# Patient Record
Sex: MALE | Race: WHITE | NOT HISPANIC OR LATINO | Employment: OTHER | URBAN - NONMETROPOLITAN AREA
[De-identification: names, ages, dates, MRNs, and addresses within clinical notes are randomized per-mention and may not be internally consistent; named-entity substitution may affect disease eponyms.]

---

## 2018-08-25 ENCOUNTER — APPOINTMENT (OUTPATIENT)
Dept: CT IMAGING | Facility: HOSPITAL | Age: 68
End: 2018-08-25

## 2018-08-25 ENCOUNTER — HOSPITAL ENCOUNTER (INPATIENT)
Facility: HOSPITAL | Age: 68
LOS: 10 days | Discharge: NURSING, REHAB OR LONG-TERM CARE | End: 2018-09-05
Attending: EMERGENCY MEDICINE | Admitting: INTERNAL MEDICINE

## 2018-08-25 ENCOUNTER — APPOINTMENT (OUTPATIENT)
Dept: GENERAL RADIOLOGY | Facility: HOSPITAL | Age: 68
End: 2018-08-25

## 2018-08-25 DIAGNOSIS — F10.10 ALCOHOL ABUSE: ICD-10-CM

## 2018-08-25 DIAGNOSIS — T07.XXXA ABRASIONS OF MULTIPLE SITES: ICD-10-CM

## 2018-08-25 DIAGNOSIS — R13.12 OROPHARYNGEAL DYSPHAGIA: ICD-10-CM

## 2018-08-25 DIAGNOSIS — E86.0 DEHYDRATION: ICD-10-CM

## 2018-08-25 DIAGNOSIS — M62.82 NON-TRAUMATIC RHABDOMYOLYSIS: ICD-10-CM

## 2018-08-25 DIAGNOSIS — S06.5XAA SUBDURAL HEMATOMA (HCC): Primary | ICD-10-CM

## 2018-08-25 DIAGNOSIS — N17.9 AKI (ACUTE KIDNEY INJURY) (HCC): ICD-10-CM
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
## 2018-08-25 DIAGNOSIS — Z74.09 IMPAIRED MOBILITY AND ADLS: ICD-10-CM

## 2018-08-25 DIAGNOSIS — R53.1 GENERALIZED WEAKNESS: ICD-10-CM

## 2018-08-25 DIAGNOSIS — Z78.9 IMPAIRED MOBILITY AND ADLS: ICD-10-CM

## 2018-08-25 DIAGNOSIS — D64.9 ANEMIA, UNSPECIFIED TYPE: ICD-10-CM

## 2018-08-25 DIAGNOSIS — R77.8 ELEVATED TROPONIN: ICD-10-CM

## 2018-08-25 LAB
HOLD SPECIMEN: NORMAL
HOLD SPECIMEN: NORMAL
TROPONIN I SERPL-MCNC: 0.24 NG/ML (ref 0–0.03)
WHOLE BLOOD HOLD SPECIMEN: NORMAL
WHOLE BLOOD HOLD SPECIMEN: NORMAL

## 2018-08-25 PROCEDURE — 83550 IRON BINDING TEST: CPT | Performed by: INTERNAL MEDICINE

## 2018-08-25 PROCEDURE — 99285 EMERGENCY DEPT VISIT HI MDM: CPT

## 2018-08-25 PROCEDURE — 80048 BASIC METABOLIC PNL TOTAL CA: CPT | Performed by: NEUROLOGICAL SURGERY

## 2018-08-25 PROCEDURE — 85027 COMPLETE CBC AUTOMATED: CPT | Performed by: NEUROLOGICAL SURGERY

## 2018-08-25 PROCEDURE — 84484 ASSAY OF TROPONIN QUANT: CPT | Performed by: EMERGENCY MEDICINE

## 2018-08-25 PROCEDURE — 83540 ASSAY OF IRON: CPT | Performed by: INTERNAL MEDICINE

## 2018-08-25 PROCEDURE — 70450 CT HEAD/BRAIN W/O DYE: CPT

## 2018-08-25 PROCEDURE — 93010 ELECTROCARDIOGRAM REPORT: CPT | Performed by: INTERNAL MEDICINE

## 2018-08-25 PROCEDURE — 80307 DRUG TEST PRSMV CHEM ANLYZR: CPT | Performed by: EMERGENCY MEDICINE

## 2018-08-25 PROCEDURE — 90471 IMMUNIZATION ADMIN: CPT | Performed by: EMERGENCY MEDICINE

## 2018-08-25 PROCEDURE — 82728 ASSAY OF FERRITIN: CPT | Performed by: INTERNAL MEDICINE

## 2018-08-25 PROCEDURE — 25010000002 TDAP 5-2.5-18.5 LF-MCG/0.5 SUSPENSION: Performed by: EMERGENCY MEDICINE

## 2018-08-25 PROCEDURE — 82746 ASSAY OF FOLIC ACID SERUM: CPT | Performed by: INTERNAL MEDICINE

## 2018-08-25 PROCEDURE — 93005 ELECTROCARDIOGRAM TRACING: CPT | Performed by: EMERGENCY MEDICINE

## 2018-08-25 PROCEDURE — 85610 PROTHROMBIN TIME: CPT | Performed by: NEUROLOGICAL SURGERY

## 2018-08-25 PROCEDURE — 90715 TDAP VACCINE 7 YRS/> IM: CPT | Performed by: EMERGENCY MEDICINE

## 2018-08-25 PROCEDURE — 72170 X-RAY EXAM OF PELVIS: CPT

## 2018-08-25 PROCEDURE — 82607 VITAMIN B-12: CPT | Performed by: INTERNAL MEDICINE

## 2018-08-25 PROCEDURE — 93005 ELECTROCARDIOGRAM TRACING: CPT

## 2018-08-25 RX ORDER — FUROSEMIDE 40 MG/1
40 TABLET ORAL DAILY
COMMUNITY
End: 2018-09-05 | Stop reason: HOSPADM

## 2018-08-25 RX ADMIN — TETANUS TOXOID, REDUCED DIPHTHERIA TOXOID AND ACELLULAR PERTUSSIS VACCINE, ADSORBED 0.5 ML: 5; 2.5; 8; 8; 2.5 SUSPENSION INTRAMUSCULAR at 23:59

## 2018-08-26 ENCOUNTER — APPOINTMENT (OUTPATIENT)
Dept: CT IMAGING | Facility: HOSPITAL | Age: 68
End: 2018-08-26

## 2018-08-26 ENCOUNTER — APPOINTMENT (OUTPATIENT)
Dept: CARDIOLOGY | Facility: HOSPITAL | Age: 68
End: 2018-08-26
Attending: INTERNAL MEDICINE

## 2018-08-26 ENCOUNTER — APPOINTMENT (OUTPATIENT)
Dept: GENERAL RADIOLOGY | Facility: HOSPITAL | Age: 68
End: 2018-08-26

## 2018-08-26 PROBLEM — N17.9 AKI (ACUTE KIDNEY INJURY) (HCC): Status: ACTIVE | Noted: 2018-08-26

## 2018-08-26 PROBLEM — E86.0 DEHYDRATION: Status: ACTIVE | Noted: 2018-08-26

## 2018-08-26 PROBLEM — D64.9 ANEMIA: Status: ACTIVE | Noted: 2018-08-26

## 2018-08-26 PROBLEM — K74.60 CIRRHOSIS OF LIVER (HCC): Status: ACTIVE | Noted: 2018-08-26

## 2018-08-26 PROBLEM — K42.9 UMBILICAL HERNIA: Status: ACTIVE | Noted: 2018-08-26

## 2018-08-26 PROBLEM — F10.10 ALCOHOL ABUSE: Status: ACTIVE | Noted: 2018-08-26

## 2018-08-26 PROBLEM — T07.XXXA ABRASIONS OF MULTIPLE SITES: Status: ACTIVE | Noted: 2018-08-26

## 2018-08-26 PROBLEM — R77.8 ELEVATED TROPONIN: Status: ACTIVE | Noted: 2018-08-26

## 2018-08-26 PROBLEM — S06.5XAA SUBDURAL HEMATOMA (HCC): Status: ACTIVE | Noted: 2018-08-26

## 2018-08-26 PROBLEM — M62.82 NON-TRAUMATIC RHABDOMYOLYSIS: Status: ACTIVE | Noted: 2018-08-26

## 2018-08-26 LAB
ABO GROUP BLD: NORMAL
ALBUMIN SERPL-MCNC: 2.2 G/DL (ref 3.5–5)
ALBUMIN/GLOB SERPL: 0.7 G/DL (ref 1.1–2.5)
ALP SERPL-CCNC: 82 U/L (ref 24–120)
ALT SERPL W P-5'-P-CCNC: 46 U/L (ref 0–54)
ANION GAP SERPL CALCULATED.3IONS-SCNC: 7 MMOL/L (ref 4–13)
ANION GAP SERPL CALCULATED.3IONS-SCNC: 9 MMOL/L (ref 4–13)
AST SERPL-CCNC: 106 U/L (ref 7–45)
BASOPHILS # BLD AUTO: 0.03 10*3/MM3 (ref 0–0.2)
BASOPHILS NFR BLD AUTO: 0.3 % (ref 0–2)
BH CV ECHO MEAS - AO MAX PG (FULL): 0.82 MMHG
BH CV ECHO MEAS - AO MAX PG: 7 MMHG
BH CV ECHO MEAS - AO MEAN PG (FULL): 1 MMHG
BH CV ECHO MEAS - AO MEAN PG: 4 MMHG
BH CV ECHO MEAS - AO ROOT AREA (BSA CORRECTED): 1.6
BH CV ECHO MEAS - AO ROOT AREA: 8 CM^2
BH CV ECHO MEAS - AO ROOT DIAM: 3.2 CM
BH CV ECHO MEAS - AO V2 MAX: 132 CM/SEC
BH CV ECHO MEAS - AO V2 MEAN: 86.9 CM/SEC
BH CV ECHO MEAS - AO V2 VTI: 23.8 CM
BH CV ECHO MEAS - AVA(I,A): 4 CM^2
BH CV ECHO MEAS - AVA(I,D): 4 CM^2
BH CV ECHO MEAS - AVA(V,A): 3.6 CM^2
BH CV ECHO MEAS - AVA(V,D): 3.6 CM^2
BH CV ECHO MEAS - BSA(HAYCOCK): 2 M^2
BH CV ECHO MEAS - BSA: 2.1 M^2
BH CV ECHO MEAS - BZI_BMI: 20.6 KILOGRAMS/M^2
BH CV ECHO MEAS - BZI_METRIC_HEIGHT: 193 CM
BH CV ECHO MEAS - BZI_METRIC_WEIGHT: 76.7 KG
BH CV ECHO MEAS - EDV(CUBED): 190.1 ML
BH CV ECHO MEAS - EDV(MOD-SP4): 113 ML
BH CV ECHO MEAS - EDV(TEICH): 163.3 ML
BH CV ECHO MEAS - EF(CUBED): 76.7 %
BH CV ECHO MEAS - EF(MOD-SP4): 51.7 %
BH CV ECHO MEAS - EF(TEICH): 68 %
BH CV ECHO MEAS - ESV(CUBED): 44.4 ML
BH CV ECHO MEAS - ESV(MOD-SP4): 54.6 ML
BH CV ECHO MEAS - ESV(TEICH): 52.3 ML
BH CV ECHO MEAS - FS: 38.4 %
BH CV ECHO MEAS - IVS/LVPW: 1.1
BH CV ECHO MEAS - IVSD: 1.1 CM
BH CV ECHO MEAS - LA DIMENSION: 4.4 CM
BH CV ECHO MEAS - LA/AO: 1.4
BH CV ECHO MEAS - LAT PEAK E' VEL: 8.5 CM/SEC
BH CV ECHO MEAS - LV DIASTOLIC VOL/BSA (35-75): 54.8 ML/M^2
BH CV ECHO MEAS - LV MASS(C)D: 248 GRAMS
BH CV ECHO MEAS - LV MASS(C)DI: 120.2 GRAMS/M^2
BH CV ECHO MEAS - LV MAX PG: 6.2 MMHG
BH CV ECHO MEAS - LV MEAN PG: 3 MMHG
BH CV ECHO MEAS - LV SYSTOLIC VOL/BSA (12-30): 26.5 ML/M^2
BH CV ECHO MEAS - LV V1 MAX: 124 CM/SEC
BH CV ECHO MEAS - LV V1 MEAN: 81.7 CM/SEC
BH CV ECHO MEAS - LV V1 VTI: 24.8 CM
BH CV ECHO MEAS - LVIDD: 5.8 CM
BH CV ECHO MEAS - LVIDS: 3.5 CM
BH CV ECHO MEAS - LVLD AP4: 9.5 CM
BH CV ECHO MEAS - LVLS AP4: 7.4 CM
BH CV ECHO MEAS - LVOT AREA (M): 3.8 CM^2
BH CV ECHO MEAS - LVOT AREA: 3.8 CM^2
BH CV ECHO MEAS - LVOT DIAM: 2.2 CM
BH CV ECHO MEAS - LVPWD: 1 CM
BH CV ECHO MEAS - MED PEAK E' VEL: 6.31 CM/SEC
BH CV ECHO MEAS - MV A MAX VEL: 77.6 CM/SEC
BH CV ECHO MEAS - MV DEC TIME: 0.25 SEC
BH CV ECHO MEAS - MV E MAX VEL: 84.9 CM/SEC
BH CV ECHO MEAS - MV E/A: 1.1
BH CV ECHO MEAS - PA MAX PG: 1.9 MMHG
BH CV ECHO MEAS - PA V2 MAX: 69.4 CM/SEC
BH CV ECHO MEAS - RAP SYSTOLE: 5 MMHG
BH CV ECHO MEAS - RVSP: 34.4 MMHG
BH CV ECHO MEAS - SI(AO): 92.8 ML/M^2
BH CV ECHO MEAS - SI(CUBED): 70.7 ML/M^2
BH CV ECHO MEAS - SI(LVOT): 45.7 ML/M^2
BH CV ECHO MEAS - SI(MOD-SP4): 28.3 ML/M^2
BH CV ECHO MEAS - SI(TEICH): 53.8 ML/M^2
BH CV ECHO MEAS - SV(AO): 191.4 ML
BH CV ECHO MEAS - SV(CUBED): 145.7 ML
BH CV ECHO MEAS - SV(LVOT): 94.3 ML
BH CV ECHO MEAS - SV(MOD-SP4): 58.4 ML
BH CV ECHO MEAS - SV(TEICH): 111 ML
BH CV ECHO MEAS - TR MAX VEL: 271 CM/SEC
BH CV ECHO MEASUREMENTS AVERAGE E/E' RATIO: 11.47
BILIRUB SERPL-MCNC: 2.2 MG/DL (ref 0.1–1)
BLD GP AB SCN SERPL QL: NEGATIVE
BUN BLD-MCNC: 33 MG/DL (ref 5–21)
BUN BLD-MCNC: 34 MG/DL (ref 5–21)
BUN/CREAT SERPL: 13.7 (ref 7–25)
BUN/CREAT SERPL: 15 (ref 7–25)
CALCIUM SPEC-SCNC: 7.9 MG/DL (ref 8.4–10.4)
CALCIUM SPEC-SCNC: 7.9 MG/DL (ref 8.4–10.4)
CHLORIDE SERPL-SCNC: 107 MMOL/L (ref 98–110)
CHLORIDE SERPL-SCNC: 107 MMOL/L (ref 98–110)
CO2 SERPL-SCNC: 23 MMOL/L (ref 24–31)
CO2 SERPL-SCNC: 23 MMOL/L (ref 24–31)
CREAT BLD-MCNC: 2.26 MG/DL (ref 0.5–1.4)
CREAT BLD-MCNC: 2.41 MG/DL (ref 0.5–1.4)
CRP SERPL-MCNC: 5.41 MG/DL (ref 0–0.99)
D-LACTATE SERPL-SCNC: 1.6 MMOL/L (ref 0.5–2)
D-LACTATE SERPL-SCNC: 1.8 MMOL/L (ref 0.5–2)
DEPRECATED RDW RBC AUTO: 47.2 FL (ref 40–54)
DEPRECATED RDW RBC AUTO: 48.6 FL (ref 40–54)
DEVELOPER EXPIRATION DATE: NORMAL
DEVELOPER LOT NUMBER: 129
EOSINOPHIL # BLD AUTO: 0.2 10*3/MM3 (ref 0–0.7)
EOSINOPHIL NFR BLD AUTO: 2 % (ref 0–4)
ERYTHROCYTE [DISTWIDTH] IN BLOOD BY AUTOMATED COUNT: 16.1 % (ref 12–15)
ERYTHROCYTE [DISTWIDTH] IN BLOOD BY AUTOMATED COUNT: 16.3 % (ref 12–15)
ERYTHROCYTE [SEDIMENTATION RATE] IN BLOOD: 12 MM/HR (ref 0–15)
ETHANOL UR QL: <0.01 %
EXPIRATION DATE: NORMAL
FECAL OCCULT BLOOD SCREEN, POC: NEGATIVE
FERRITIN SERPL-MCNC: 161 NG/ML (ref 17.9–464)
FOLATE SERPL-MCNC: 9.92 NG/ML
GFR SERPL CREATININE-BSD FRML MDRD: 27 ML/MIN/1.73
GFR SERPL CREATININE-BSD FRML MDRD: 29 ML/MIN/1.73
GLOBULIN UR ELPH-MCNC: 3.3 GM/DL
GLUCOSE BLD-MCNC: 90 MG/DL (ref 70–100)
GLUCOSE BLD-MCNC: 98 MG/DL (ref 70–100)
HBA1C MFR BLD: 4.7 %
HCT VFR BLD AUTO: 23.7 % (ref 40–52)
HCT VFR BLD AUTO: 23.9 % (ref 40–52)
HGB BLD-MCNC: 7.9 G/DL (ref 14–18)
HGB BLD-MCNC: 7.9 G/DL (ref 14–18)
IMM GRANULOCYTES # BLD: 0.06 10*3/MM3 (ref 0–0.03)
IMM GRANULOCYTES NFR BLD: 0.6 % (ref 0–5)
INR PPP: 1.64 (ref 0.91–1.09)
IRON 24H UR-MRATE: 23 MCG/DL (ref 42–180)
IRON SATN MFR SERPL: 11 % (ref 20–45)
LEFT ATRIUM VOLUME INDEX: 36.5 ML/M2
LEFT ATRIUM VOLUME: 75.2 CM3
LIPASE SERPL-CCNC: 166 U/L (ref 23–203)
LYMPHOCYTES # BLD AUTO: 1.03 10*3/MM3 (ref 0.72–4.86)
LYMPHOCYTES NFR BLD AUTO: 10.3 % (ref 15–45)
Lab: 129
MAGNESIUM SERPL-MCNC: 1.9 MG/DL (ref 1.4–2.2)
MAGNESIUM SERPL-MCNC: 2 MG/DL (ref 1.4–2.2)
MAXIMAL PREDICTED HEART RATE: 153 BPM
MCH RBC QN AUTO: 27.7 PG (ref 28–32)
MCH RBC QN AUTO: 27.8 PG (ref 28–32)
MCHC RBC AUTO-ENTMCNC: 33.1 G/DL (ref 33–36)
MCHC RBC AUTO-ENTMCNC: 33.3 G/DL (ref 33–36)
MCV RBC AUTO: 83.2 FL (ref 82–95)
MCV RBC AUTO: 84.2 FL (ref 82–95)
MONOCYTES # BLD AUTO: 0.98 10*3/MM3 (ref 0.19–1.3)
MONOCYTES NFR BLD AUTO: 9.8 % (ref 4–12)
NEGATIVE CONTROL: NEGATIVE
NEUTROPHILS # BLD AUTO: 7.66 10*3/MM3 (ref 1.87–8.4)
NEUTROPHILS NFR BLD AUTO: 77 % (ref 39–78)
NRBC BLD MANUAL-RTO: 0 /100 WBC (ref 0–0)
NT-PROBNP SERPL-MCNC: ABNORMAL PG/ML (ref 0–900)
PHOSPHATE SERPL-MCNC: 4.7 MG/DL (ref 2.5–4.5)
PLATELET # BLD AUTO: 230 10*3/MM3 (ref 130–400)
PLATELET # BLD AUTO: 249 10*3/MM3 (ref 130–400)
PMV BLD AUTO: 9.1 FL (ref 6–12)
PMV BLD AUTO: 9.4 FL (ref 6–12)
POSITIVE CONTROL: POSITIVE
POTASSIUM BLD-SCNC: 4.8 MMOL/L (ref 3.5–5.3)
POTASSIUM BLD-SCNC: 4.9 MMOL/L (ref 3.5–5.3)
PROCALCITONIN SERPL-MCNC: <0.25 NG/ML
PROT SERPL-MCNC: 5.5 G/DL (ref 6.3–8.7)
PROTHROMBIN TIME: 20 SECONDS (ref 11.9–14.6)
RBC # BLD AUTO: 2.84 10*6/MM3 (ref 4.8–5.9)
RBC # BLD AUTO: 2.85 10*6/MM3 (ref 4.8–5.9)
RETICS #: 0.09 10*6/MM3 (ref 0.02–0.13)
RETICS/RBC NFR AUTO: 3.19 % (ref 0.6–1.8)
RH BLD: POSITIVE
SODIUM BLD-SCNC: 137 MMOL/L (ref 135–145)
SODIUM BLD-SCNC: 139 MMOL/L (ref 135–145)
STRESS TARGET HR: 130 BPM
T&S EXPIRATION DATE: NORMAL
TIBC SERPL-MCNC: 207 MCG/DL (ref 225–420)
TROPONIN I SERPL-MCNC: 0.24 NG/ML (ref 0–0.03)
TSH SERPL DL<=0.05 MIU/L-ACNC: 8.7 MIU/ML (ref 0.47–4.68)
VIT B12 BLD-MCNC: 798 PG/ML (ref 239–931)
WBC NRBC COR # BLD: 10.91 10*3/MM3 (ref 4.8–10.8)
WBC NRBC COR # BLD: 9.96 10*3/MM3 (ref 4.8–10.8)

## 2018-08-26 PROCEDURE — 86140 C-REACTIVE PROTEIN: CPT | Performed by: INTERNAL MEDICINE

## 2018-08-26 PROCEDURE — 30233N1 TRANSFUSION OF NONAUTOLOGOUS RED BLOOD CELLS INTO PERIPHERAL VEIN, PERCUTANEOUS APPROACH: ICD-10-PCS | Performed by: NEUROLOGICAL SURGERY

## 2018-08-26 PROCEDURE — 25010000002 POTASSIUM CHLORIDE PER 2 MEQ OF POTASSIUM: Performed by: INTERNAL MEDICINE

## 2018-08-26 PROCEDURE — G0378 HOSPITAL OBSERVATION PER HR: HCPCS

## 2018-08-26 PROCEDURE — 99223 1ST HOSP IP/OBS HIGH 75: CPT | Performed by: NEUROLOGICAL SURGERY

## 2018-08-26 PROCEDURE — 84443 ASSAY THYROID STIM HORMONE: CPT | Performed by: INTERNAL MEDICINE

## 2018-08-26 PROCEDURE — 86900 BLOOD TYPING SEROLOGIC ABO: CPT | Performed by: EMERGENCY MEDICINE

## 2018-08-26 PROCEDURE — 25010000002 PERFLUTREN 6.52 MG/ML SUSPENSION: Performed by: FAMILY MEDICINE

## 2018-08-26 PROCEDURE — 83690 ASSAY OF LIPASE: CPT | Performed by: INTERNAL MEDICINE

## 2018-08-26 PROCEDURE — 70450 CT HEAD/BRAIN W/O DYE: CPT

## 2018-08-26 PROCEDURE — 83735 ASSAY OF MAGNESIUM: CPT | Performed by: INTERNAL MEDICINE

## 2018-08-26 PROCEDURE — 93005 ELECTROCARDIOGRAM TRACING: CPT | Performed by: INTERNAL MEDICINE

## 2018-08-26 PROCEDURE — 84145 PROCALCITONIN (PCT): CPT | Performed by: INTERNAL MEDICINE

## 2018-08-26 PROCEDURE — 94640 AIRWAY INHALATION TREATMENT: CPT

## 2018-08-26 PROCEDURE — 86901 BLOOD TYPING SEROLOGIC RH(D): CPT | Performed by: EMERGENCY MEDICINE

## 2018-08-26 PROCEDURE — 93010 ELECTROCARDIOGRAM REPORT: CPT | Performed by: INTERNAL MEDICINE

## 2018-08-26 PROCEDURE — 83605 ASSAY OF LACTIC ACID: CPT | Performed by: INTERNAL MEDICINE

## 2018-08-26 PROCEDURE — 85651 RBC SED RATE NONAUTOMATED: CPT | Performed by: INTERNAL MEDICINE

## 2018-08-26 PROCEDURE — 85025 COMPLETE CBC W/AUTO DIFF WBC: CPT | Performed by: INTERNAL MEDICINE

## 2018-08-26 PROCEDURE — 85045 AUTOMATED RETICULOCYTE COUNT: CPT | Performed by: INTERNAL MEDICINE

## 2018-08-26 PROCEDURE — 86901 BLOOD TYPING SEROLOGIC RH(D): CPT

## 2018-08-26 PROCEDURE — 80053 COMPREHEN METABOLIC PANEL: CPT | Performed by: INTERNAL MEDICINE

## 2018-08-26 PROCEDURE — 83036 HEMOGLOBIN GLYCOSYLATED A1C: CPT | Performed by: INTERNAL MEDICINE

## 2018-08-26 PROCEDURE — 84100 ASSAY OF PHOSPHORUS: CPT | Performed by: INTERNAL MEDICINE

## 2018-08-26 PROCEDURE — 94799 UNLISTED PULMONARY SVC/PX: CPT

## 2018-08-26 PROCEDURE — 93306 TTE W/DOPPLER COMPLETE: CPT

## 2018-08-26 PROCEDURE — 36430 TRANSFUSION BLD/BLD COMPNT: CPT

## 2018-08-26 PROCEDURE — 86927 PLASMA FRESH FROZEN: CPT

## 2018-08-26 PROCEDURE — 25010000002 THIAMINE PER 100 MG: Performed by: INTERNAL MEDICINE

## 2018-08-26 PROCEDURE — 83880 ASSAY OF NATRIURETIC PEPTIDE: CPT | Performed by: INTERNAL MEDICINE

## 2018-08-26 PROCEDURE — P9046 ALBUMIN (HUMAN), 25%, 20 ML: HCPCS | Performed by: INTERNAL MEDICINE

## 2018-08-26 PROCEDURE — 25010000002 ALBUMIN HUMAN 25% PER 50 ML: Performed by: INTERNAL MEDICINE

## 2018-08-26 PROCEDURE — 84484 ASSAY OF TROPONIN QUANT: CPT | Performed by: INTERNAL MEDICINE

## 2018-08-26 PROCEDURE — 93306 TTE W/DOPPLER COMPLETE: CPT | Performed by: INTERNAL MEDICINE

## 2018-08-26 PROCEDURE — P9017 PLASMA 1 DONOR FRZ W/IN 8 HR: HCPCS

## 2018-08-26 PROCEDURE — 86850 RBC ANTIBODY SCREEN: CPT | Performed by: EMERGENCY MEDICINE

## 2018-08-26 PROCEDURE — 86920 COMPATIBILITY TEST SPIN: CPT

## 2018-08-26 PROCEDURE — 82270 OCCULT BLOOD FECES: CPT | Performed by: EMERGENCY MEDICINE

## 2018-08-26 PROCEDURE — 86900 BLOOD TYPING SEROLOGIC ABO: CPT

## 2018-08-26 PROCEDURE — 73030 X-RAY EXAM OF SHOULDER: CPT

## 2018-08-26 RX ORDER — PANTOPRAZOLE SODIUM 40 MG/10ML
80 INJECTION, POWDER, LYOPHILIZED, FOR SOLUTION INTRAVENOUS ONCE
Status: COMPLETED | OUTPATIENT
Start: 2018-08-26 | End: 2018-08-26

## 2018-08-26 RX ORDER — ALBUMIN (HUMAN) 12.5 G/50ML
50 SOLUTION INTRAVENOUS ONCE
Status: COMPLETED | OUTPATIENT
Start: 2018-08-26 | End: 2018-08-26

## 2018-08-26 RX ORDER — SODIUM CHLORIDE 0.9 % (FLUSH) 0.9 %
1-10 SYRINGE (ML) INJECTION AS NEEDED
Status: DISCONTINUED | OUTPATIENT
Start: 2018-08-26 | End: 2018-09-05 | Stop reason: HOSPADM

## 2018-08-26 RX ORDER — SODIUM CHLORIDE 9 MG/ML
50 INJECTION, SOLUTION INTRAVENOUS CONTINUOUS
Status: DISCONTINUED | OUTPATIENT
Start: 2018-08-26 | End: 2018-08-30

## 2018-08-26 RX ORDER — ACETAMINOPHEN 650 MG/1
650 SUPPOSITORY RECTAL EVERY 4 HOURS PRN
Status: DISCONTINUED | OUTPATIENT
Start: 2018-08-26 | End: 2018-09-05 | Stop reason: HOSPADM

## 2018-08-26 RX ORDER — IPRATROPIUM BROMIDE AND ALBUTEROL SULFATE 2.5; .5 MG/3ML; MG/3ML
3 SOLUTION RESPIRATORY (INHALATION) ONCE
Status: COMPLETED | OUTPATIENT
Start: 2018-08-26 | End: 2018-08-26

## 2018-08-26 RX ORDER — ONDANSETRON 4 MG/1
4 TABLET, FILM COATED ORAL EVERY 6 HOURS PRN
Status: DISCONTINUED | OUTPATIENT
Start: 2018-08-26 | End: 2018-09-05 | Stop reason: HOSPADM

## 2018-08-26 RX ORDER — SODIUM CHLORIDE 9 MG/ML
100 INJECTION, SOLUTION INTRAVENOUS CONTINUOUS
Status: DISCONTINUED | OUTPATIENT
Start: 2018-08-26 | End: 2018-08-26

## 2018-08-26 RX ORDER — FUROSEMIDE 40 MG/1
40 TABLET ORAL 2 TIMES DAILY
Status: DISCONTINUED | OUTPATIENT
Start: 2018-08-26 | End: 2018-08-26

## 2018-08-26 RX ORDER — ACETAMINOPHEN 325 MG/1
650 TABLET ORAL EVERY 4 HOURS PRN
Status: DISCONTINUED | OUTPATIENT
Start: 2018-08-26 | End: 2018-09-05 | Stop reason: HOSPADM

## 2018-08-26 RX ORDER — IPRATROPIUM BROMIDE AND ALBUTEROL SULFATE 2.5; .5 MG/3ML; MG/3ML
3 SOLUTION RESPIRATORY (INHALATION) EVERY 6 HOURS PRN
Status: DISCONTINUED | OUTPATIENT
Start: 2018-08-26 | End: 2018-08-29

## 2018-08-26 RX ADMIN — IPRATROPIUM BROMIDE AND ALBUTEROL SULFATE 3 ML: 2.5; .5 SOLUTION RESPIRATORY (INHALATION) at 00:42

## 2018-08-26 RX ADMIN — SODIUM CHLORIDE 500 ML: 9 INJECTION, SOLUTION INTRAVENOUS at 02:49

## 2018-08-26 RX ADMIN — SODIUM CHLORIDE 125 ML/HR: 9 INJECTION, SOLUTION INTRAVENOUS at 05:27

## 2018-08-26 RX ADMIN — ALBUMIN HUMAN 50 G: 0.25 SOLUTION INTRAVENOUS at 06:44

## 2018-08-26 RX ADMIN — PERFLUTREN 9.78 MG: 6.52 INJECTION, SUSPENSION INTRAVENOUS at 14:54

## 2018-08-26 RX ADMIN — PANTOPRAZOLE SODIUM 80 MG: 40 INJECTION, POWDER, FOR SOLUTION INTRAVENOUS at 01:51

## 2018-08-26 RX ADMIN — THIAMINE HYDROCHLORIDE 125 ML/HR: 100 INJECTION, SOLUTION INTRAMUSCULAR; INTRAVENOUS at 05:26

## 2018-08-26 RX ADMIN — ACETAMINOPHEN 650 MG: 325 TABLET, FILM COATED ORAL at 17:01

## 2018-08-26 RX ADMIN — SODIUM CHLORIDE 1000 ML: 9 INJECTION, SOLUTION INTRAVENOUS at 01:08

## 2018-08-27 ENCOUNTER — APPOINTMENT (OUTPATIENT)
Dept: GENERAL RADIOLOGY | Facility: HOSPITAL | Age: 68
End: 2018-08-27

## 2018-08-27 ENCOUNTER — APPOINTMENT (OUTPATIENT)
Dept: ULTRASOUND IMAGING | Facility: HOSPITAL | Age: 68
End: 2018-08-27

## 2018-08-27 ENCOUNTER — APPOINTMENT (OUTPATIENT)
Dept: CT IMAGING | Facility: HOSPITAL | Age: 68
End: 2018-08-27

## 2018-08-27 LAB
ABO + RH BLD: NORMAL
ABO + RH BLD: NORMAL
ALBUMIN SERPL-MCNC: 2.6 G/DL (ref 3.5–5)
ALBUMIN/GLOB SERPL: 0.9 G/DL (ref 1.1–2.5)
ALP SERPL-CCNC: 89 U/L (ref 24–120)
ALT SERPL W P-5'-P-CCNC: 45 U/L (ref 0–54)
AMPHET+METHAMPHET UR QL: NEGATIVE
ANION GAP SERPL CALCULATED.3IONS-SCNC: 7 MMOL/L (ref 4–13)
AST SERPL-CCNC: 78 U/L (ref 7–45)
BACTERIA UR QL AUTO: ABNORMAL /HPF
BARBITURATES UR QL SCN: NEGATIVE
BASOPHILS # BLD AUTO: 0.05 10*3/MM3 (ref 0–0.2)
BASOPHILS NFR BLD AUTO: 0.8 % (ref 0–2)
BENZODIAZ UR QL SCN: NEGATIVE
BH BB BLOOD EXPIRATION DATE: NORMAL
BH BB BLOOD EXPIRATION DATE: NORMAL
BH BB BLOOD TYPE BARCODE: 5100
BH BB BLOOD TYPE BARCODE: 5100
BH BB DISPENSE STATUS: NORMAL
BH BB DISPENSE STATUS: NORMAL
BH BB PRODUCT CODE: NORMAL
BH BB PRODUCT CODE: NORMAL
BH BB UNIT NUMBER: NORMAL
BH BB UNIT NUMBER: NORMAL
BILIRUB SERPL-MCNC: 1.2 MG/DL (ref 0.1–1)
BILIRUB UR QL STRIP: ABNORMAL
BUN BLD-MCNC: 34 MG/DL (ref 5–21)
BUN/CREAT SERPL: 12.3 (ref 7–25)
CALCIUM SPEC-SCNC: 8.1 MG/DL (ref 8.4–10.4)
CANNABINOIDS SERPL QL: NEGATIVE
CHLORIDE SERPL-SCNC: 107 MMOL/L (ref 98–110)
CK SERPL-CCNC: 397 U/L (ref 0–203)
CLARITY UR: ABNORMAL
CO2 SERPL-SCNC: 24 MMOL/L (ref 24–31)
COCAINE UR QL: NEGATIVE
COLOR UR: ABNORMAL
CREAT BLD-MCNC: 2.76 MG/DL (ref 0.5–1.4)
CREAT UR-MCNC: 229.6 MG/DL
DEPRECATED RDW RBC AUTO: 49.1 FL (ref 40–54)
EOSINOPHIL # BLD AUTO: 0.47 10*3/MM3 (ref 0–0.7)
EOSINOPHIL NFR BLD AUTO: 7.8 % (ref 0–4)
ERYTHROCYTE [DISTWIDTH] IN BLOOD BY AUTOMATED COUNT: 16.6 % (ref 12–15)
FERRITIN SERPL-MCNC: 112 NG/ML (ref 17.9–464)
FOLATE SERPL-MCNC: 11.7 NG/ML
GFR SERPL CREATININE-BSD FRML MDRD: 23 ML/MIN/1.73
GLOBULIN UR ELPH-MCNC: 3 GM/DL
GLUCOSE BLD-MCNC: 125 MG/DL (ref 70–100)
GLUCOSE UR STRIP-MCNC: NEGATIVE MG/DL
HCT VFR BLD AUTO: 20.9 % (ref 40–52)
HCT VFR BLD AUTO: 26.7 % (ref 40–52)
HGB BLD-MCNC: 6.9 G/DL (ref 14–18)
HGB BLD-MCNC: 8.8 G/DL (ref 14–18)
HGB UR QL STRIP.AUTO: ABNORMAL
HYALINE CASTS UR QL AUTO: ABNORMAL /LPF
IMM GRANULOCYTES # BLD: 0.04 10*3/MM3 (ref 0–0.03)
IMM GRANULOCYTES NFR BLD: 0.7 % (ref 0–5)
INR PPP: 1.41 (ref 0.91–1.09)
IRON 24H UR-MRATE: 14 MCG/DL (ref 42–180)
IRON SATN MFR SERPL: 6 % (ref 20–45)
KETONES UR QL STRIP: ABNORMAL
LEUKOCYTE ESTERASE UR QL STRIP.AUTO: ABNORMAL
LYMPHOCYTES # BLD AUTO: 0.92 10*3/MM3 (ref 0.72–4.86)
LYMPHOCYTES NFR BLD AUTO: 15.3 % (ref 15–45)
MCH RBC QN AUTO: 27.9 PG (ref 28–32)
MCHC RBC AUTO-ENTMCNC: 33 G/DL (ref 33–36)
MCV RBC AUTO: 84.6 FL (ref 82–95)
METHADONE UR QL SCN: NEGATIVE
MONOCYTES # BLD AUTO: 0.66 10*3/MM3 (ref 0.19–1.3)
MONOCYTES NFR BLD AUTO: 10.9 % (ref 4–12)
NEUTROPHILS # BLD AUTO: 3.89 10*3/MM3 (ref 1.87–8.4)
NEUTROPHILS NFR BLD AUTO: 64.5 % (ref 39–78)
NITRITE UR QL STRIP: NEGATIVE
NRBC BLD MANUAL-RTO: 0 /100 WBC (ref 0–0)
OPIATES UR QL: NEGATIVE
PCP UR QL SCN: NEGATIVE
PH UR STRIP.AUTO: <=5 [PH] (ref 5–8)
PLATELET # BLD AUTO: 185 10*3/MM3 (ref 130–400)
PMV BLD AUTO: 8.8 FL (ref 6–12)
POTASSIUM BLD-SCNC: 4.4 MMOL/L (ref 3.5–5.3)
PROT SERPL-MCNC: 5.6 G/DL (ref 6.3–8.7)
PROT UR QL STRIP: ABNORMAL
PROT UR-MCNC: 28 MG/DL (ref 0–13.5)
PROTHROMBIN TIME: 17.7 SECONDS (ref 11.9–14.6)
RBC # BLD AUTO: 2.47 10*6/MM3 (ref 4.8–5.9)
RBC # UR: ABNORMAL /HPF
REF LAB TEST METHOD: ABNORMAL
SODIUM BLD-SCNC: 138 MMOL/L (ref 135–145)
SODIUM UR-SCNC: 23 MMOL/L (ref 30–90)
SP GR UR STRIP: 1.02 (ref 1–1.03)
SQUAMOUS #/AREA URNS HPF: ABNORMAL /HPF
TIBC SERPL-MCNC: 236 MCG/DL (ref 225–420)
TROPONIN I SERPL-MCNC: 0.12 NG/ML (ref 0–0.03)
UNIT  ABO: NORMAL
UNIT  ABO: NORMAL
UNIT  RH: NORMAL
UNIT  RH: NORMAL
UROBILINOGEN UR QL STRIP: ABNORMAL
UUN 24H UR-MCNC: 348 MG/DL
VIT B12 BLD-MCNC: 916 PG/ML (ref 239–931)
WBC NRBC COR # BLD: 6.03 10*3/MM3 (ref 4.8–10.8)
WBC UR QL AUTO: ABNORMAL /HPF

## 2018-08-27 PROCEDURE — 82728 ASSAY OF FERRITIN: CPT | Performed by: INTERNAL MEDICINE

## 2018-08-27 PROCEDURE — 25010000002 THIAMINE PER 100 MG: Performed by: INTERNAL MEDICINE

## 2018-08-27 PROCEDURE — 83550 IRON BINDING TEST: CPT | Performed by: INTERNAL MEDICINE

## 2018-08-27 PROCEDURE — P9016 RBC LEUKOCYTES REDUCED: HCPCS

## 2018-08-27 PROCEDURE — 76775 US EXAM ABDO BACK WALL LIM: CPT

## 2018-08-27 PROCEDURE — 84540 ASSAY OF URINE/UREA-N: CPT | Performed by: NURSE PRACTITIONER

## 2018-08-27 PROCEDURE — 85025 COMPLETE CBC W/AUTO DIFF WBC: CPT | Performed by: FAMILY MEDICINE

## 2018-08-27 PROCEDURE — 80307 DRUG TEST PRSMV CHEM ANLYZR: CPT | Performed by: EMERGENCY MEDICINE

## 2018-08-27 PROCEDURE — 82550 ASSAY OF CK (CPK): CPT | Performed by: FAMILY MEDICINE

## 2018-08-27 PROCEDURE — 82570 ASSAY OF URINE CREATININE: CPT | Performed by: INTERNAL MEDICINE

## 2018-08-27 PROCEDURE — 97166 OT EVAL MOD COMPLEX 45 MIN: CPT

## 2018-08-27 PROCEDURE — 63710000001 FLINTSTONES COMPLETE 60 MG CHEWABLE TABLET: Performed by: INTERNAL MEDICINE

## 2018-08-27 PROCEDURE — 81001 URINALYSIS AUTO W/SCOPE: CPT | Performed by: INTERNAL MEDICINE

## 2018-08-27 PROCEDURE — G8978 MOBILITY CURRENT STATUS: HCPCS

## 2018-08-27 PROCEDURE — 36430 TRANSFUSION BLD/BLD COMPNT: CPT

## 2018-08-27 PROCEDURE — 85610 PROTHROMBIN TIME: CPT | Performed by: NEUROLOGICAL SURGERY

## 2018-08-27 PROCEDURE — 84484 ASSAY OF TROPONIN QUANT: CPT | Performed by: FAMILY MEDICINE

## 2018-08-27 PROCEDURE — 97162 PT EVAL MOD COMPLEX 30 MIN: CPT

## 2018-08-27 PROCEDURE — 94799 UNLISTED PULMONARY SVC/PX: CPT

## 2018-08-27 PROCEDURE — 72070 X-RAY EXAM THORAC SPINE 2VWS: CPT

## 2018-08-27 PROCEDURE — G8987 SELF CARE CURRENT STATUS: HCPCS

## 2018-08-27 PROCEDURE — 85014 HEMATOCRIT: CPT | Performed by: INTERNAL MEDICINE

## 2018-08-27 PROCEDURE — 72125 CT NECK SPINE W/O DYE: CPT

## 2018-08-27 PROCEDURE — 72100 X-RAY EXAM L-S SPINE 2/3 VWS: CPT

## 2018-08-27 PROCEDURE — 82607 VITAMIN B-12: CPT | Performed by: INTERNAL MEDICINE

## 2018-08-27 PROCEDURE — 86900 BLOOD TYPING SEROLOGIC ABO: CPT

## 2018-08-27 PROCEDURE — 83540 ASSAY OF IRON: CPT | Performed by: INTERNAL MEDICINE

## 2018-08-27 PROCEDURE — 84156 ASSAY OF PROTEIN URINE: CPT | Performed by: NURSE PRACTITIONER

## 2018-08-27 PROCEDURE — 84300 ASSAY OF URINE SODIUM: CPT | Performed by: INTERNAL MEDICINE

## 2018-08-27 PROCEDURE — G8979 MOBILITY GOAL STATUS: HCPCS

## 2018-08-27 PROCEDURE — 85018 HEMOGLOBIN: CPT | Performed by: INTERNAL MEDICINE

## 2018-08-27 PROCEDURE — 25010000002 DIPHENHYDRAMINE PER 50 MG: Performed by: INTERNAL MEDICINE

## 2018-08-27 PROCEDURE — 99231 SBSQ HOSP IP/OBS SF/LOW 25: CPT | Performed by: NEUROLOGICAL SURGERY

## 2018-08-27 PROCEDURE — 80053 COMPREHEN METABOLIC PANEL: CPT | Performed by: FAMILY MEDICINE

## 2018-08-27 PROCEDURE — G8988 SELF CARE GOAL STATUS: HCPCS

## 2018-08-27 PROCEDURE — 82746 ASSAY OF FOLIC ACID SERUM: CPT | Performed by: INTERNAL MEDICINE

## 2018-08-27 PROCEDURE — 70450 CT HEAD/BRAIN W/O DYE: CPT

## 2018-08-27 RX ORDER — SPIRONOLACTONE 100 MG/1
100 TABLET, FILM COATED ORAL 2 TIMES DAILY
COMMUNITY
End: 2018-09-05 | Stop reason: HOSPADM

## 2018-08-27 RX ORDER — DIPHENHYDRAMINE HYDROCHLORIDE 50 MG/ML
25 INJECTION INTRAMUSCULAR; INTRAVENOUS ONCE
Status: COMPLETED | OUTPATIENT
Start: 2018-08-27 | End: 2018-08-27

## 2018-08-27 RX ORDER — PROPRANOLOL HYDROCHLORIDE 10 MG/1
10 TABLET ORAL 2 TIMES DAILY
COMMUNITY

## 2018-08-27 RX ORDER — ACETAMINOPHEN 325 MG/1
650 TABLET ORAL EVERY 6 HOURS PRN
Status: DISCONTINUED | OUTPATIENT
Start: 2018-08-27 | End: 2018-09-05 | Stop reason: HOSPADM

## 2018-08-27 RX ORDER — OMEPRAZOLE 20 MG/1
20 CAPSULE, DELAYED RELEASE ORAL DAILY
COMMUNITY

## 2018-08-27 RX ORDER — DIAZEPAM 5 MG/1
5 TABLET ORAL EVERY 8 HOURS
Status: DISCONTINUED | OUTPATIENT
Start: 2018-08-27 | End: 2018-08-29

## 2018-08-27 RX ORDER — HYDROXYZINE HYDROCHLORIDE 10 MG/1
10 TABLET, FILM COATED ORAL 4 TIMES DAILY
COMMUNITY

## 2018-08-27 RX ADMIN — THIAMINE HYDROCHLORIDE 100 MG: 100 INJECTION, SOLUTION INTRAMUSCULAR; INTRAVENOUS at 16:13

## 2018-08-27 RX ADMIN — DIAZEPAM 5 MG: 5 TABLET ORAL at 11:11

## 2018-08-27 RX ADMIN — DIPHENHYDRAMINE HYDROCHLORIDE 25 MG: 50 INJECTION, SOLUTION INTRAMUSCULAR; INTRAVENOUS at 06:14

## 2018-08-27 RX ADMIN — SODIUM CHLORIDE 150 ML/HR: 9 INJECTION, SOLUTION INTRAVENOUS at 23:30

## 2018-08-27 RX ADMIN — SODIUM CHLORIDE 150 ML/HR: 9 INJECTION, SOLUTION INTRAVENOUS at 16:13

## 2018-08-27 RX ADMIN — DIAZEPAM 5 MG: 5 TABLET ORAL at 19:00

## 2018-08-27 RX ADMIN — ACETAMINOPHEN 650 MG: 325 TABLET, FILM COATED ORAL at 06:13

## 2018-08-27 RX ADMIN — SODIUM CHLORIDE 500 ML: 9 INJECTION, SOLUTION INTRAVENOUS at 11:11

## 2018-08-27 RX ADMIN — Medication 1 TABLET: at 11:11

## 2018-08-28 LAB
ABO + RH BLD: NORMAL
ABO + RH BLD: NORMAL
ALBUMIN SERPL-MCNC: 2.6 G/DL (ref 3.5–5)
ALBUMIN/GLOB SERPL: 0.8 G/DL (ref 1.1–2.5)
ALP SERPL-CCNC: 102 U/L (ref 24–120)
ALT SERPL W P-5'-P-CCNC: 47 U/L (ref 0–54)
ANION GAP SERPL CALCULATED.3IONS-SCNC: 8 MMOL/L (ref 4–13)
AST SERPL-CCNC: 64 U/L (ref 7–45)
BASOPHILS # BLD AUTO: 0.06 10*3/MM3 (ref 0–0.2)
BASOPHILS NFR BLD AUTO: 1.1 % (ref 0–2)
BH BB BLOOD EXPIRATION DATE: NORMAL
BH BB BLOOD EXPIRATION DATE: NORMAL
BH BB BLOOD TYPE BARCODE: 5100
BH BB BLOOD TYPE BARCODE: 5100
BH BB DISPENSE STATUS: NORMAL
BH BB DISPENSE STATUS: NORMAL
BH BB PRODUCT CODE: NORMAL
BH BB PRODUCT CODE: NORMAL
BH BB UNIT NUMBER: NORMAL
BH BB UNIT NUMBER: NORMAL
BILIRUB SERPL-MCNC: 1.8 MG/DL (ref 0.1–1)
BUN BLD-MCNC: 34 MG/DL (ref 5–21)
BUN/CREAT SERPL: 13.6 (ref 7–25)
CALCIUM SPEC-SCNC: 8 MG/DL (ref 8.4–10.4)
CHLORIDE SERPL-SCNC: 110 MMOL/L (ref 98–110)
CK SERPL-CCNC: 189 U/L (ref 0–203)
CO2 SERPL-SCNC: 21 MMOL/L (ref 24–31)
CREAT BLD-MCNC: 2.5 MG/DL (ref 0.5–1.4)
CROSSMATCH INTERPRETATION: NORMAL
CROSSMATCH INTERPRETATION: NORMAL
DEPRECATED RDW RBC AUTO: 49 FL (ref 40–54)
EOSINOPHIL # BLD AUTO: 0.66 10*3/MM3 (ref 0–0.7)
EOSINOPHIL NFR BLD AUTO: 12.2 % (ref 0–4)
ERYTHROCYTE [DISTWIDTH] IN BLOOD BY AUTOMATED COUNT: 16.4 % (ref 12–15)
GFR SERPL CREATININE-BSD FRML MDRD: 26 ML/MIN/1.73
GLOBULIN UR ELPH-MCNC: 3.2 GM/DL
GLUCOSE BLD-MCNC: 95 MG/DL (ref 70–100)
HCT VFR BLD AUTO: 27.6 % (ref 40–52)
HGB BLD-MCNC: 9 G/DL (ref 14–18)
IMM GRANULOCYTES # BLD: 0.02 10*3/MM3 (ref 0–0.03)
IMM GRANULOCYTES NFR BLD: 0.4 % (ref 0–5)
INR PPP: 1.44 (ref 0.91–1.09)
LYMPHOCYTES # BLD AUTO: 0.88 10*3/MM3 (ref 0.72–4.86)
LYMPHOCYTES NFR BLD AUTO: 16.2 % (ref 15–45)
MCH RBC QN AUTO: 28 PG (ref 28–32)
MCHC RBC AUTO-ENTMCNC: 32.6 G/DL (ref 33–36)
MCV RBC AUTO: 86 FL (ref 82–95)
MONOCYTES # BLD AUTO: 0.64 10*3/MM3 (ref 0.19–1.3)
MONOCYTES NFR BLD AUTO: 11.8 % (ref 4–12)
NEUTROPHILS # BLD AUTO: 3.16 10*3/MM3 (ref 1.87–8.4)
NEUTROPHILS NFR BLD AUTO: 58.3 % (ref 39–78)
NRBC BLD MANUAL-RTO: 0 /100 WBC (ref 0–0)
PLATELET # BLD AUTO: 189 10*3/MM3 (ref 130–400)
PMV BLD AUTO: 9.1 FL (ref 6–12)
POTASSIUM BLD-SCNC: 4.4 MMOL/L (ref 3.5–5.3)
PROT SERPL-MCNC: 5.8 G/DL (ref 6.3–8.7)
PROTHROMBIN TIME: 18 SECONDS (ref 11.9–14.6)
RBC # BLD AUTO: 3.21 10*6/MM3 (ref 4.8–5.9)
SODIUM BLD-SCNC: 139 MMOL/L (ref 135–145)
UNIT  ABO: NORMAL
UNIT  ABO: NORMAL
UNIT  RH: NORMAL
UNIT  RH: NORMAL
WBC NRBC COR # BLD: 5.42 10*3/MM3 (ref 4.8–10.8)

## 2018-08-28 PROCEDURE — 94760 N-INVAS EAR/PLS OXIMETRY 1: CPT

## 2018-08-28 PROCEDURE — 82550 ASSAY OF CK (CPK): CPT | Performed by: FAMILY MEDICINE

## 2018-08-28 PROCEDURE — 97530 THERAPEUTIC ACTIVITIES: CPT

## 2018-08-28 PROCEDURE — 80053 COMPREHEN METABOLIC PANEL: CPT | Performed by: FAMILY MEDICINE

## 2018-08-28 PROCEDURE — 85025 COMPLETE CBC W/AUTO DIFF WBC: CPT | Performed by: FAMILY MEDICINE

## 2018-08-28 PROCEDURE — 94799 UNLISTED PULMONARY SVC/PX: CPT

## 2018-08-28 PROCEDURE — 25010000002 THIAMINE PER 100 MG: Performed by: INTERNAL MEDICINE

## 2018-08-28 PROCEDURE — 85610 PROTHROMBIN TIME: CPT | Performed by: INTERNAL MEDICINE

## 2018-08-28 PROCEDURE — 63710000001 FLINTSTONES COMPLETE 60 MG CHEWABLE TABLET: Performed by: INTERNAL MEDICINE

## 2018-08-28 PROCEDURE — 25010000002 IRON SUCROSE PER 1 MG: Performed by: INTERNAL MEDICINE

## 2018-08-28 PROCEDURE — 99231 SBSQ HOSP IP/OBS SF/LOW 25: CPT | Performed by: NEUROLOGICAL SURGERY

## 2018-08-28 RX ORDER — SODIUM BICARBONATE 650 MG/1
650 TABLET ORAL 2 TIMES DAILY
Status: DISCONTINUED | OUTPATIENT
Start: 2018-08-28 | End: 2018-09-05 | Stop reason: HOSPADM

## 2018-08-28 RX ADMIN — DIAZEPAM 5 MG: 5 TABLET ORAL at 17:37

## 2018-08-28 RX ADMIN — SODIUM CHLORIDE 150 ML/HR: 9 INJECTION, SOLUTION INTRAVENOUS at 06:45

## 2018-08-28 RX ADMIN — DIAZEPAM 5 MG: 5 TABLET ORAL at 09:44

## 2018-08-28 RX ADMIN — SODIUM BICARBONATE 650 MG: 650 TABLET ORAL at 21:20

## 2018-08-28 RX ADMIN — Medication 1 TABLET: at 08:18

## 2018-08-28 RX ADMIN — IPRATROPIUM BROMIDE AND ALBUTEROL SULFATE 3 ML: 2.5; .5 SOLUTION RESPIRATORY (INHALATION) at 21:51

## 2018-08-28 RX ADMIN — DIAZEPAM 5 MG: 5 TABLET ORAL at 02:15

## 2018-08-28 RX ADMIN — THIAMINE HYDROCHLORIDE 100 MG: 100 INJECTION, SOLUTION INTRAMUSCULAR; INTRAVENOUS at 08:17

## 2018-08-28 RX ADMIN — SODIUM CHLORIDE 100 ML/HR: 9 INJECTION, SOLUTION INTRAVENOUS at 16:31

## 2018-08-28 RX ADMIN — ACETAMINOPHEN 650 MG: 325 TABLET, FILM COATED ORAL at 21:28

## 2018-08-28 RX ADMIN — IRON SUCROSE 100 MG: 20 INJECTION, SOLUTION INTRAVENOUS at 21:20

## 2018-08-28 RX ADMIN — ACETAMINOPHEN 650 MG: 325 TABLET, FILM COATED ORAL at 06:48

## 2018-08-28 RX ADMIN — SODIUM BICARBONATE 650 MG: 650 TABLET ORAL at 10:22

## 2018-08-29 ENCOUNTER — APPOINTMENT (OUTPATIENT)
Dept: CT IMAGING | Facility: HOSPITAL | Age: 68
End: 2018-08-29

## 2018-08-29 ENCOUNTER — APPOINTMENT (OUTPATIENT)
Dept: GENERAL RADIOLOGY | Facility: HOSPITAL | Age: 68
End: 2018-08-29

## 2018-08-29 LAB
ALBUMIN SERPL-MCNC: 2.2 G/DL (ref 3.5–5)
ALBUMIN/GLOB SERPL: 0.7 G/DL (ref 1.1–2.5)
ALP SERPL-CCNC: 96 U/L (ref 24–120)
ALT SERPL W P-5'-P-CCNC: 41 U/L (ref 0–54)
ANION GAP SERPL CALCULATED.3IONS-SCNC: 7 MMOL/L (ref 4–13)
AST SERPL-CCNC: 47 U/L (ref 7–45)
BASOPHILS # BLD AUTO: 0.06 10*3/MM3 (ref 0–0.2)
BASOPHILS NFR BLD AUTO: 1.2 % (ref 0–2)
BILIRUB SERPL-MCNC: 0.8 MG/DL (ref 0.1–1)
BUN BLD-MCNC: 33 MG/DL (ref 5–21)
BUN/CREAT SERPL: 17.9 (ref 7–25)
CALCIUM SPEC-SCNC: 7.8 MG/DL (ref 8.4–10.4)
CHLORIDE SERPL-SCNC: 111 MMOL/L (ref 98–110)
CO2 SERPL-SCNC: 22 MMOL/L (ref 24–31)
CREAT BLD-MCNC: 1.84 MG/DL (ref 0.5–1.4)
DEPRECATED RDW RBC AUTO: 50.4 FL (ref 40–54)
EOSINOPHIL # BLD AUTO: 0.74 10*3/MM3 (ref 0–0.7)
EOSINOPHIL NFR BLD AUTO: 14.8 % (ref 0–4)
ERYTHROCYTE [DISTWIDTH] IN BLOOD BY AUTOMATED COUNT: 16.7 % (ref 12–15)
GFR SERPL CREATININE-BSD FRML MDRD: 37 ML/MIN/1.73
GLOBULIN UR ELPH-MCNC: 3.2 GM/DL
GLUCOSE BLD-MCNC: 110 MG/DL (ref 70–100)
HCT VFR BLD AUTO: 27.5 % (ref 40–52)
HGB BLD-MCNC: 8.9 G/DL (ref 14–18)
IMM GRANULOCYTES # BLD: 0.02 10*3/MM3 (ref 0–0.03)
IMM GRANULOCYTES NFR BLD: 0.4 % (ref 0–5)
LYMPHOCYTES # BLD AUTO: 0.82 10*3/MM3 (ref 0.72–4.86)
LYMPHOCYTES NFR BLD AUTO: 16.4 % (ref 15–45)
MAGNESIUM SERPL-MCNC: 1.9 MG/DL (ref 1.4–2.2)
MCH RBC QN AUTO: 27.6 PG (ref 28–32)
MCHC RBC AUTO-ENTMCNC: 32.4 G/DL (ref 33–36)
MCV RBC AUTO: 85.4 FL (ref 82–95)
MONOCYTES # BLD AUTO: 0.65 10*3/MM3 (ref 0.19–1.3)
MONOCYTES NFR BLD AUTO: 13 % (ref 4–12)
NEUTROPHILS # BLD AUTO: 2.7 10*3/MM3 (ref 1.87–8.4)
NEUTROPHILS NFR BLD AUTO: 54.2 % (ref 39–78)
NRBC BLD MANUAL-RTO: 0 /100 WBC (ref 0–0)
PLATELET # BLD AUTO: 168 10*3/MM3 (ref 130–400)
PMV BLD AUTO: 8.7 FL (ref 6–12)
POTASSIUM BLD-SCNC: 4.2 MMOL/L (ref 3.5–5.3)
PROT SERPL-MCNC: 5.4 G/DL (ref 6.3–8.7)
RBC # BLD AUTO: 3.22 10*6/MM3 (ref 4.8–5.9)
SODIUM BLD-SCNC: 140 MMOL/L (ref 135–145)
WBC NRBC COR # BLD: 4.99 10*3/MM3 (ref 4.8–10.8)

## 2018-08-29 PROCEDURE — 99231 SBSQ HOSP IP/OBS SF/LOW 25: CPT | Performed by: NEUROLOGICAL SURGERY

## 2018-08-29 PROCEDURE — 85025 COMPLETE CBC W/AUTO DIFF WBC: CPT | Performed by: FAMILY MEDICINE

## 2018-08-29 PROCEDURE — 71045 X-RAY EXAM CHEST 1 VIEW: CPT

## 2018-08-29 PROCEDURE — 94799 UNLISTED PULMONARY SVC/PX: CPT

## 2018-08-29 PROCEDURE — 25010000002 CEFTRIAXONE PER 250 MG: Performed by: INTERNAL MEDICINE

## 2018-08-29 PROCEDURE — 97535 SELF CARE MNGMENT TRAINING: CPT

## 2018-08-29 PROCEDURE — 71250 CT THORAX DX C-: CPT

## 2018-08-29 PROCEDURE — 25010000002 THIAMINE PER 100 MG: Performed by: INTERNAL MEDICINE

## 2018-08-29 PROCEDURE — 97110 THERAPEUTIC EXERCISES: CPT

## 2018-08-29 PROCEDURE — 70450 CT HEAD/BRAIN W/O DYE: CPT

## 2018-08-29 PROCEDURE — 94760 N-INVAS EAR/PLS OXIMETRY 1: CPT

## 2018-08-29 PROCEDURE — 25010000002 IRON SUCROSE PER 1 MG: Performed by: INTERNAL MEDICINE

## 2018-08-29 PROCEDURE — 63710000001 FLINTSTONES COMPLETE 60 MG CHEWABLE TABLET: Performed by: INTERNAL MEDICINE

## 2018-08-29 PROCEDURE — 83735 ASSAY OF MAGNESIUM: CPT | Performed by: NURSE PRACTITIONER

## 2018-08-29 PROCEDURE — 80053 COMPREHEN METABOLIC PANEL: CPT | Performed by: FAMILY MEDICINE

## 2018-08-29 PROCEDURE — 97530 THERAPEUTIC ACTIVITIES: CPT

## 2018-08-29 RX ORDER — DIAZEPAM 5 MG/1
5 TABLET ORAL EVERY 12 HOURS SCHEDULED
Status: DISCONTINUED | OUTPATIENT
Start: 2018-08-29 | End: 2018-08-31

## 2018-08-29 RX ORDER — GUAIFENESIN 600 MG/1
1200 TABLET, EXTENDED RELEASE ORAL EVERY 12 HOURS SCHEDULED
Status: DISCONTINUED | OUTPATIENT
Start: 2018-08-29 | End: 2018-09-05 | Stop reason: HOSPADM

## 2018-08-29 RX ORDER — IPRATROPIUM BROMIDE AND ALBUTEROL SULFATE 2.5; .5 MG/3ML; MG/3ML
3 SOLUTION RESPIRATORY (INHALATION)
Status: DISCONTINUED | OUTPATIENT
Start: 2018-08-29 | End: 2018-08-31

## 2018-08-29 RX ADMIN — IRON SUCROSE 100 MG: 20 INJECTION, SOLUTION INTRAVENOUS at 21:31

## 2018-08-29 RX ADMIN — THIAMINE HYDROCHLORIDE 100 MG: 100 INJECTION, SOLUTION INTRAMUSCULAR; INTRAVENOUS at 10:39

## 2018-08-29 RX ADMIN — IPRATROPIUM BROMIDE AND ALBUTEROL SULFATE 3 ML: 2.5; .5 SOLUTION RESPIRATORY (INHALATION) at 14:57

## 2018-08-29 RX ADMIN — GUAIFENESIN 1200 MG: 600 TABLET, EXTENDED RELEASE ORAL at 21:31

## 2018-08-29 RX ADMIN — IPRATROPIUM BROMIDE AND ALBUTEROL SULFATE 3 ML: 2.5; .5 SOLUTION RESPIRATORY (INHALATION) at 04:30

## 2018-08-29 RX ADMIN — IPRATROPIUM BROMIDE AND ALBUTEROL SULFATE 3 ML: 2.5; .5 SOLUTION RESPIRATORY (INHALATION) at 23:13

## 2018-08-29 RX ADMIN — DIAZEPAM 5 MG: 5 TABLET ORAL at 21:31

## 2018-08-29 RX ADMIN — SODIUM BICARBONATE 650 MG: 650 TABLET ORAL at 10:38

## 2018-08-29 RX ADMIN — GUAIFENESIN 1200 MG: 600 TABLET, EXTENDED RELEASE ORAL at 15:57

## 2018-08-29 RX ADMIN — DIAZEPAM 5 MG: 5 TABLET ORAL at 10:38

## 2018-08-29 RX ADMIN — SODIUM BICARBONATE 650 MG: 650 TABLET ORAL at 21:31

## 2018-08-29 RX ADMIN — CEFTRIAXONE SODIUM 1 G: 1 INJECTION, POWDER, FOR SOLUTION INTRAMUSCULAR; INTRAVENOUS at 18:41

## 2018-08-29 RX ADMIN — IPRATROPIUM BROMIDE AND ALBUTEROL SULFATE 3 ML: 2.5; .5 SOLUTION RESPIRATORY (INHALATION) at 11:44

## 2018-08-29 RX ADMIN — ACETAMINOPHEN 650 MG: 325 TABLET, FILM COATED ORAL at 12:24

## 2018-08-29 RX ADMIN — DIAZEPAM 5 MG: 5 TABLET ORAL at 01:27

## 2018-08-29 RX ADMIN — IPRATROPIUM BROMIDE AND ALBUTEROL SULFATE 3 ML: 2.5; .5 SOLUTION RESPIRATORY (INHALATION) at 18:31

## 2018-08-29 RX ADMIN — Medication 1 TABLET: at 10:38

## 2018-08-29 RX ADMIN — SODIUM CHLORIDE 100 ML/HR: 9 INJECTION, SOLUTION INTRAVENOUS at 06:34

## 2018-08-30 ENCOUNTER — APPOINTMENT (OUTPATIENT)
Dept: GENERAL RADIOLOGY | Facility: HOSPITAL | Age: 68
End: 2018-08-30

## 2018-08-30 ENCOUNTER — APPOINTMENT (OUTPATIENT)
Dept: ULTRASOUND IMAGING | Facility: HOSPITAL | Age: 68
End: 2018-08-30

## 2018-08-30 ENCOUNTER — APPOINTMENT (OUTPATIENT)
Dept: MRI IMAGING | Facility: HOSPITAL | Age: 68
End: 2018-08-30

## 2018-08-30 LAB
ALBUMIN SERPL-MCNC: 2.2 G/DL (ref 3.5–5)
ALBUMIN/GLOB SERPL: 0.7 G/DL (ref 1.1–2.5)
ALP SERPL-CCNC: 87 U/L (ref 24–120)
ALT SERPL W P-5'-P-CCNC: 37 U/L (ref 0–54)
AMMONIA BLD-SCNC: 16 UMOL/L (ref 9–33)
ANION GAP SERPL CALCULATED.3IONS-SCNC: 8 MMOL/L (ref 4–13)
APTT PPP: 43.8 SECONDS (ref 24.1–34.8)
AST SERPL-CCNC: 41 U/L (ref 7–45)
BILIRUB SERPL-MCNC: 0.7 MG/DL (ref 0.1–1)
BUN BLD-MCNC: 28 MG/DL (ref 5–21)
BUN/CREAT SERPL: 18.8 (ref 7–25)
CALCIUM SPEC-SCNC: 8.1 MG/DL (ref 8.4–10.4)
CHLORIDE SERPL-SCNC: 112 MMOL/L (ref 98–110)
CO2 SERPL-SCNC: 23 MMOL/L (ref 24–31)
CREAT BLD-MCNC: 1.49 MG/DL (ref 0.5–1.4)
DEPRECATED RDW RBC AUTO: 52.8 FL (ref 40–54)
ERYTHROCYTE [DISTWIDTH] IN BLOOD BY AUTOMATED COUNT: 17.2 % (ref 12–15)
GFR SERPL CREATININE-BSD FRML MDRD: 47 ML/MIN/1.73
GLOBULIN UR ELPH-MCNC: 3.1 GM/DL
GLUCOSE BLD-MCNC: 117 MG/DL (ref 70–100)
HCT VFR BLD AUTO: 27.2 % (ref 40–52)
HGB BLD-MCNC: 8.7 G/DL (ref 14–18)
INR PPP: 1.48 (ref 0.91–1.09)
MCH RBC QN AUTO: 28 PG (ref 28–32)
MCHC RBC AUTO-ENTMCNC: 32 G/DL (ref 33–36)
MCV RBC AUTO: 87.5 FL (ref 82–95)
PLATELET # BLD AUTO: 144 10*3/MM3 (ref 130–400)
PMV BLD AUTO: 8.8 FL (ref 6–12)
POTASSIUM BLD-SCNC: 4.1 MMOL/L (ref 3.5–5.3)
PROT SERPL-MCNC: 5.3 G/DL (ref 6.3–8.7)
PROTHROMBIN TIME: 18.4 SECONDS (ref 11.9–14.6)
RBC # BLD AUTO: 3.11 10*6/MM3 (ref 4.8–5.9)
SODIUM BLD-SCNC: 143 MMOL/L (ref 135–145)
WBC NRBC COR # BLD: 5.05 10*3/MM3 (ref 4.8–10.8)

## 2018-08-30 PROCEDURE — 82140 ASSAY OF AMMONIA: CPT | Performed by: INTERNAL MEDICINE

## 2018-08-30 PROCEDURE — 76604 US EXAM CHEST: CPT

## 2018-08-30 PROCEDURE — 25010000002 IRON SUCROSE PER 1 MG: Performed by: INTERNAL MEDICINE

## 2018-08-30 PROCEDURE — 87075 CULTR BACTERIA EXCEPT BLOOD: CPT | Performed by: INTERNAL MEDICINE

## 2018-08-30 PROCEDURE — A9577 INJ MULTIHANCE: HCPCS | Performed by: INTERNAL MEDICINE

## 2018-08-30 PROCEDURE — 94799 UNLISTED PULMONARY SVC/PX: CPT

## 2018-08-30 PROCEDURE — 87205 SMEAR GRAM STAIN: CPT | Performed by: INTERNAL MEDICINE

## 2018-08-30 PROCEDURE — 87116 MYCOBACTERIA CULTURE: CPT | Performed by: INTERNAL MEDICINE

## 2018-08-30 PROCEDURE — 82042 OTHER SOURCE ALBUMIN QUAN EA: CPT | Performed by: INTERNAL MEDICINE

## 2018-08-30 PROCEDURE — 83615 LACTATE (LD) (LDH) ENZYME: CPT | Performed by: INTERNAL MEDICINE

## 2018-08-30 PROCEDURE — 76942 ECHO GUIDE FOR BIOPSY: CPT

## 2018-08-30 PROCEDURE — 88312 SPECIAL STAINS GROUP 1: CPT | Performed by: INTERNAL MEDICINE

## 2018-08-30 PROCEDURE — 85027 COMPLETE CBC AUTOMATED: CPT | Performed by: INTERNAL MEDICINE

## 2018-08-30 PROCEDURE — 88112 CYTOPATH CELL ENHANCE TECH: CPT | Performed by: INTERNAL MEDICINE

## 2018-08-30 PROCEDURE — 0W993ZZ DRAINAGE OF RIGHT PLEURAL CAVITY, PERCUTANEOUS APPROACH: ICD-10-PCS | Performed by: RADIOLOGY

## 2018-08-30 PROCEDURE — 87102 FUNGUS ISOLATION CULTURE: CPT | Performed by: INTERNAL MEDICINE

## 2018-08-30 PROCEDURE — 84157 ASSAY OF PROTEIN OTHER: CPT | Performed by: INTERNAL MEDICINE

## 2018-08-30 PROCEDURE — 99231 SBSQ HOSP IP/OBS SF/LOW 25: CPT | Performed by: NEUROLOGICAL SURGERY

## 2018-08-30 PROCEDURE — 87206 SMEAR FLUORESCENT/ACID STAI: CPT | Performed by: INTERNAL MEDICINE

## 2018-08-30 PROCEDURE — 25010000002 CEFTRIAXONE PER 250 MG: Performed by: INTERNAL MEDICINE

## 2018-08-30 PROCEDURE — 0 GADOBENATE DIMEGLUMINE 529 MG/ML SOLUTION: Performed by: INTERNAL MEDICINE

## 2018-08-30 PROCEDURE — 85730 THROMBOPLASTIN TIME PARTIAL: CPT | Performed by: INTERNAL MEDICINE

## 2018-08-30 PROCEDURE — 97116 GAIT TRAINING THERAPY: CPT

## 2018-08-30 PROCEDURE — 97110 THERAPEUTIC EXERCISES: CPT

## 2018-08-30 PROCEDURE — 94760 N-INVAS EAR/PLS OXIMETRY 1: CPT

## 2018-08-30 PROCEDURE — 87070 CULTURE OTHR SPECIMN AEROBIC: CPT | Performed by: INTERNAL MEDICINE

## 2018-08-30 PROCEDURE — 71045 X-RAY EXAM CHEST 1 VIEW: CPT

## 2018-08-30 PROCEDURE — 87015 SPECIMEN INFECT AGNT CONCNTJ: CPT | Performed by: INTERNAL MEDICINE

## 2018-08-30 PROCEDURE — 85610 PROTHROMBIN TIME: CPT | Performed by: INTERNAL MEDICINE

## 2018-08-30 PROCEDURE — 70553 MRI BRAIN STEM W/O & W/DYE: CPT

## 2018-08-30 PROCEDURE — 88305 TISSUE EXAM BY PATHOLOGIST: CPT | Performed by: INTERNAL MEDICINE

## 2018-08-30 PROCEDURE — 80053 COMPREHEN METABOLIC PANEL: CPT | Performed by: FAMILY MEDICINE

## 2018-08-30 PROCEDURE — 25010000002 VITAMIN K1 PER 1 MG: Performed by: INTERNAL MEDICINE

## 2018-08-30 RX ORDER — PHYTONADIONE 10 MG/ML
10 INJECTION, EMULSION INTRAMUSCULAR; INTRAVENOUS; SUBCUTANEOUS ONCE
Status: COMPLETED | OUTPATIENT
Start: 2018-08-30 | End: 2018-08-30

## 2018-08-30 RX ORDER — HYDROXYZINE HYDROCHLORIDE 10 MG/1
10 TABLET, FILM COATED ORAL 4 TIMES DAILY
Status: DISCONTINUED | OUTPATIENT
Start: 2018-08-30 | End: 2018-09-02

## 2018-08-30 RX ORDER — LACTULOSE 20 G/30ML
30 SOLUTION ORAL DAILY
Status: DISCONTINUED | OUTPATIENT
Start: 2018-08-30 | End: 2018-08-31

## 2018-08-30 RX ORDER — PROPRANOLOL HYDROCHLORIDE 10 MG/1
10 TABLET ORAL EVERY 12 HOURS SCHEDULED
Status: DISCONTINUED | OUTPATIENT
Start: 2018-08-30 | End: 2018-09-05 | Stop reason: HOSPADM

## 2018-08-30 RX ADMIN — HYDROXYZINE HYDROCHLORIDE 10 MG: 10 TABLET ORAL at 17:49

## 2018-08-30 RX ADMIN — SODIUM CHLORIDE 50 ML/HR: 9 INJECTION, SOLUTION INTRAVENOUS at 05:57

## 2018-08-30 RX ADMIN — GADOBENATE DIMEGLUMINE 10 ML: 529 INJECTION, SOLUTION INTRAVENOUS at 15:15

## 2018-08-30 RX ADMIN — DIAZEPAM 5 MG: 5 TABLET ORAL at 21:04

## 2018-08-30 RX ADMIN — IPRATROPIUM BROMIDE AND ALBUTEROL SULFATE 3 ML: 2.5; .5 SOLUTION RESPIRATORY (INHALATION) at 03:09

## 2018-08-30 RX ADMIN — PROPRANOLOL HYDROCHLORIDE 10 MG: 10 TABLET ORAL at 15:40

## 2018-08-30 RX ADMIN — LACTULOSE 30 G: 20 SOLUTION ORAL at 15:40

## 2018-08-30 RX ADMIN — SODIUM BICARBONATE 650 MG: 650 TABLET ORAL at 21:04

## 2018-08-30 RX ADMIN — HYDROXYZINE HYDROCHLORIDE 10 MG: 10 TABLET ORAL at 21:06

## 2018-08-30 RX ADMIN — IPRATROPIUM BROMIDE AND ALBUTEROL SULFATE 3 ML: 2.5; .5 SOLUTION RESPIRATORY (INHALATION) at 19:38

## 2018-08-30 RX ADMIN — CEFTRIAXONE SODIUM 1 G: 1 INJECTION, POWDER, FOR SOLUTION INTRAMUSCULAR; INTRAVENOUS at 19:02

## 2018-08-30 RX ADMIN — IPRATROPIUM BROMIDE AND ALBUTEROL SULFATE 3 ML: 2.5; .5 SOLUTION RESPIRATORY (INHALATION) at 10:34

## 2018-08-30 RX ADMIN — GUAIFENESIN 1200 MG: 600 TABLET, EXTENDED RELEASE ORAL at 21:04

## 2018-08-30 RX ADMIN — IPRATROPIUM BROMIDE AND ALBUTEROL SULFATE 3 ML: 2.5; .5 SOLUTION RESPIRATORY (INHALATION) at 07:10

## 2018-08-30 RX ADMIN — PHYTONADIONE 10 MG: 10 INJECTION, EMULSION INTRAMUSCULAR; INTRAVENOUS; SUBCUTANEOUS at 15:40

## 2018-08-30 RX ADMIN — IPRATROPIUM BROMIDE AND ALBUTEROL SULFATE 3 ML: 2.5; .5 SOLUTION RESPIRATORY (INHALATION) at 23:09

## 2018-08-30 RX ADMIN — ACETAMINOPHEN 650 MG: 325 TABLET, FILM COATED ORAL at 21:15

## 2018-08-30 RX ADMIN — IRON SUCROSE 100 MG: 20 INJECTION, SOLUTION INTRAVENOUS at 21:04

## 2018-08-31 ENCOUNTER — APPOINTMENT (OUTPATIENT)
Dept: CT IMAGING | Facility: HOSPITAL | Age: 68
End: 2018-08-31

## 2018-08-31 LAB
ALBUMIN FLD-MCNC: 0.7 G/DL
ALBUMIN SERPL-MCNC: 2.3 G/DL (ref 3.5–5)
ALBUMIN/GLOB SERPL: 0.7 G/DL (ref 1.1–2.5)
ALP SERPL-CCNC: 74 U/L (ref 24–120)
ALT SERPL W P-5'-P-CCNC: 37 U/L (ref 0–54)
ANION GAP SERPL CALCULATED.3IONS-SCNC: 5 MMOL/L (ref 4–13)
AST SERPL-CCNC: 51 U/L (ref 7–45)
BILIRUB SERPL-MCNC: 1 MG/DL (ref 0.1–1)
BUN BLD-MCNC: 29 MG/DL (ref 5–21)
BUN/CREAT SERPL: 22 (ref 7–25)
CALCIUM SPEC-SCNC: 8.5 MG/DL (ref 8.4–10.4)
CHLORIDE SERPL-SCNC: 113 MMOL/L (ref 98–110)
CO2 SERPL-SCNC: 24 MMOL/L (ref 24–31)
CREAT BLD-MCNC: 1.32 MG/DL (ref 0.5–1.4)
GFR SERPL CREATININE-BSD FRML MDRD: 54 ML/MIN/1.73
GLOBULIN UR ELPH-MCNC: 3.3 GM/DL
GLUCOSE BLD-MCNC: 72 MG/DL (ref 70–100)
HCT VFR BLD AUTO: 27.5 % (ref 40–52)
HGB BLD-MCNC: 8.9 G/DL (ref 14–18)
INR PPP: 1.42 (ref 0.91–1.09)
LDH FLD-CCNC: 107 IU/L
POTASSIUM BLD-SCNC: 4.8 MMOL/L (ref 3.5–5.3)
PROT SERPL-MCNC: 5.6 G/DL (ref 6.3–8.7)
PROTHROMBIN TIME: 17.8 SECONDS (ref 11.9–14.6)
SODIUM BLD-SCNC: 142 MMOL/L (ref 135–145)

## 2018-08-31 PROCEDURE — 25010000002 CEFTRIAXONE PER 250 MG: Performed by: INTERNAL MEDICINE

## 2018-08-31 PROCEDURE — 70450 CT HEAD/BRAIN W/O DYE: CPT

## 2018-08-31 PROCEDURE — 99231 SBSQ HOSP IP/OBS SF/LOW 25: CPT | Performed by: NEUROLOGICAL SURGERY

## 2018-08-31 PROCEDURE — 85014 HEMATOCRIT: CPT | Performed by: INTERNAL MEDICINE

## 2018-08-31 PROCEDURE — P9612 CATHETERIZE FOR URINE SPEC: HCPCS

## 2018-08-31 PROCEDURE — 94760 N-INVAS EAR/PLS OXIMETRY 1: CPT

## 2018-08-31 PROCEDURE — 94799 UNLISTED PULMONARY SVC/PX: CPT

## 2018-08-31 PROCEDURE — 85018 HEMOGLOBIN: CPT | Performed by: INTERNAL MEDICINE

## 2018-08-31 PROCEDURE — 80053 COMPREHEN METABOLIC PANEL: CPT | Performed by: FAMILY MEDICINE

## 2018-08-31 PROCEDURE — 85610 PROTHROMBIN TIME: CPT | Performed by: INTERNAL MEDICINE

## 2018-08-31 RX ORDER — FUROSEMIDE 20 MG/1
20 TABLET ORAL DAILY
Status: DISCONTINUED | OUTPATIENT
Start: 2018-08-31 | End: 2018-09-02

## 2018-08-31 RX ORDER — ALBUMIN (HUMAN) 12.5 G/50ML
12.5 SOLUTION INTRAVENOUS ONCE
Status: CANCELLED | OUTPATIENT
Start: 2018-08-31 | End: 2018-08-31

## 2018-08-31 RX ORDER — IPRATROPIUM BROMIDE AND ALBUTEROL SULFATE 2.5; .5 MG/3ML; MG/3ML
3 SOLUTION RESPIRATORY (INHALATION) EVERY 4 HOURS PRN
Status: DISCONTINUED | OUTPATIENT
Start: 2018-08-31 | End: 2018-09-05 | Stop reason: HOSPADM

## 2018-08-31 RX ORDER — LACTULOSE 20 G/30ML
30 SOLUTION ORAL 3 TIMES DAILY
Status: DISCONTINUED | OUTPATIENT
Start: 2018-08-31 | End: 2018-08-31

## 2018-08-31 RX ADMIN — CEFTRIAXONE SODIUM 1 G: 1 INJECTION, POWDER, FOR SOLUTION INTRAMUSCULAR; INTRAVENOUS at 17:48

## 2018-08-31 RX ADMIN — HYDROXYZINE HYDROCHLORIDE 10 MG: 10 TABLET ORAL at 14:08

## 2018-08-31 RX ADMIN — IPRATROPIUM BROMIDE AND ALBUTEROL SULFATE 3 ML: 2.5; .5 SOLUTION RESPIRATORY (INHALATION) at 19:42

## 2018-08-31 RX ADMIN — LACTULOSE 300 ML: 10 SOLUTION ORAL at 13:42

## 2018-08-31 RX ADMIN — IPRATROPIUM BROMIDE AND ALBUTEROL SULFATE 3 ML: 2.5; .5 SOLUTION RESPIRATORY (INHALATION) at 03:10

## 2018-08-31 RX ADMIN — FUROSEMIDE 20 MG: 20 TABLET ORAL at 14:15

## 2018-08-31 RX ADMIN — ACETAMINOPHEN 650 MG: 325 TABLET, FILM COATED ORAL at 21:04

## 2018-08-31 RX ADMIN — GUAIFENESIN 1200 MG: 600 TABLET, EXTENDED RELEASE ORAL at 21:04

## 2018-08-31 RX ADMIN — DIAZEPAM 5 MG: 5 TABLET ORAL at 09:44

## 2018-08-31 RX ADMIN — HYDROXYZINE HYDROCHLORIDE 10 MG: 10 TABLET ORAL at 21:04

## 2018-08-31 RX ADMIN — IPRATROPIUM BROMIDE AND ALBUTEROL SULFATE 3 ML: 2.5; .5 SOLUTION RESPIRATORY (INHALATION) at 08:25

## 2018-08-31 RX ADMIN — HYDROXYZINE HYDROCHLORIDE 10 MG: 10 TABLET ORAL at 09:44

## 2018-08-31 RX ADMIN — HYDROXYZINE HYDROCHLORIDE 10 MG: 10 TABLET ORAL at 17:49

## 2018-08-31 RX ADMIN — PROPRANOLOL HYDROCHLORIDE 10 MG: 10 TABLET ORAL at 09:44

## 2018-08-31 RX ADMIN — SODIUM BICARBONATE 650 MG: 650 TABLET ORAL at 21:04

## 2018-09-01 LAB
ALBUMIN SERPL-MCNC: 2.4 G/DL (ref 3.5–5)
ALBUMIN/GLOB SERPL: 0.8 G/DL (ref 1.1–2.5)
ALP SERPL-CCNC: 77 U/L (ref 24–120)
ALT SERPL W P-5'-P-CCNC: 47 U/L (ref 0–54)
AMMONIA BLD-SCNC: 32 UMOL/L (ref 9–33)
ANION GAP SERPL CALCULATED.3IONS-SCNC: 8 MMOL/L (ref 4–13)
AST SERPL-CCNC: 42 U/L (ref 7–45)
BACTERIA UR QL AUTO: ABNORMAL /HPF
BILIRUB SERPL-MCNC: 0.7 MG/DL (ref 0.1–1)
BILIRUB UR QL STRIP: ABNORMAL
BUN BLD-MCNC: 31 MG/DL (ref 5–21)
BUN/CREAT SERPL: 20 (ref 7–25)
CALCIUM SPEC-SCNC: 8.7 MG/DL (ref 8.4–10.4)
CHLORIDE SERPL-SCNC: 113 MMOL/L (ref 98–110)
CLARITY UR: ABNORMAL
CO2 SERPL-SCNC: 22 MMOL/L (ref 24–31)
COLOR UR: ABNORMAL
CREAT BLD-MCNC: 1.55 MG/DL (ref 0.5–1.4)
DEPRECATED RDW RBC AUTO: 54.5 FL (ref 40–54)
ERYTHROCYTE [DISTWIDTH] IN BLOOD BY AUTOMATED COUNT: 17.8 % (ref 12–15)
GFR SERPL CREATININE-BSD FRML MDRD: 45 ML/MIN/1.73
GLOBULIN UR ELPH-MCNC: 3.1 GM/DL
GLUCOSE BLD-MCNC: 71 MG/DL (ref 70–100)
GLUCOSE UR STRIP-MCNC: NEGATIVE MG/DL
GRAN CASTS URNS QL MICRO: ABNORMAL /LPF
HCT VFR BLD AUTO: 28.6 % (ref 40–52)
HGB BLD-MCNC: 9.1 G/DL (ref 14–18)
HGB UR QL STRIP.AUTO: ABNORMAL
HYALINE CASTS UR QL AUTO: ABNORMAL /LPF
KETONES UR QL STRIP: ABNORMAL
LEUKOCYTE ESTERASE UR QL STRIP.AUTO: ABNORMAL
MCH RBC QN AUTO: 28.2 PG (ref 28–32)
MCHC RBC AUTO-ENTMCNC: 31.8 G/DL (ref 33–36)
MCV RBC AUTO: 88.5 FL (ref 82–95)
NITRITE UR QL STRIP: NEGATIVE
PH UR STRIP.AUTO: <=5 [PH] (ref 5–8)
PLATELET # BLD AUTO: 151 10*3/MM3 (ref 130–400)
PMV BLD AUTO: 9.3 FL (ref 6–12)
POTASSIUM BLD-SCNC: 4.6 MMOL/L (ref 3.5–5.3)
PROT FLD-MCNC: 1.6 G/DL
PROT SERPL-MCNC: 5.5 G/DL (ref 6.3–8.7)
PROT UR QL STRIP: ABNORMAL
RBC # BLD AUTO: 3.23 10*6/MM3 (ref 4.8–5.9)
RBC # UR: ABNORMAL /HPF
REF LAB TEST METHOD: ABNORMAL
SODIUM BLD-SCNC: 143 MMOL/L (ref 135–145)
SP GR UR STRIP: 1.02 (ref 1–1.03)
SQUAMOUS #/AREA URNS HPF: ABNORMAL /HPF
UROBILINOGEN UR QL STRIP: ABNORMAL
WBC NRBC COR # BLD: 5.3 10*3/MM3 (ref 4.8–10.8)
WBC UR QL AUTO: ABNORMAL /HPF
YEAST URNS QL MICRO: ABNORMAL /HPF

## 2018-09-01 PROCEDURE — 87086 URINE CULTURE/COLONY COUNT: CPT | Performed by: INTERNAL MEDICINE

## 2018-09-01 PROCEDURE — G8997 SWALLOW GOAL STATUS: HCPCS

## 2018-09-01 PROCEDURE — G8996 SWALLOW CURRENT STATUS: HCPCS

## 2018-09-01 PROCEDURE — 63710000001 FLINTSTONES COMPLETE 60 MG CHEWABLE TABLET: Performed by: INTERNAL MEDICINE

## 2018-09-01 PROCEDURE — 97530 THERAPEUTIC ACTIVITIES: CPT

## 2018-09-01 PROCEDURE — 92610 EVALUATE SWALLOWING FUNCTION: CPT

## 2018-09-01 PROCEDURE — 85027 COMPLETE CBC AUTOMATED: CPT | Performed by: INTERNAL MEDICINE

## 2018-09-01 PROCEDURE — 25010000002 CEFTRIAXONE PER 250 MG: Performed by: INTERNAL MEDICINE

## 2018-09-01 PROCEDURE — 80053 COMPREHEN METABOLIC PANEL: CPT | Performed by: FAMILY MEDICINE

## 2018-09-01 PROCEDURE — 99231 SBSQ HOSP IP/OBS SF/LOW 25: CPT | Performed by: NEUROLOGICAL SURGERY

## 2018-09-01 PROCEDURE — 81001 URINALYSIS AUTO W/SCOPE: CPT | Performed by: INTERNAL MEDICINE

## 2018-09-01 PROCEDURE — 82140 ASSAY OF AMMONIA: CPT | Performed by: INTERNAL MEDICINE

## 2018-09-01 RX ORDER — SODIUM CHLORIDE 9 MG/ML
50 INJECTION, SOLUTION INTRAVENOUS CONTINUOUS
Status: DISCONTINUED | OUTPATIENT
Start: 2018-09-01 | End: 2018-09-02

## 2018-09-01 RX ORDER — PANTOPRAZOLE SODIUM 40 MG/1
40 TABLET, DELAYED RELEASE ORAL
Status: DISCONTINUED | OUTPATIENT
Start: 2018-09-01 | End: 2018-09-05 | Stop reason: HOSPADM

## 2018-09-01 RX ORDER — LACTULOSE 20 G/30ML
30 SOLUTION ORAL 2 TIMES DAILY
Status: DISCONTINUED | OUTPATIENT
Start: 2018-09-01 | End: 2018-09-02

## 2018-09-01 RX ADMIN — HYDROXYZINE HYDROCHLORIDE 10 MG: 10 TABLET ORAL at 11:50

## 2018-09-01 RX ADMIN — SODIUM CHLORIDE 50 ML/HR: 9 INJECTION, SOLUTION INTRAVENOUS at 17:11

## 2018-09-01 RX ADMIN — PROPRANOLOL HYDROCHLORIDE 10 MG: 10 TABLET ORAL at 09:08

## 2018-09-01 RX ADMIN — PANTOPRAZOLE SODIUM 40 MG: 40 TABLET, DELAYED RELEASE ORAL at 11:48

## 2018-09-01 RX ADMIN — SODIUM BICARBONATE 650 MG: 650 TABLET ORAL at 21:47

## 2018-09-01 RX ADMIN — LACTULOSE 30 G: 20 SOLUTION ORAL at 11:49

## 2018-09-01 RX ADMIN — FUROSEMIDE 20 MG: 20 TABLET ORAL at 09:08

## 2018-09-01 RX ADMIN — SODIUM BICARBONATE 650 MG: 650 TABLET ORAL at 09:08

## 2018-09-01 RX ADMIN — ACETAMINOPHEN 650 MG: 325 TABLET, FILM COATED ORAL at 18:39

## 2018-09-01 RX ADMIN — CEFTRIAXONE SODIUM 1 G: 1 INJECTION, POWDER, FOR SOLUTION INTRAMUSCULAR; INTRAVENOUS at 17:59

## 2018-09-01 RX ADMIN — GUAIFENESIN 1200 MG: 600 TABLET, EXTENDED RELEASE ORAL at 09:08

## 2018-09-01 RX ADMIN — LACTULOSE 30 G: 20 SOLUTION ORAL at 21:47

## 2018-09-01 RX ADMIN — HYDROXYZINE HYDROCHLORIDE 10 MG: 10 TABLET ORAL at 09:08

## 2018-09-01 RX ADMIN — ACETAMINOPHEN 650 MG: 325 TABLET, FILM COATED ORAL at 11:39

## 2018-09-01 RX ADMIN — Medication 1 TABLET: at 09:08

## 2018-09-01 NOTE — THERAPY EVALUATION
Acute Care - Speech Language Pathology   Swallow Initial Evaluation [facility]     Patient Name: Ryan Alvarez  : 1950  MRN: [id number]  Today's Date: 2018  Onset of Illness/Injury or Date of Surgery: 18     Referring Physician: Dr. Salinas      Admit Date: 2018  Clinical bedside swallow evaluation complete. Pt was noted to be lethargic with poor level of alertness throughout evaluation. Pt was only able to open his eyes a couple of times for brief periods. With max cues he was able to follow 1 step commands with 50% accuracy. He was presented with an ice chip and pureed consistencies 2x with a delayed cough post ice chip trials. Poor oral manipulation of bolus noted. Pt's ability to participate continued to decline as session progressed until SLP discontinued evaluation. SLP cannot r/o aspiration with PO intake at this time. SLP recommends pt be placed with exception of meds to be administered crushed in pudding/applesauce and ice chips when alert. SLP will continue to follow and treat.  Elia Leigh, MS CCC-SLP 2018 3:08 PM    Visit Dx:     ICD-10-CM ICD-9-CM   1. Subdural hematoma (CMS/HCC) I62.00 432.1   2. ТАТЬЯНА (acute kidney injury) (CMS/HCC) N17.9 584.9   3. Dehydration E86.0 276.51   4. Anemia, unspecified type D64.9 285.9   5. Non-traumatic rhabdomyolysis M62.82 728.88   6. Alcohol abuse F10.10 305.00   7. Fall, initial encounter W19.XXXA E888.9   8. Abrasions of multiple sites T07.XXXA 919.0   9. Elevated troponin R74.8 790.6   10. Impaired mobility and ADLs Z74.09 799.89   11. Generalized weakness R53.1 780.79   12. Oropharyngeal dysphagia R13.12 787.22     Patient Active Problem List   Diagnosis   • Subdural hematoma (CMS/HCC)   • Alcohol abuse   • ТАТЬЯНА (acute kidney injury) (CMS/HCC)   • Umbilical hernia   • Abrasions of multiple sites   • Cirrhosis of liver (CMS/HCC)   • Non-traumatic rhabdomyolysis   • Dehydration   • Elevated troponin   • Anemia     Past Medical History:    Diagnosis Date   • Alcohol abuse    • Ascites    • Cirrhosis of liver (CMS/HCC)    • Hernia of abdominal wall      Past Surgical History:   Procedure Laterality Date   • BACK SURGERY            SWALLOW EVALUATION (last 72 hours)      SLP Adult Swallow Evaluation     Row Name 09/01/18 1415                   Rehab Evaluation    Document Type evaluation  -CS        Subjective Information no complaints  -CS        Patient Observations lethargic  -CS        Patient/Family Observations Family present  -CS        Patient Effort poor  -CS           General Information    Patient Profile Reviewed yes  -CS        Pertinent History Of Current Problem Subdural hematoma. CT of chest w/o contrast- Large right pleural effusion with complete compression of the right.   -CS        Current Method of Nutrition regular textures;thin liquids  -CS        Precautions/Limitations, Vision other (see comments)   Unable to assess  -CS        Precautions/Limitations, Hearing WFL  -CS        Prior Level of Function-Communication WFL  -CS        Prior Level of Function-Swallowing no diet consistency restrictions  -CS        Plans/Goals Discussed with patient;family  -CS        Barriers to Rehab cognitive status  -CS        Patient's Goals for Discharge patient could not state  -CS        Family Goals for Discharge patient able to return to PO diet  -CS           Pain Assessment    Additional Documentation Pain Scale: FACES Pre/Post-Treatment (Group)  -CS           Pain Scale: FACES Pre/Post-Treatment    Pain: FACES Scale, Pretreatment 0-->no hurt  -CS        Pain: FACES Scale, Post-Treatment 0-->no hurt  -CS           Oral Motor and Function    Dentition Assessment natural, present and adequate  -CS        Secretion Management WNL/WFL  -CS        Mucosal Quality moist, healthy  -CS        Volitional Swallow unable to elicit  -CS        Volitional Cough unable to elicit  -CS           Oral Musculature and Cranial Nerve Assessment    Oral Motor  General Assessment generalized oral motor weakness  -CS           General Eating/Swallowing Observations    Positioning During Eating upright in bed  -CS           Clinical Swallow Eval    Oral Prep Phase impaired  -CS        Oral Transit impaired  -CS        Pharyngeal Phase suspected pharyngeal impairment  -CS        Clinical Swallow Evaluation Summary Clinical bedside swallow evaluation complete. Pt was noted to be lethargic with poor level of alertness throughout evaluation. Pt was only able to open his eyes a couple of times for brief periods. With max cues he was able to follow 1 step commands with 50% accuracy. He was presented with an ice chip and pureed consistencies 2x with a delayed cough post ice chip trials. Poor oral manipulation of bolus noted. Pt's ability to participate continued to decline as session progressed until SLP discontinued evaluation. SLP cannot r/o aspiration with PO intake at this time. SLP recommends pt be placed with exception of meds to be administered crushed in pudding/applesauce and ice chips when alert. SLP will continue to follow and treat.  -CS           Oral Prep Concerns    Oral Prep Concerns poor rotary chew;increased prep time  -CS        Poor Rotary Chew pudding  -CS        Increased Prep Time pudding  -CS           Oral Transit Concerns    Oral Transit Concerns increased oral transit time  -CS        Increased Oral Transit Time pudding  -CS           Pharyngeal Phase Concerns    Pharyngeal Phase Concerns cough  -CS        Cough other (see comments)   ice chips  -CS           Clinical Impression    SLP Swallowing Diagnosis mod-severe;oral dysfunction;suspected pharyngeal dysfunction  -CS        Functional Impact risk of aspiration/pneumonia;risk of malnutrition  -CS        Rehab Potential/Prognosis, Swallowing adequate, monitor progress closely  -CS        Swallow Criteria for Skilled Therapeutic Interventions Met demonstrates skilled criteria  -CS            Recommendations    Therapy Frequency (Swallow) at least;3 days per week  -CS        Predicted Duration Therapy Intervention (Days) until discharge  -CS        SLP Diet Recommendation NPO  -CS        Recommended Diagnostics reassess via clinical swallow evaluation  -CS        Recommended Precautions and Strategies upright posture during/after eating  -CS        SLP Rec. for Method of Medication Administration meds crushed;with pudding or applesauce  -CS        Monitor for Signs of Aspiration yes;notify SLP if any concerns  -CS        Anticipated Dischage Disposition unknown  -CS           Swallow Goals (SLP)    Oral Nutrition/Hydration Goal Selection (SLP) oral nutrition/hydration, SLP goal 1  -CS           Oral Nutrition/Hydration Goal 1 (SLP)    Oral Nutrition/Hydration Goal 1, SLP Pt will tolerate LRD w/o any overt s/s of aspiration.  -CS        Time Frame (Oral Nutrition/Hydration Goal 1, SLP) by discharge  -CS        Barriers (Oral Nutrition/Hydration Goal 1, SLP) cognitive status  -CS        Progress/Outcomes (Oral Nutrition/Hydration Goal 1, SLP) goal ongoing  -CS          User Key  (r) = Recorded By, (t) = Taken By, (c) = Cosigned By    Initials Name Effective Dates    Elia Prado, MS CCC-SLP 04/03/18 -         EDUCATION  The patient has been educated in the following areas:   Dysphagia (Swallowing Impairment).    SLP Recommendation and Plan  SLP Swallowing Diagnosis: mod-severe, oral dysfunction, suspected pharyngeal dysfunction  SLP Diet Recommendation: NPO  Recommended Precautions and Strategies: upright posture during/after eating     Monitor for Signs of Aspiration: yes, notify SLP if any concerns  Recommended Diagnostics: reassess via clinical swallow evaluation  Swallow Criteria for Skilled Therapeutic Interventions Met: demonstrates skilled criteria  Anticipated Dischage Disposition: unknown  Rehab Potential/Prognosis, Swallowing: adequate, monitor progress closely  Therapy Frequency (Swallow):  at least, 3 days per week  Predicted Duration Therapy Intervention (Days): until discharge       Plan of Care Reviewed With: patient  Plan of Care Review  Plan of Care Reviewed With: patient  Progress: no change (Initial eval)  Outcome Summary: Clinical bedside swallow evaluation complete. Pt was noted to be lethargic with poor level of alertness throughout evaluation. Pt was only able to open his eyes a couple of times for brief periods. With max cues he was able to follow 1 step commands with 50% accuracy. He was presented with an ice chip and pureed consistencies 2x with a delayed cough post ice chip trials. Poor oral manipulation of bolus noted. Pt's ability to participate continued to decline as session progressed until SLP discontinued evaluation. SLP cannot r/o aspiration with PO intake at this time. SLP recommends pt be placed with exception of meds to be administered crushed in pudding/applesauce and ice chips when alert. SLP will continue to follow and treat.          SLP GOALS     Row Name 09/01/18 1415             Oral Nutrition/Hydration Goal 1 (SLP)    Oral Nutrition/Hydration Goal 1, SLP Pt will tolerate LRD w/o any overt s/s of aspiration.  -CS      Time Frame (Oral Nutrition/Hydration Goal 1, SLP) by discharge  -CS      Barriers (Oral Nutrition/Hydration Goal 1, SLP) cognitive status  -CS      Progress/Outcomes (Oral Nutrition/Hydration Goal 1, SLP) goal ongoing  -CS        User Key  (r) = Recorded By, (t) = Taken By, (c) = Cosigned By    Initials Name Provider Type    CS Elia Leigh MS CCC-SLP Speech and Language Pathologist             SLP Outcome Measures (last 72 hours)      SLP Outcome Measures     Row Name 09/01/18 1415             SLP Outcome Measures    Outcome Measure Used? Adult NOMS  -CS         Adult FCM Scores    FCM Chosen Swallowing  -CS      Swallowing FCM Score 2  -CS        User Key  (r) = Recorded By, (t) = Taken By, (c) = Cosigned By    Initials Name Effective Dates    CS  Elia Leigh MS CCC-SLP 04/03/18 -            Time Calculation:         Time Calculation- SLP     Row Name 09/01/18 1507             Time Calculation- SLP    SLP Start Time 1415  -CS      SLP Stop Time 1508  -      SLP Time Calculation (min) 53 min  -CS      SLP Received On 09/01/18  -      SLP Goal Re-Cert Due Date 09/11/18  -        User Key  (r) = Recorded By, (t) = Taken By, (c) = Cosigned By    Initials Name Provider Type     Elia Leigh MS CCC-SLP Speech and Language Pathologist          Therapy Charges for Today     Code Description Service Date Service Provider Modifiers Qty    34336138465 HC ST SWALLOWING CURRENT STATUS 9/1/2018 Elia Leigh MS CCC-SLP GN, CM 1    28576125890 HC ST SWALLOWING PROJECTED 9/1/2018 Elia Leigh MS CCC-SLP GN, CJ 1    99052082475 HC ST EVAL ORAL PHARYNG SWALLOW 4 9/1/2018 Elia Leigh MS CCC-SLP GN, KX 1          SLP G-Codes  SLP NOMS Used?: Yes  Functional Limitations: Swallowing  Swallow Current Status (): At least 80 percent but less than 100 percent impaired, limited or restricted  Swallow Goal Status (): At least 20 percent but less than 40 percent impaired, limited or restricted    Elia Leigh MS CCC-ANETTE  9/1/2018

## 2018-09-01 NOTE — PROGRESS NOTES
Ryan Alvarez  68 y.o.        Subjective  No events    Temp:  [96.8 °F (36 °C)-98 °F (36.7 °C)] 98 °F (36.7 °C)  Heart Rate:  [53-78] 64  Resp:  [16-24] 18  BP: ()/(48-68) 112/60      Objective    Neurologic Exam    NAD  Awakens easily  Oriented x 3  MAEW    Principal Problem:    Subdural hematoma (CMS/HCC)  Active Problems:    Alcohol abuse    ТАТЬЯНА (acute kidney injury) (CMS/HCC)    Umbilical hernia    Abrasions of multiple sites    Cirrhosis of liver (CMS/HCC)    Non-traumatic rhabdomyolysis    Dehydration    Elevated troponin    Anemia      Lab Results (last 24 hours)     Procedure Component Value Units Date/Time    Body Fluid Culture - Body Fluid, Pleural Cavity [[id number]]  (Normal) Collected:  08/30/18 1342    Specimen:  Body Fluid from Pleural Cavity Updated:  09/01/18 0854     BF Culture No growth at 2 days     Gram Stain Result Moderate (3+) WBCs seen      No organisms seen    Protein, Body Fluid - Pleural Fluid, Pleural Cavity [[id number]] Collected:  08/30/18 1341    Specimen:  Pleural Fluid from Pleural Cavity Updated:  09/01/18 0722     Protein, Fluid 1.6 g/dL      Comment:  ________________________________________________________  :  Peritoneal  :       Pleural          :   Synovial     :  :______________:________________________:________________:  :              : Transudate :  Exudate  :                :  :______________:____________:___________:________________:  :  Not Estab.  :   <3 g/dL  :  >3 g/dL  :    <2.5 g/dL   :  :______________:____________:___________:________________:   The method performance specifications have not been   established for this test in body fluid. The test result   should be integrated into the clinical context for   interpretation.  The method performance specifications have not been established for  this test in body fluid.  The test result should be integrated into  the clinical context for interpretation.       Narrative:       Performed at:   - [facility]  [address]  [id number]  : Artemio Pappas PhD, Phone:  [phone]    Ammonia [[id number]]  (Normal) Collected:  09/01/18 0504    Specimen:  Blood Updated:  09/01/18 0559     Ammonia 32 umol/L     Comprehensive Metabolic Panel [[id number]]  (Abnormal) Collected:  09/01/18 0504    Specimen:  Blood Updated:  09/01/18 0535     Glucose 71 mg/dL      BUN 31 (H) mg/dL      Creatinine 1.55 (H) mg/dL      Sodium 143 mmol/L      Potassium 4.6 mmol/L      Chloride 113 (H) mmol/L      CO2 22.0 (L) mmol/L      Calcium 8.7 mg/dL      Total Protein 5.5 (L) g/dL      Albumin 2.40 (L) g/dL      ALT (SGPT) 47 U/L      AST (SGOT) 42 U/L      Alkaline Phosphatase 77 U/L      Total Bilirubin 0.7 mg/dL      eGFR Non African Amer 45 (L) mL/min/1.73      Globulin 3.1 gm/dL      A/G Ratio 0.8 (L) g/dL      BUN/Creatinine Ratio 20.0     Anion Gap 8.0 mmol/L     CBC (No Diff) [[id number]]  (Abnormal) Collected:  09/01/18 0504    Specimen:  Blood Updated:  09/01/18 0520     WBC 5.30 10*3/mm3      RBC 3.23 (L) 10*6/mm3      Hemoglobin 9.1 (L) g/dL      Hematocrit 28.6 (L) %      MCV 88.5 fL      MCH 28.2 pg      MCHC 31.8 (L) g/dL      RDW 17.8 (H) %      RDW-SD 54.5 (H) fl      MPV 9.3 fL      Platelets 151 10*3/mm3     AFB Culture - Body Fluid, Pleural Cavity [[id number]] Collected:  08/30/18 1342    Specimen:  Body Fluid from Pleural Cavity Updated:  08/31/18 1133     AFB Stain No acid fast bacilli seen on direct smear      No acid fast bacilli seen on concentrated smear    Anaerobic Culture - Pleural Fluid, Pleural Cavity [[id number]]  (Normal) Collected:  08/30/18 1342    Specimen:  Pleural Fluid from Pleural Cavity Updated:  08/31/18 1115     Culture No anaerobes isolated at 24 hours        Ct Head Without Contrast    Result Date: 8/31/2018  EXAM: CT OF THE HEAD WITHOUT IV CONTRAST 8/31/2018.  COMPARISON: Head CT dated 8/29/2018  INDICATION: Male, 68 years-old. Subdural hemorrhage  PROCEDURE: Non contrast  enhanced head CT was performed.  Radiation dose equals  mGy-cm.  Automated exposure control dose reduction technique was implemented.  FINDINGS:  Since most recent head CT dated 8/29/2018, there has been no significant interval change in the right near hemispheric subdural hematoma. Midline shift to the left is unchanged, approximately 3 mm. Right frontal cavernous malformation unchanged. No new focus of intracranial hemorrhage. No acute hydrocephalus.      No significant interval changes. This report was finalized on 08/31/2018 13:28 by Dr. Stefani Dunaway MD.          Plan:     68-year-old male with history of alcohol abuse and liver cirrhosis found down after 3 days     1) Subdural hematoma - wax/wane this AM, but currently stable with serial neurologic exams and repeat imaging.  If subdural hematoma enlarges and causes neurologic deficit, discussed with patient and he wishes to proceed with surgery.  At this time, hopefully the subdural hematoma will improve without surgical intervention given liver cirrhosis, acute kidney injury, rhabdomyolysis.  Patient prefers to avoid surgery at this time.     2) Right frontal lobe calcification with intraparenchymal hemorrhage - MRI shows cavernous malformation     3)  Coagulapathy likely secondary to liver cirrhosis, INR improved with FFP         Alireza Salinas MD

## 2018-09-01 NOTE — PLAN OF CARE
Problem: Fall Risk (Adult)  Goal: Absence of Fall  Outcome: Ongoing (interventions implemented as appropriate)      Problem: Skin Injury Risk (Adult)  Goal: Skin Health and Integrity  Outcome: Ongoing (interventions implemented as appropriate)      Problem: Patient Care Overview  Goal: Plan of Care Review  Outcome: Ongoing (interventions implemented as appropriate)   09/01/18 0302   Coping/Psychosocial   Plan of Care Reviewed With patient;sibling   Plan of Care Review   Progress no change   OTHER   Outcome Summary Pt c/o pain w/ some relief from PRN tylenol. Pt is more confused and drowsy. Pt responds to voice. I&O cath continued for retention. More wounds found and pictures made. Turn Q2hrs. Wheezing continues. O2 2L maintained. VSS. Safety maintained.       Problem: Alcohol Withdrawal Acute, Risk/Actual (Adult)  Goal: Signs and Symptoms of Listed Potential Problems Will be Absent, Minimized or Managed (Alcohol Withdrawal Acute, Risk/Actual)  Outcome: Ongoing (interventions implemented as appropriate)      Problem: Nutrition, Imbalanced: Inadequate Oral Intake (Adult)  Goal: Improved Oral Intake  Outcome: Ongoing (interventions implemented as appropriate)      Problem: Brain Injury, Moderate Traumatic (GCS 9-12) (Adult)  Goal: Signs and Symptoms of Listed Potential Problems Will be Absent, Minimized or Managed (Brain Injury, Moderate Traumatic)  Outcome: Ongoing (interventions implemented as appropriate)

## 2018-09-01 NOTE — PLAN OF CARE
Problem: Patient Care Overview  Goal: Plan of Care Review  Outcome: Ongoing (interventions implemented as appropriate)   09/01/18 1504   Coping/Psychosocial   Plan of Care Reviewed With patient   Plan of Care Review   Progress no change  (Initial eval)   OTHER   Outcome Summary Clinical bedside swallow evaluation complete. Pt was noted to be lethargic with poor level of alertness throughout evaluation. Pt was only able to open his eyes a couple of times for brief periods. With max cues he was able to follow 1 step commands with 50% accuracy. He was presented with an ice chip and pureed consistencies 2x with a delayed cough post ice chip trials. Poor oral manipulation of bolus noted. Pt's ability to participate continued to decline as session progressed until SLP discontinued evaluation. SLP cannot r/o aspiration with PO intake at this time. SLP recommends pt be placed with exception of meds to be administered crushed in pudding/applesauce and ice chips when alert. SLP will continue to follow and treat.

## 2018-09-01 NOTE — PROGRESS NOTES
Nephrology ([facility]) Progress Note      Patient:  Ryan Alvarez  YOB: 1950  Date of Service: 9/1/2018  MRN: [id number]   Acct: [id number]   Primary Care Physician: Rufino Stevens APRN  Advance Directive:   Code Status and Medical Interventions:   Ordered at: 08/26/18 0454     Level Of Support Discussed With:    Patient     Code Status:    CPR     Medical Interventions (Level of Support Prior to Arrest):    Full     Admit Date: 8/25/2018       Hospital Day: 6  Referring Provider: Tayo Almonte MD      Patient personally seen and examined.  Complete chart including Consults, Notes, Operative Reports, Labs, Cardiology, and Radiology studies reviewed as able.        Subjective:  Ryan Alvarez is a 68 y.o. male  whom we were consulted for acute kidney injury.  No prior nephrological contacts; unknown renal baseline.  History of ETOH abuse, cirrhosis.  Presented to [facility] after a fall at home with an unknown down time.  He remembers falling outside and crawling back into house; was found some time later by family member.  Diagnosed with ТАТЬЯНА and subdural hematoma.  Transferred to [facility] for higher level of care.  Required pressors briefly after arrival for blood pressure support; these are now discontinued and pt was moved to the floor. Patient has refused catheter.       More confused today.  No n/v/d/cp.  Pedro placed for decreased UOP. Minimal po intake today.      Allergies:  Patient has no known allergies.    Home Meds:  Prescriptions Prior to Admission   Medication Sig Dispense Refill Last Dose   • furosemide (LASIX) 40 MG tablet Take 40 mg by mouth Daily.      • hydrOXYzine (ATARAX) 10 MG tablet Take 10 mg by mouth 4 (Four) Times a Day.      • omeprazole (priLOSEC) 20 MG capsule Take 20 mg by mouth Daily.      • propranolol (INDERAL) 10 MG tablet Take 10 mg by mouth 2 (Two) Times a Day.      • spironolactone (ALDACTONE) 100 MG tablet  Take 100 mg by mouth 2 (Two) Times a Day.          Medicines:  Current Facility-Administered Medications   Medication Dose Route Frequency Provider Last Rate Last Dose   • acetaminophen (TYLENOL) tablet 650 mg  650 mg Oral Q4H PRN Tayo Almonte MD   650 mg at 09/01/18 1139    Or   • acetaminophen (TYLENOL) suppository 650 mg  650 mg Rectal Q4H PRN Tayo Almonte MD       • acetaminophen (TYLENOL) tablet 650 mg  650 mg Oral Q6H PRN Tayo Almonte MD   650 mg at 08/28/18 2128   • cefTRIAXone (ROCEPHIN) 1 g/10mL IV PUSH syringe  1 g Intravenous Q24H Bennett Manzo DO   1 g at 08/31/18 1748   • flintstones complete (FLINTSTONES) chewable tablet 1 tablet  1 tablet Oral Daily Bennett Manzo DO   1 tablet at 09/01/18 0908   • furosemide (LASIX) tablet 20 mg  20 mg Oral Daily Sina Casas APRN   20 mg at 09/01/18 0908   • guaiFENesin (MUCINEX) 12 hr tablet 1,200 mg  1,200 mg Oral Q12H Bennett Manzo DO   1,200 mg at 09/01/18 0908   • Hold medication  1 each Does not apply Continuous PRN Bennett Manzo DO       • hydrOXYzine (ATARAX) tablet 10 mg  10 mg Oral 4x Daily Bennett Manzo DO   10 mg at 09/01/18 1150   • ipratropium-albuterol (DUO-NEB) nebulizer solution 3 mL  3 mL Nebulization Q4H PRN Bennett Manzo DO   3 mL at 08/31/18 1942   • lactulose solution 30 g  30 g Oral BID Bennett Manzo DO   30 g at 09/01/18 1149   • ondansetron (ZOFRAN) tablet 4 mg  4 mg Oral Q6H PRN Tayo Almonte MD       • pantoprazole (PROTONIX) EC tablet 40 mg  40 mg Oral Q AM Bennett Manzo DO   40 mg at 09/01/18 1148   • propranolol (INDERAL) tablet 10 mg  10 mg Oral Q12H Bennett Manzo DO   10 mg at 09/01/18 0908   • sodium bicarbonate tablet 650 mg  650 mg Oral BID Sina Casas, APRN   650 mg at 09/01/18 0908   • sodium chloride 0.9 % flush 1-10 mL  1-10 mL Intravenous PRN Tayo Almonte MD           Past Medical History:  Past Medical History:   Diagnosis Date   • Alcohol abuse    •  "Ascites    • Cirrhosis of liver (CMS/HCC)    • Hernia of abdominal wall        Past Surgical History:  Past Surgical History:   Procedure Laterality Date   • BACK SURGERY         Family History  History reviewed. No pertinent family history.    Social History  Social History     Social History   • Marital status:      Spouse name: N/A   • Number of children: N/A   • Years of education: N/A     Occupational History   • Not on file.     Social History Main Topics   • Smoking status: Never Smoker   • Smokeless tobacco: Never Used   • Alcohol use Yes      Comment: at least 6 pack/daily   • Drug use: No   • Sexual activity: Not on file     Other Topics Concern   • Not on file     Social History Narrative   • No narrative on file         Review of Systems:  History obtained from chart review and the patient  General ROS: No fever or chills  Respiratory ROS: No cough, shortness of breath, wheezing  Cardiovascular ROS: No chest pain or palpitations  Gastrointestinal ROS: No abdominal pain or melena  Genito-Urinary ROS: No dysuria or hematuria    Objective:  /58 (BP Location: Left arm, Patient Position: Sitting)   Pulse 57   Temp 98.9 °F (37.2 °C) (Tympanic)   Resp 18   Ht 188 cm (74\")   Wt 94.9 kg (209 lb 4.8 oz)   SpO2 98%   BMI 26.87 kg/m²     Intake/Output Summary (Last 24 hours) at 09/01/18 1632  Last data filed at 09/01/18 1345   Gross per 24 hour   Intake               10 ml   Output              350 ml   Net             -340 ml     General: awake/alert   Chest:  clear to auscultation bilaterally without respiratory distress  CVS: regular rate and rhythm  Abdominal: soft/mild distention/umbilical hernia  Extremities: tr ble edema  Skin: warm and dry without rash      Labs:    Results from last 7 days  Lab Units 09/01/18  0504 08/31/18  0642 08/30/18  0657 08/29/18  0623   WBC 10*3/mm3 5.30  --  5.05 4.99   HEMOGLOBIN g/dL 9.1* 8.9* 8.7* 8.9*   HEMATOCRIT % 28.6* 27.5* 27.2* 27.5*   PLATELETS " 10*3/mm3 151  --  144 168           Results from last 7 days  Lab Units 09/01/18  0504 08/31/18  0642 08/30/18  0657   SODIUM mmol/L 143 142 143   POTASSIUM mmol/L 4.6 4.8 4.1   CHLORIDE mmol/L 113* 113* 112*   CO2 mmol/L 22.0* 24.0 23.0*   BUN mg/dL 31* 29* 28*   CREATININE mg/dL 1.55* 1.32 1.49*   CALCIUM mg/dL 8.7 8.5 8.1*   BILIRUBIN mg/dL 0.7 1.0 0.7   ALK PHOS U/L 77 74 87   ALT (SGPT) U/L 47 37 37   AST (SGOT) U/L 42 51* 41   GLUCOSE mg/dL 71 72 117*       Radiology:   Imaging Results (last 72 hours)     Procedure Component Value Units Date/Time    CT Head Without Contrast [[id number]] Collected:  08/31/18 1321     Updated:  08/31/18 1332    Narrative:       EXAM: CT OF THE HEAD WITHOUT IV CONTRAST 8/31/2018.     COMPARISON: Head CT dated 8/29/2018      INDICATION: Male, 68 years-old. Subdural hemorrhage      PROCEDURE: Non contrast enhanced head CT was performed.      Radiation dose equals  mGy-cm.  Automated exposure control dose  reduction technique was implemented.     FINDINGS:      Since most recent head CT dated 8/29/2018, there has been no significant  interval change in the right near hemispheric subdural hematoma. Midline  shift to the left is unchanged, approximately 3 mm. Right frontal  cavernous malformation unchanged. No new focus of intracranial  hemorrhage. No acute hydrocephalus.       Impression:       No significant interval changes.  This report was finalized on 08/31/2018 13:28 by Dr. Stefani Dunaway MD.     Thoracentesis [[id number]] Collected:  08/30/18 1413    Specimen:  Body Fluid Updated:  08/31/18 1151    Narrative:       DATE OF PROCEDURE:  8/30/2018.     PREPROCEDURE DIAGNOSIS:  Right pleural effusion.  POSTPROCEDURE DIAGNOSIS:  Right pleural effusion.          INDICATIONS FOR PROCEDURE: Dyspnea     PROCEDURE:   1. Thoracentesis, diagnostic and therapeutic.  2. Ultrasound guidance for thoracentesis, imaging supervision and  interpretation.     ANESTHESIA: 1% lidocaine,  injected locally.          COMPLICATIONS: None.        EXAMINATIONS FOR COMPARISON:  CT chest dated 8/29/2018.     DESCRIPTION OF PROCEDURE:   The risk and benefits of the procedure were explained to the patient.   Specifically, the risks of bleeding, infection, pneumothorax (possible  thoracostomy tube), and damage to surrounding structures were discussed  extensively. The patient's questions were answered. The patient opted to  proceed. Written and verbal consent were obtained from the patient.     TIME OUT was taken and the patient's name, medical record number, date  of birth, procedure, entry site, and listen allergies were confirmed.  The site of the procedure was confirmed and marked.      The rightposterior thorax was prepped and draped in sterile fashion. The  area was anesthetized with buffered lidocaine 2%.      A 5 [place] 10 cm  catheter was inserted into the pleural effusion  using  ultrasound guidance. Approximately 1500 mL of tea colored fluid was  recovered and sent to the pathology lab for analysis.      The patient tolerated the procedure well.   No immediate complications  were noted.       Impression:       Successful ultrasound guided rightthoracentesis.. No immediate  complications were noted.              This report was finalized on 08/30/2018 14:34 by Dr. Stefani Dunaway MD.     Chest [[id number]] Collected:  08/30/18 1413     Updated:  08/31/18 1151    Narrative:       DATE OF PROCEDURE:  8/30/2018.     PREPROCEDURE DIAGNOSIS:  Right pleural effusion.  POSTPROCEDURE DIAGNOSIS:  Right pleural effusion.          INDICATIONS FOR PROCEDURE: Dyspnea     PROCEDURE:   1. Thoracentesis, diagnostic and therapeutic.  2. Ultrasound guidance for thoracentesis, imaging supervision and  interpretation.     ANESTHESIA: 1% lidocaine, injected locally.          COMPLICATIONS: None.        EXAMINATIONS FOR COMPARISON:  CT chest dated 8/29/2018.     DESCRIPTION OF PROCEDURE:   The risk and benefits of the  procedure were explained to the patient.   Specifically, the risks of bleeding, infection, pneumothorax (possible  thoracostomy tube), and damage to surrounding structures were discussed  extensively. The patient's questions were answered. The patient opted to  proceed. Written and verbal consent were obtained from the patient.     TIME OUT was taken and the patient's name, medical record number, date  of birth, procedure, entry site, and listen allergies were confirmed.  The site of the procedure was confirmed and marked.      The rightposterior thorax was prepped and draped in sterile fashion. The  area was anesthetized with buffered lidocaine 2%.      A 5 [place]ra[place] 10 cm  catheter was inserted into the pleural effusion  using  ultrasound guidance. Approximately 1500 mL of tea colored fluid was  recovered and sent to the pathology lab for analysis.      The patient tolerated the procedure well.   No immediate complications  were noted.       Impression:       Successful ultrasound guided rightthoracentesis.. No immediate  complications were noted.              This report was finalized on 08/30/2018 14:34 by Dr. Stefani Dunaway MD.    MRI Brain With & Without Contrast [[id number]] Collected:  08/30/18 1458     Updated:  08/30/18 1518    Narrative:       EXAM: MR BRAIN WITHOUT AND WITH IV CONTRAST 8/30/2018     COMPARISON: Head CT dated 8/29/2018      HISTORY: 68 years-old Male. Right frontal calcification;  I62.00-Nontraumatic subdural hemorrhage, unspecified; N17.9-Acute kidney  failure, unspecified; E86.0-Dehydration; D64.9-Anemia, unspecified;  M62.82-Rhabdomyolysis; F10.10-Alcohol abuse, uncomplicated;  W19.XXXA-Unspecified fall, initial encounter; T07.XXXA-Unspecified  multiple injuries, initial encounter; R74.8-Abnormal levels of other  serum enzymes; Z74.09-Other reduce     TECHNIQUE:   Routine pulse sequences were obtained of the brain before and after the  administration of IV contrast.      REPORT:       There is a near right hemispheric subdural hematoma, with maximum  thickness of 1.5 cm. 3 mm right to left midline shift. There is no  evidence of intraventricular hemorrhage.     The previously seen hyperdensity in head CT centered in the right  superior frontal gyrus corresponds with a cavernous malformation  measuring 2 cm. There is no associated T2/FLAIR abnormal signal to  suggest recent hemorrhage.        There is no diffusion restriction within the brain parenchyma to suggest  acute infarct. No acute hydrocephalus.       Impression:       1.  known-right right near hemispheric subdural hematoma.  2.  Right frontal cavernous malformation.  This report was finalized on 08/30/2018 15:15 by Dr. Stefani Dunaway MD.    XR Chest AP [[id number]] Collected:  08/30/18 1414     Updated:  08/30/18 1417    Narrative:       XR CHEST AP- 8/30/2018 2:06 PM CDT     HISTORY: Post thoracentesis of the RIGHT pleural effusion     COMPARISON: 8/29/2018.     FINDINGS:   The RIGHT pleural effusion has significantly improved. There is no  evidence of pneumothorax. The LEFT lung is stable. The cardiomediastinal  silhouette and pulmonary vascularity are unchanged.      The osseous structures and surrounding soft tissues demonstrate no acute  abnormality.       Impression:       1. No pneumothorax after RIGHT thoracentesis.        This report was finalized on 08/30/2018 14:14 by Dr. Brenton Horne MD.          Culture:         Assessment   ТАТЬЯНА  Rhabdomyolysis  Fall with SDH  Alcoholic cirrhosis  Anemia  Metabolic acidosis     Plan:  Small IVF bolus given mild hypotension/limited intake  PRBC prn  Bicarb  Start diuretics  D/w RN/family    Vlad Chaudhry MD  9/1/2018  4:32 PM

## 2018-09-01 NOTE — PLAN OF CARE
Problem: Patient Care Overview  Goal: Plan of Care Review  Outcome: Ongoing (interventions implemented as appropriate)   09/01/18 0940   Coping/Psychosocial   Plan of Care Reviewed With patient   Plan of Care Review   Progress improving   OTHER   Outcome Summary Pt fowlers in bed attempting to eat breakfast. Pt not able to feed himself, notified nursing. Pt able to state his name and location; however not oriented to time and date. Pt required min assist to get EOB and mod assist to scoot EOB. Pt required mod x2 to stand and take steps to chair. Pt required constant cueing to continue to take steps toward chair. Pt unable to control descend into chair and required min assist x2 for safety. Pt will continue to benefit from skilled PT to improve strength, balance, safety awareness, and overall mobility.

## 2018-09-01 NOTE — PROGRESS NOTES
[facility] Medicine Services  INPATIENT PROGRESS NOTE    Patient Name: Ryan Alvarez  Date of Admission: 8/25/2018  Today's Date: 09/01/18  Length of Stay: 6  Primary Care Physician: Rufino Stevens APRN    Subjective   Chief Complaint: weakness, encephalopathy  HPI   His mental status improved significantly after the lactulose enema yesterday.    I discussed the case with Dr. Salinas yesterday afternoon after the patient got a repeat CT.  The CT looks stable area and no surgical intervention planned at this point, Dr. Salinas.  Continue to follow clinically.    He has no complaints this morning.  He needs somebody to help him eat.  His sister brought him a can of [place] (on the bedside table), he states that he is not using it.    Review of Systems   All pertinent negatives and positives are as above. All other systems have been reviewed and are negative unless otherwise stated.     Objective    Temp:  [96.8 °F (36 °C)-98 °F (36.7 °C)] 98 °F (36.7 °C)  Heart Rate:  [53-78] 64  Resp:  [16-24] 18  BP: ()/(48-68) 112/60  Physical Exam  Constitutional: Up in the chair. Getting assistance from one of the aids with breakfast. No family currently present. Discussed with his nurse, Mendel.   Head: Normocephalic and atraumatic.   Eyes: Pupils are equal, round, and reactive to light. Conjunctivae and EOM are normal.   Neck: Neck supple. No JVD present.   Cardiovascular: Normal rate, regular rhythm, normal heart sounds and intact distal pulses.  Exam reveals no gallop and no friction rub. No murmur heard.  Pulmonary/Chest: Effort normal. No respiratory distress. Diminished breath sounds in the right lung base. Remains on room air.    Abdominal: Soft. Bowel sounds are normal. He exhibits distension (softer today). There is no tenderness. There is no rebound and no guarding. A hernia (reducible umbilical ) is present.   Musculoskeletal: Normal range of motion. He exhibits no edema,  tenderness or deformity.   Neurological: Awake, alert. Oriented to person and place. He displays normal reflexes. No cranial nerve deficit. He exhibits normal muscle tone. No tremor.  Generalized weakness, nonfocal.   Skin: Skin is warm and dry. No rash noted. Widespread abrasions and bruises.    Psychiatric: He has a normal mood and affect. His behavior is normal. Judgment and thought content normal.    Results Review:  I have reviewed the labs, radiology results, and diagnostic studies.    Laboratory Data:     Results from last 7 days  Lab Units 09/01/18  0504 08/31/18  0642 08/30/18  0657 08/29/18  0623   WBC 10*3/mm3 5.30  --  5.05 4.99   HEMOGLOBIN g/dL 9.1* 8.9* 8.7* 8.9*   HEMATOCRIT % 28.6* 27.5* 27.2* 27.5*   PLATELETS 10*3/mm3 151  --  144 168       Results from last 7 days  Lab Units 09/01/18  0504 08/31/18  0642 08/30/18  0657   SODIUM mmol/L 143 142 143   POTASSIUM mmol/L 4.6 4.8 4.1   CHLORIDE mmol/L 113* 113* 112*   CO2 mmol/L 22.0* 24.0 23.0*   BUN mg/dL 31* 29* 28*   CREATININE mg/dL 1.55* 1.32 1.49*   CALCIUM mg/dL 8.7 8.5 8.1*   BILIRUBIN mg/dL 0.7 1.0 0.7   ALK PHOS U/L 77 74 87   ALT (SGPT) U/L 47 37 37   AST (SGOT) U/L 42 51* 41   GLUCOSE mg/dL 71 72 117*     Radioloy Data:   CT Head Without Contrast [[id number]] Sergo as Reviewed   Order Status: Completed Collected: 08/31/18 1321    Updated: 08/31/18 1332   Narrative:     EXAM: CT OF THE HEAD WITHOUT IV CONTRAST 8/31/2018.     COMPARISON: Head CT dated 8/29/2018      INDICATION: Male, 68 years-old. Subdural hemorrhage      PROCEDURE: Non contrast enhanced head CT was performed.      Radiation dose equals  mGy-cm.  Automated exposure control dose  reduction technique was implemented.     FINDINGS:      Since most recent head CT dated 8/29/2018, there has been no significant  interval change in the right near hemispheric subdural hematoma. Midline  shift to the left is unchanged, approximately 3 mm. Right frontal  cavernous malformation  unchanged. No new focus of intracranial  hemorrhage. No acute hydrocephalus.      Impression:     No significant interval changes.  This report was finalized on 08/31/2018 13:28 by Dr. Stefani Dunaway MD.     Pre thoracentesis:         Post thoracentesis:       I have reviewed the patient's current medications.     Assessment/Plan   Assessment:   1.  Fall.  2.  Traumatic right frontoparietal subdural hematoma.  3.  Encephalopathy, multifactorial (PSE, SDH).   4.  Acute renal failure with concern for rhabdomyolysis, improving.  5.  Volume depletion, improved.  6.  Alcohol abuse.  7.  Alcoholic cirrhosis of the liver with ascites.  8.  Elevated troponin.   9.  Normocytic anemia, no signs of acute blood loss, iron deficient; occult negative.     10.  Slight coagulopathy, related to liver disease.  11.  Opacification of the right lung; likely hepatic hydrothorax, status post thoracentesis.     Plan:   MRI done on 8/30 suggested progression of his subdural hematoma stating near hemispheric with shift.  Prior CTs describe this as being mainly frontal. Dr. Salinas was contacted by nursing regarding this and repeated a CT, which was stable. No surgery planned for now.       CT scan of the chest without contrast on 8/29 showed what is likely a hepatic hydrothorax. Thoracentesis on 8/30 yielded 1.5 liters. Transudative, consistent with hepatic hydrothorax, likely to recur.  Microbiology data from this fluid is negative so far.  Incentive spirometry, mucolytics, and bronchodilators for now. Some patchy WILI infiltrate on CT as well, but no leukocytosis or febrile episodes to suggest an infectious process. He also remains on room air.     Renal function has stalled around 1.5 (baseline?). Nephrology is following as well. Stopped IV fluids on 8/30. Stopped checking CPK as it had normalized. Renal ultrasound was unremarkable. Nephrology has restarted a small dose of Lasix as of 8/31. He needs to be on aldactone at some point.  He was  recently due for an outpatient paracentesis with Dr. Suarez. He may need one prior to discharge, but his abdomen does not seem as distended as recent days. Hold for now.      Status post 2 units packed red blood cells on 8/27.  Hemoglobin stable since. IV Venofer.  No signs of acute bleeding.  Negative fecal occult test.      Stopped scheduled Valium on 8/31. Status post IV thiamine.     Ammonia 16-32 (normal), but that does not necessarily disqualify portosystemic encephalopathy.  Lactulose enema given on 8/31. Daily oral lactulose.         Physical and occupational therapy.     SCDs for DVT prophylaxis.     Discharge Planning: [facility] when ready to do so.    Bennett Manzo,    09/01/18   9:30 AM

## 2018-09-01 NOTE — THERAPY TREATMENT NOTE
Acute Care - Physical Therapy Treatment Note  [facility]     Patient Name: Ryan Alvarez  : 1950  MRN: [id number]  Today's Date: 2018  Onset of Illness/Injury or Date of Surgery: 18  Date of Referral to PT: 18  Referring Physician: Dr. Salinas    Admit Date: 2018    Visit Dx:    ICD-10-CM ICD-9-CM   1. Subdural hematoma (CMS/HCC) I62.00 432.1   2. ТАТЬЯНА (acute kidney injury) (CMS/HCC) N17.9 584.9   3. Dehydration E86.0 276.51   4. Anemia, unspecified type D64.9 285.9   5. Non-traumatic rhabdomyolysis M62.82 728.88   6. Alcohol abuse F10.10 305.00   7. Fall, initial encounter W19.XXXA E888.9   8. Abrasions of multiple sites T07.XXXA 919.0   9. Elevated troponin R74.8 790.6   10. Impaired mobility and ADLs Z74.09 799.89   11. Generalized weakness R53.1 780.79     Patient Active Problem List   Diagnosis   • Subdural hematoma (CMS/HCC)   • Alcohol abuse   • ТАТЬЯНА (acute kidney injury) (CMS/HCC)   • Umbilical hernia   • Abrasions of multiple sites   • Cirrhosis of liver (CMS/HCC)   • Non-traumatic rhabdomyolysis   • Dehydration   • Elevated troponin   • Anemia       Therapy Treatment          Rehabilitation Treatment Summary     Row Name 18             Treatment Time/Intention    Discipline physical therapy assistant  -TR      Document Type therapy note (daily note)  -TR      Subjective Information complains of;pain  -TR      Mode of Treatment physical therapy  -TR      Patient Effort adequate  -TR      Existing Precautions/Restrictions fall  -TR      Equipment Issued to Patient walker, front wheeled  -TR      Recorded by [TR] Aysha Tineo, PTA 18 0938      Row Name 18 0915             Cognitive Assessment/Intervention- PT/OT    Affect/Mental Status (Cognitive) anxious  -TR      Behavioral Issues (Cognitive) other (see comments)   easily distracted   -TR      Orientation Status (Cognition) oriented x 4  -TR      Follows Commands (Cognition) follows one step  commands;delayed response/completion;increased processing time needed  -TR      Safety Deficit (Cognitive) impulsivity  -TR      Personal Safety Interventions elopement precautions initiated;gait belt;nonskid shoes/slippers when out of bed  -TR      Recorded by [TR] Aysha Tineo, [facility] 09/01/18 0938      Row Name 09/01/18 0915             Bed Mobility Assessment/Treatment    Scooting/Bridging [place] (Bed Mobility) verbal cues;moderate assist (50% patient effort)  -TR      Supine-Sit [place] (Bed Mobility) verbal cues;minimum assist (75% patient effort);moderate assist (50% patient effort)  -TR      Bed Mobility, Safety Issues decreased use of arms for pushing/pulling;decreased use of legs for bridging/pushing;cognitive deficits limit understanding  -TR      Assistive Device (Bed Mobility) bed rails;draw sheet  -TR      Recorded by [TR] Aysha Tineo, PTA 09/01/18 0938      Row Name 09/01/18 0915             Sit-Stand Transfer    Sit-Stand [place] (Transfers) verbal cues;moderate assist (50% patient effort);2 person assist  -TR      Assistive Device (Sit-Stand Transfers) walker, 4-wheeled  -TR      Recorded by [TR] Aysha Tineo, PTA 09/01/18 0938      Row Name 09/01/18 0915             Stand-Sit Transfer    Stand-Sit [place] (Transfers) verbal cues;minimum assist (75% patient effort);2 person assist  -TR      Recorded by [TR] Aysha Tineo, PTA 09/01/18 0938      Row Name 09/01/18 0915             Gait/Stairs Assessment/Training    Gait/Stairs Assessment/Training gait/ambulation independence;gait/ambulation assistive device  -TR      [place] Level (Gait) minimum assist (75% patient effort);2 person assist  -TR      Assistive Device (Gait) walker, front-wheeled  -TR      Distance in Feet (Gait) few steps to chair   -TR      Pattern (Gait) swing-through  -TR      Deviations/Abnormal Patterns (Gait) stride length decreased;gait speed  decreased;festinating/shuffling;antalgic  -TR      Bilateral Gait Deviations forward flexed posture  -TR      Recorded by [TR] Aysha Tineo, PTA 09/01/18 0938      Row Name 09/01/18 0915             MMT (Manual Muscle Testing)    General MMT Comments LAQ's at Edge of bed for warm up   -TR      Recorded by [TR] Aysha Tieno, PTA 09/01/18 0938      Row Name 09/01/18 0915             Static Sitting Balance    Level of [place] (Unsupported Sitting, Static Balance) contact guard assist  -TR      Sitting Position (Unsupported Sitting, Static Balance) sitting on edge of bed  -TR      Level of [place] (Supported Sitting, Static Balance) supervision  -TR      Sitting Position (Supported Sitting, Static Balance) sitting in chair  -TR      Recorded by [TR] Aysha Tineo, DARYL 09/01/18 0938      Row Name 09/01/18 0915             Static Standing Balance    Level of [place] (Supported Standing, Static Balance) minimal assist, 75% patient effort  -TR      Recorded by [TR] Aysha Tineo, DARYL 09/01/18 0938      Row Name 09/01/18 0915             Dynamic Standing Balance    Level of [place], Reaches Outside Midline (Standing, Dynamic Balance) minimal assist, 75% patient effort   x2  -TR      Recorded by [TR] Aysha Tineo, DARYL 09/01/18 0938      Row Name 09/01/18 0915             Positioning and Restraints    Pre-Treatment Position in bed  -TR      Post Treatment Position chair  -TR      In Chair notified nsg;reclined;call light within reach;encouraged to call for assist;exit alarm on  -TR      Recorded by [TR] Aysha Tineo, DARYL 09/01/18 0938      Row Name 09/01/18 0915             Pain Scale: Numbers Pre/Post-Treatment    Pain Scale: Numbers, Pretreatment 2/10   WB  -TR      Recorded by [TR] Aysha Tineo, DARYL 09/01/18 0938      Row Name                Wound 08/26/18 0213 Left  abrasion    Wound - Properties Group Date first assessed: 08/26/18 [SB] Time first  assessed: 0213 [SB] Present On Admission : yes [SB] Side: Left [SB] Type: abrasion [SB] Recorded by:  [SB] Corrina Dobbs RN 08/26/18 0341    Row Name                Wound 08/26/18 0213 Left knee abrasion    Wound - Properties Group Date first assessed: 08/26/18 [SB] Time first assessed: 0213 [SB] Side: Left [SB] Location: knee [SB] Type: abrasion [SB] Recorded by:  [SB] Corrina Dobbs RN 08/26/18 0344    Row Name                Wound 08/28/18 2000 Left ankle abrasion    Wound - Properties Group Date first assessed: 08/28/18 [AR] Time first assessed: 2000 [AR] Present On Admission : yes [AR] Side: Left [AR] Location: ankle [AR] Type: abrasion [AR] Recorded by:  [AR] Glory Castorena RN 08/29/18 0104    Row Name                Wound 08/28/18 2000 Left heel abrasion    Wound - Properties Group Date first assessed: 08/28/18 [AR] Time first assessed: 2000 [AR] Present On Admission : yes [AR] Side: Left [AR] Location: heel [AR] Type: abrasion [AR] Recorded by:  [AR] Glory Castorena RN 08/29/18 0105    Row Name                Wound 08/28/18 2000 Right ankle abrasion    Wound - Properties Group Date first assessed: 08/28/18 [AR] Time first assessed: 2000 [AR] Present On Admission : yes [AR] Side: Right [AR] Location: ankle [AR] Type: abrasion [AR] Recorded by:  [AR] Glory Castorena RN 08/29/18 0106    Row Name                Wound 08/28/18 2000 Right heel abrasion    Wound - Properties Group Date first assessed: 08/28/18 [AR] Time first assessed: 2000 [AR] Present On Admission : yes [AR] Side: Right [AR] Location: heel [AR] Type: abrasion [AR] Recorded by:  [AR] Glory Castorena RN 08/29/18 0107    Row Name                Wound 08/31/18 2000 Left upper back abrasion    Wound - Properties Group Date first assessed: 08/31/18 [FI] Time first assessed: 2000 [FI] Present On Admission : no [FI] Side: Left [FI] Orientation: upper [FI] Location: back [FI] Type: abrasion [FI2] Recorded by:  [FI] Dina Thomson,  RNA 08/31/18 2152 [FI2] Dina Thomson, RNA 08/31/18 2155    Row Name                Wound 08/31/18 2000 lower back abrasion    Wound - Properties Group Date first assessed: 08/31/18 [AR] Time first assessed: 2000 [AR] Present On Admission : picture taken [AR] Orientation: lower [AR] Location: back [AR] Type: abrasion [AR] Recorded by:  [AR] Glory Castorena RN 09/01/18 0444    Row Name                Wound 08/31/18 2000 Right hip abrasion    Wound - Properties Group Date first assessed: 08/31/18 [AR] Time first assessed: 2000 [AR] Present On Admission : picture taken [AR] Side: Right [AR] Location: hip [AR] Type: abrasion [AR] Recorded by:  [AR] Glory Castorena RN 09/01/18 0447      User Key  (r) = Recorded By, (t) = Taken By, (c) = Cosigned By    Initials Name Effective Dates Discipline    AR Glory Castorena RN 08/02/16 -  Nurse    Corrina Xie RN 12/15/17 -  Nurse    Aysha Ruby, DARYL 08/02/16 -  PT    FI Dina Thomson, RNA 01/16/18 -  Nurse          Wound 08/26/18 0213 Left  abrasion (Active)   Dressing Appearance dried drainage 8/31/2018  8:00 PM   Closure LUIS FERNANDO 9/1/2018  7:55 AM   Periwound intact;blanchable 9/1/2018  7:55 AM   Drainage Amount small 8/31/2018  8:00 PM   Dressing Care, Wound foam 9/1/2018  7:55 AM       Wound 08/26/18 0213 Left knee abrasion (Active)   Dressing Appearance open to air 9/1/2018  7:55 AM   Closure None 9/1/2018  7:55 AM   Base scab 9/1/2018  7:55 AM   Periwound blanchable;intact 9/1/2018  7:55 AM   Drainage Amount none 8/31/2018  8:00 PM   Dressing Care, Wound open to air 9/1/2018  7:55 AM       Wound 08/28/18 2000 Left ankle abrasion (Active)   Dressing Appearance dry;intact 9/1/2018  7:55 AM   Closure LUIS FERNANDO 9/1/2018  7:55 AM   Base dressing in place, unable to visualize 9/1/2018  7:55 AM   Drainage Amount scant 8/31/2018  8:00 PM   Dressing Care, Wound foam 9/1/2018  7:55 AM       Wound 08/28/18 2000 Left heel abrasion (Active)   Dressing Appearance open  to air 9/1/2018  7:55 AM   Base dry;slough 9/1/2018  7:55 AM   Drainage Amount none 9/1/2018  7:55 AM   Dressing Care, Wound open to air 9/1/2018  7:55 AM       Wound 08/28/18 2000 Right ankle abrasion (Active)   Dressing Appearance dry;intact 9/1/2018  7:55 AM   Closure LUIS FERNANDO 9/1/2018  7:55 AM   Base dressing in place, unable to visualize 9/1/2018  7:55 AM   Drainage Amount scant 8/31/2018  8:00 PM   Dressing Care, Wound foam 9/1/2018  7:55 AM       Wound 08/28/18 2000 Right heel abrasion (Active)   Dressing Appearance open to air 9/1/2018  7:55 AM   Closure Open to air 9/1/2018  7:55 AM   Base dry;slough 9/1/2018  7:55 AM   Drainage Amount none 9/1/2018  7:55 AM   Dressing Care, Wound open to air 9/1/2018  7:55 AM       Wound 08/31/18 2000 Left upper back abrasion (Active)   Wound Image   8/31/2018  8:00 PM   Dressing Appearance dry;intact 9/1/2018  7:55 AM   Drainage Amount scant 8/31/2018  8:00 PM   Dressing Care, Wound foam 9/1/2018  7:55 AM       Wound 08/31/18 2000 lower back abrasion (Active)   Wound Image   8/31/2018  8:00 PM       Wound 08/31/18 2000 Right hip abrasion (Active)   Wound Image   8/31/2018  8:00 PM             Physical Therapy Education     Title: PT OT SLP Therapies (Active)     Topic: Physical Therapy (Active)     Point: Mobility training (Active)    Learning Progress Summary     Learner Status Readiness Method Response Comment Documented by    Patient Active Acceptance E,D NR Safety with transfers and gait, benefits of activity TR 09/01/18 0939     Done Acceptance E,TB,D VU,DU,NR Education for safety/falls prevention with activity  Education for improved technique with bed mobility, transfers, and taking steps at bedside to reduce risk for LOB/falls  08/27/18 1201    Family Active Acceptance E,D NR Safety with transfers and gait, benefits of activity TR 09/01/18 0939          Point: Precautions (Done)    Learning Progress Summary     Learner Status Readiness Method Response Comment  Documented by    Patient Done Acceptance E,TB,D MODE,DU,NR Education for safety/falls prevention with activity  Education for improved technique with bed mobility, transfers, and taking steps at bedside to reduce risk for LOB/falls  08/27/18 1201                      User Key     Initials Effective Dates Name Provider Type Discipline     08/02/16 -  Aysha Tineo, PTA Physical Therapy Assistant PT     08/02/18 -  Marilia Farrar, PT Physical Therapist PT                    PT Recommendation and Plan     Plan of Care Reviewed With: patient  Progress: improving  Outcome Summary: Pt fowlers in bed attempting to eat breakfest. Pt not able to feed himself, notifed nursing. Pt able to state his name and location; however not oriented to time and date. Pt required min assist to get EOB and mod assist to scoot EOB. Pt required mod x2 to stand and take steps to chair. Pt required constant cueing to continue to take steps toward chair. Pt unable to control descend into chair and required min assist x2 for safety. Pt will continue to benefit from skilled PT to improve strength, balance, safety awareness, and overall mobility.           Outcome Measures     Row Name 09/01/18 0915 08/29/18 1300          How much help from another person do you currently need...    Turning from your back to your side while in flat bed without using bedrails? 3  -TR  --     Moving from lying on back to sitting on the side of a flat bed without bedrails? 3  -TR  --     Moving to and from a bed to a chair (including a wheelchair)? 2  -TR  --     Standing up from a chair using your arms (e.g., wheelchair, bedside chair)? 2  -TR  --     Climbing 3-5 steps with a railing? 1  -TR  --     To walk in hospital room? 2  -TR  --     AM-PAC 6 Clicks Score 13  -TR  --        How much help from another is currently needed...    Putting on and taking off regular lower body clothing?  -- 2  -TS     Bathing (including washing, rinsing, and drying)  --  3  -TS     Toileting (which includes using toilet bed pan or urinal)  -- 3  -TS     Putting on and taking off regular upper body clothing  -- 3  -TS     Taking care of personal grooming (such as brushing teeth)  -- 3  -TS     Eating meals  -- 4  -TS     Score  -- 18  -TS        Functional Assessment    Outcome Measure Options AM-PAC 6 Clicks Basic Mobility (PT)  -TR AM-PAC 6 Clicks Daily Activity (OT)  -TS       User Key  (r) = Recorded By, (t) = Taken By, (c) = Cosigned By    Initials Name Provider Type    TS Ena Loera COTA/L Occupational Therapy Assistant    Aysha Ruby PTA Physical Therapy Assistant           Time Calculation:         PT Charges     Row Name 09/01/18 0915             Time Calculation    Start Time 0915  -TR      Stop Time 0938  -TR      Time Calculation (min) 23 min  -TR      PT Received On 09/01/18  -TR      PT Goal Re-Cert Due Date 09/06/18  -TR         Time Calculation- PT    Total Timed Code Minutes- PT 23 minute(s)  -TR        User Key  (r) = Recorded By, (t) = Taken By, (c) = Cosigned By    Initials Name Provider Type    Aysha Ruby, DARYL Physical Therapy Assistant        Therapy Suggested Charges     Code   Minutes Charges    70617 (CPT®) Hc Pt Neuromusc Re Education Ea 15 Min      62686 (CPT®) Hc Pt Ther Proc Ea 15 Min      87096 (CPT®) Hc Gait Training Ea 15 Min      48143 (CPT®) Hc Pt Therapeutic Act Ea 15 Min 27 2    41655 (CPT®) Hc Pt Manual Therapy Ea 15 Min      65001 (CPT®) Hc Pt Iontophoresis Ea 15 Min      59057 (CPT®) Hc Pt Elec Stim Ea-Per 15 Min      13445 (CPT®) Hc Pt Ultrasound Ea 15 Min      11627 (CPT®) Hc Pt Self Care/Mgmt/Train Ea 15 Min      42907 (CPT®) Hc Pt Prosthetic (S) Train Initial Encounter, Each 15 Min      18451 (CPT®) Hc Pt Orthotic(S)/Prosthetic(S) Encounter, Each 15 Min      63054 (CPT®) Hc Orthotic(S) Mgmt/Train Initial Encounter, Each 15min      Total  27 2        Therapy Charges for Today     Code Description Service  Date Service Provider Modifiers Qty    73862006943 HC PT THERAPEUTIC ACT EA 15 MIN 9/1/2018 Aysha Tineo PTA GP, KX 2          PT G-Codes  PT Professional Judgement Used?: Yes  Outcome Measure Options: AM-PAC 6 Clicks Basic Mobility (PT)  Score: 15  Functional Limitation: Mobility: Walking and moving around  Mobility: Walking and Moving Around Current Status (): At least 40 percent but less than 60 percent impaired, limited or restricted  Mobility: Walking and Moving Around Goal Status (): At least 20 percent but less than 40 percent impaired, limited or restricted    Aysha Tineo PTA  9/1/2018

## 2018-09-02 LAB
ANION GAP SERPL CALCULATED.3IONS-SCNC: 10 MMOL/L (ref 4–13)
BUN BLD-MCNC: 33 MG/DL (ref 5–21)
BUN/CREAT SERPL: 19.2 (ref 7–25)
CALCIUM SPEC-SCNC: 8.9 MG/DL (ref 8.4–10.4)
CHLORIDE SERPL-SCNC: 114 MMOL/L (ref 98–110)
CO2 SERPL-SCNC: 21 MMOL/L (ref 24–31)
CREAT BLD-MCNC: 1.72 MG/DL (ref 0.5–1.4)
GFR SERPL CREATININE-BSD FRML MDRD: 40 ML/MIN/1.73
GLUCOSE BLD-MCNC: 85 MG/DL (ref 70–100)
POTASSIUM BLD-SCNC: 4.8 MMOL/L (ref 3.5–5.3)
SODIUM BLD-SCNC: 145 MMOL/L (ref 135–145)

## 2018-09-02 PROCEDURE — 25010000002 CEFTRIAXONE PER 250 MG: Performed by: INTERNAL MEDICINE

## 2018-09-02 PROCEDURE — 99231 SBSQ HOSP IP/OBS SF/LOW 25: CPT | Performed by: NEUROLOGICAL SURGERY

## 2018-09-02 PROCEDURE — 92526 ORAL FUNCTION THERAPY: CPT

## 2018-09-02 PROCEDURE — 97530 THERAPEUTIC ACTIVITIES: CPT

## 2018-09-02 PROCEDURE — 80048 BASIC METABOLIC PNL TOTAL CA: CPT | Performed by: INTERNAL MEDICINE

## 2018-09-02 RX ORDER — TAMSULOSIN HYDROCHLORIDE 0.4 MG/1
0.4 CAPSULE ORAL DAILY
Status: DISCONTINUED | OUTPATIENT
Start: 2018-09-02 | End: 2018-09-05 | Stop reason: HOSPADM

## 2018-09-02 RX ORDER — HYDROXYZINE HYDROCHLORIDE 10 MG/1
10 TABLET, FILM COATED ORAL EVERY 6 HOURS PRN
Status: DISCONTINUED | OUTPATIENT
Start: 2018-09-02 | End: 2018-09-05 | Stop reason: HOSPADM

## 2018-09-02 RX ORDER — LACTULOSE 20 G/30ML
30 SOLUTION ORAL 3 TIMES DAILY
Status: DISCONTINUED | OUTPATIENT
Start: 2018-09-02 | End: 2018-09-05 | Stop reason: HOSPADM

## 2018-09-02 RX ORDER — SODIUM CHLORIDE 9 MG/ML
75 INJECTION, SOLUTION INTRAVENOUS CONTINUOUS
Status: DISCONTINUED | OUTPATIENT
Start: 2018-09-02 | End: 2018-09-04

## 2018-09-02 RX ADMIN — WATER 300 ML: 1 IRRIGANT IRRIGATION at 14:57

## 2018-09-02 RX ADMIN — CEFTRIAXONE SODIUM 1 G: 1 INJECTION, POWDER, FOR SOLUTION INTRAMUSCULAR; INTRAVENOUS at 17:42

## 2018-09-02 RX ADMIN — ACETAMINOPHEN 650 MG: 650 SUPPOSITORY RECTAL at 22:42

## 2018-09-02 RX ADMIN — SODIUM CHLORIDE 75 ML/HR: 9 INJECTION, SOLUTION INTRAVENOUS at 18:53

## 2018-09-02 RX ADMIN — SODIUM BICARBONATE 650 MG: 650 TABLET ORAL at 08:25

## 2018-09-02 RX ADMIN — LACTULOSE 30 G: 20 SOLUTION ORAL at 11:02

## 2018-09-02 RX ADMIN — SODIUM CHLORIDE 250 ML: 9 INJECTION, SOLUTION INTRAVENOUS at 14:10

## 2018-09-02 NOTE — PLAN OF CARE
Problem: Patient Care Overview  Goal: Plan of Care Review  Outcome: Ongoing (interventions implemented as appropriate)   09/02/18 1139   Coping/Psychosocial   Plan of Care Reviewed With patient   Plan of Care Review   Progress no change   OTHER   Outcome Summary Pt very lethargic this AM and difficult to keep aroused throughout treatment. This did improve and pt was able to slightly communicate; however confused. Pt required max assist for bed mobility and mod-max x2 for transfers. Pt required min assist to stand erect and look up. Pt able to tolerate standing 2-3 minutes. Attempted side steps ; however pt only able to shuffle L foot. Pt was able to state his name; however unoriented to all other. Pt left fowlers with exit alarm and family in room.

## 2018-09-02 NOTE — PLAN OF CARE
Problem: Fall Risk (Adult)  Goal: Absence of Fall  Outcome: Ongoing (interventions implemented as appropriate)      Problem: Skin Injury Risk (Adult)  Goal: Skin Health and Integrity  Outcome: Ongoing (interventions implemented as appropriate)      Problem: Patient Care Overview  Goal: Plan of Care Review  Outcome: Ongoing (interventions implemented as appropriate)   09/02/18 0249   Coping/Psychosocial   Plan of Care Reviewed With patient;family   Plan of Care Review   Progress no change   OTHER   Outcome Summary Pt. continues to pick at skin and itch. Pt has had episodes of lower BP. Q2hr turns. Jarvis in place and jarvis care done. Drowsy and confused to time, place, and situation. VSS. Safety maintained.       Problem: Alcohol Withdrawal Acute, Risk/Actual (Adult)  Goal: Signs and Symptoms of Listed Potential Problems Will be Absent, Minimized or Managed (Alcohol Withdrawal Acute, Risk/Actual)  Outcome: Ongoing (interventions implemented as appropriate)      Problem: Nutrition, Imbalanced: Inadequate Oral Intake (Adult)  Goal: Improved Oral Intake  Outcome: Ongoing (interventions implemented as appropriate)      Problem: Brain Injury, Moderate Traumatic (GCS 9-12) (Adult)  Goal: Signs and Symptoms of Listed Potential Problems Will be Absent, Minimized or Managed (Brain Injury, Moderate Traumatic)  Outcome: Ongoing (interventions implemented as appropriate)

## 2018-09-02 NOTE — PROGRESS NOTES
Nephrology ([facility]) Progress Note      Patient:  Ryan Alvarez  YOB: 1950  Date of Service: 9/2/2018  MRN: [id number]   Acct: [id number]   Primary Care Physician: Rufino Stevens APRN  Advance Directive:   Code Status and Medical Interventions:   Ordered at: 08/26/18 0454     Level Of Support Discussed With:    Patient     Code Status:    CPR     Medical Interventions (Level of Support Prior to Arrest):    Full     Admit Date: 8/25/2018       Hospital Day: 7  Referring Provider: Tayo Almonte MD      Patient personally seen and examined.  Complete chart including Consults, Notes, Operative Reports, Labs, Cardiology, and Radiology studies reviewed as able.        Subjective:  Ryan Alvarez is a 68 y.o. male  whom we were consulted for acute kidney injury.  No prior nephrological contacts; unknown renal baseline.  History of ETOH abuse, cirrhosis.  Presented to [facility] after a fall at home with an unknown down time.  He remembers falling outside and crawling back into house; was found some time later by family member.  Diagnosed with ТАТЬЯНА and subdural hematoma.  Transferred to [facility] for higher level of care.  Required pressors briefly after arrival for blood pressure support; these are now discontinued and pt was moved to the floor. Patient has refused catheter.       More confused today again.  No n/v/d/cp.  Pedro placed for decreased UOP. Minimal po intake today.      Allergies:  Patient has no known allergies.    Home Meds:  Prescriptions Prior to Admission   Medication Sig Dispense Refill Last Dose   • furosemide (LASIX) 40 MG tablet Take 40 mg by mouth Daily.      • hydrOXYzine (ATARAX) 10 MG tablet Take 10 mg by mouth 4 (Four) Times a Day.      • omeprazole (priLOSEC) 20 MG capsule Take 20 mg by mouth Daily.      • propranolol (INDERAL) 10 MG tablet Take 10 mg by mouth 2 (Two) Times a Day.      • spironolactone (ALDACTONE) 100 MG  tablet Take 100 mg by mouth 2 (Two) Times a Day.          Medicines:  Current Facility-Administered Medications   Medication Dose Route Frequency Provider Last Rate Last Dose   • acetaminophen (TYLENOL) tablet 650 mg  650 mg Oral Q4H PRN Tayo Almonte MD   650 mg at 09/01/18 1839    Or   • acetaminophen (TYLENOL) suppository 650 mg  650 mg Rectal Q4H PRN Tayo Almonte MD       • acetaminophen (TYLENOL) tablet 650 mg  650 mg Oral Q6H PRN Tayo Almonte MD   650 mg at 08/28/18 2128   • flintstones complete (FLINTSTONES) chewable tablet 1 tablet  1 tablet Oral Daily Bennett Manzo DO   1 tablet at 09/01/18 0908   • guaiFENesin (MUCINEX) 12 hr tablet 1,200 mg  1,200 mg Oral Q12H Bennett Manzo DO   1,200 mg at 09/01/18 0908   • Hold medication  1 each Does not apply Continuous PRN Bennett Manzo DO       • hydrOXYzine (ATARAX) tablet 10 mg  10 mg Oral Q6H PRN Bennett Manzo DO       • ipratropium-albuterol (DUO-NEB) nebulizer solution 3 mL  3 mL Nebulization Q4H PRN Bennett Manzo DO   3 mL at 08/31/18 1942   • lactulose solution 30 g  30 g Oral TID Bennett Manzo DO       • ondansetron (ZOFRAN) tablet 4 mg  4 mg Oral Q6H PRN Tayo Almonte MD       • pantoprazole (PROTONIX) EC tablet 40 mg  40 mg Oral Q AM Bennett Manzo DO   40 mg at 09/01/18 1148   • propranolol (INDERAL) tablet 10 mg  10 mg Oral Q12H Bennett Manzo DO   10 mg at 09/01/18 0908   • sodium bicarbonate tablet 650 mg  650 mg Oral BID Sina Casas APRN   650 mg at 09/02/18 0825   • sodium chloride 0.9 % flush 1-10 mL  1-10 mL Intravenous PRN Tayo Almonte MD       • tamsulosin (FLOMAX) 24 hr capsule 0.4 mg  0.4 mg Oral Daily Manzo, Bennett C, DO           Past Medical History:  Past Medical History:   Diagnosis Date   • Alcohol abuse    • Ascites    • Cirrhosis of liver (CMS/HCC)    • Hernia of abdominal wall        Past Surgical History:  Past Surgical History:   Procedure Laterality Date   • BACK SURGERY    "      Family History  History reviewed. No pertinent family history.    Social History  Social History     Social History   • Marital status:      Spouse name: N/A   • Number of children: N/A   • Years of education: N/A     Occupational History   • Not on file.     Social History Main Topics   • Smoking status: Never Smoker   • Smokeless tobacco: Never Used   • Alcohol use Yes      Comment: at least 6 pack/daily   • Drug use: No   • Sexual activity: Not on file     Other Topics Concern   • Not on file     Social History Narrative   • No narrative on file         Review of Systems:  History obtained from chart review and the patient  General ROS: No fever or chills  Respiratory ROS: No cough, shortness of breath, wheezing  Cardiovascular ROS: No chest pain or palpitations  Gastrointestinal ROS: No abdominal pain or melena  Genito-Urinary ROS: No dysuria or hematuria    Objective:  /51 (BP Location: Right arm, Patient Position: Lying)   Pulse 64   Temp 96.7 °F (35.9 °C) (Tympanic)   Resp 16   Ht 188 cm (74\")   Wt 94.9 kg (209 lb 4.8 oz)   SpO2 97%   BMI 26.87 kg/m²     Intake/Output Summary (Last 24 hours) at 09/02/18 1820  Last data filed at 09/02/18 1600   Gross per 24 hour   Intake                0 ml   Output              275 ml   Net             -275 ml     General: awake/alert   Chest:  clear to auscultation bilaterally without respiratory distress  CVS: regular rate and rhythm  Abdominal: soft/mild distention/umbilical hernia  Extremities: tr ble edema  Skin: warm and dry without rash      Labs:    Results from last 7 days  Lab Units 09/01/18  0504 08/31/18  0642 08/30/18  0657 08/29/18  0623   WBC 10*3/mm3 5.30  --  5.05 4.99   HEMOGLOBIN g/dL 9.1* 8.9* 8.7* 8.9*   HEMATOCRIT % 28.6* 27.5* 27.2* 27.5*   PLATELETS 10*3/mm3 151  --  144 168           Results from last 7 days  Lab Units 09/02/18  0512 09/01/18  0504 08/31/18  0642 08/30/18  0657   SODIUM mmol/L 145 143 142 143   POTASSIUM " mmol/L 4.8 4.6 4.8 4.1   CHLORIDE mmol/L 114* 113* 113* 112*   CO2 mmol/L 21.0* 22.0* 24.0 23.0*   BUN mg/dL 33* 31* 29* 28*   CREATININE mg/dL 1.72* 1.55* 1.32 1.49*   CALCIUM mg/dL 8.9 8.7 8.5 8.1*   BILIRUBIN mg/dL  --  0.7 1.0 0.7   ALK PHOS U/L  --  77 74 87   ALT (SGPT) U/L  --  47 37 37   AST (SGOT) U/L  --  42 51* 41   GLUCOSE mg/dL 85 71 72 117*       Radiology:   Imaging Results (last 72 hours)     Procedure Component Value Units Date/Time    CT Head Without Contrast [[id number]] Collected:  08/31/18 1321     Updated:  08/31/18 1332    Narrative:       EXAM: CT OF THE HEAD WITHOUT IV CONTRAST 8/31/2018.     COMPARISON: Head CT dated 8/29/2018      INDICATION: Male, 68 years-old. Subdural hemorrhage      PROCEDURE: Non contrast enhanced head CT was performed.      Radiation dose equals  mGy-cm.  Automated exposure control dose  reduction technique was implemented.     FINDINGS:      Since most recent head CT dated 8/29/2018, there has been no significant  interval change in the right near hemispheric subdural hematoma. Midline  shift to the left is unchanged, approximately 3 mm. Right frontal  cavernous malformation unchanged. No new focus of intracranial  hemorrhage. No acute hydrocephalus.       Impression:       No significant interval changes.  This report was finalized on 08/31/2018 13:28 by Dr. Stefani Dunaway MD.     Thoracentesis [[id number]] Collected:  08/30/18 1413    Specimen:  Body Fluid Updated:  08/31/18 1151    Narrative:       DATE OF PROCEDURE:  8/30/2018.     PREPROCEDURE DIAGNOSIS:  Right pleural effusion.  POSTPROCEDURE DIAGNOSIS:  Right pleural effusion.          INDICATIONS FOR PROCEDURE: Dyspnea     PROCEDURE:   1. Thoracentesis, diagnostic and therapeutic.  2. Ultrasound guidance for thoracentesis, imaging supervision and  interpretation.     ANESTHESIA: 1% lidocaine, injected locally.          COMPLICATIONS: None.        EXAMINATIONS FOR COMPARISON:  CT chest dated  8/29/2018.     DESCRIPTION OF PROCEDURE:   The risk and benefits of the procedure were explained to the patient.   Specifically, the risks of bleeding, infection, pneumothorax (possible  thoracostomy tube), and damage to surrounding structures were discussed  extensively. The patient's questions were answered. The patient opted to  proceed. Written and verbal consent were obtained from the patient.     TIME OUT was taken and the patient's name, medical record number, date  of birth, procedure, entry site, and listen allergies were confirmed.  The site of the procedure was confirmed and marked.      The rightposterior thorax was prepped and draped in sterile fashion. The  area was anesthetized with buffered lidocaine 2%.      A 5 Tongan 10 cm  catheter was inserted into the pleural effusion  using  ultrasound guidance. Approximately 1500 mL of tea colored fluid was  recovered and sent to the pathology lab for analysis.      The patient tolerated the procedure well.   No immediate complications  were noted.       Impression:       Successful ultrasound guided rightthoracentesis.. No immediate  complications were noted.              This report was finalized on 08/30/2018 14:34 by Dr. Stefani Dunaway MD.     Chest [[id number]] Collected:  08/30/18 1413     Updated:  08/31/18 1151    Narrative:       DATE OF PROCEDURE:  8/30/2018.     PREPROCEDURE DIAGNOSIS:  Right pleural effusion.  POSTPROCEDURE DIAGNOSIS:  Right pleural effusion.          INDICATIONS FOR PROCEDURE: Dyspnea     PROCEDURE:   1. Thoracentesis, diagnostic and therapeutic.  2. Ultrasound guidance for thoracentesis, imaging supervision and  interpretation.     ANESTHESIA: 1% lidocaine, injected locally.          COMPLICATIONS: None.        EXAMINATIONS FOR COMPARISON:  CT chest dated 8/29/2018.     DESCRIPTION OF PROCEDURE:   The risk and benefits of the procedure were explained to the patient.   Specifically, the risks of bleeding, infection,  pneumothorax (possible  thoracostomy tube), and damage to surrounding structures were discussed  extensively. The patient's questions were answered. The patient opted to  proceed. Written and verbal consent were obtained from the patient.     TIME OUT was taken and the patient's name, medical record number, date  of birth, procedure, entry site, and listen allergies were confirmed.  The site of the procedure was confirmed and marked.      The rightposterior thorax was prepped and draped in sterile fashion. The  area was anesthetized with buffered lidocaine 2%.      A 5 Czech 10 cm  catheter was inserted into the pleural effusion  using  ultrasound guidance. Approximately 1500 mL of tea colored fluid was  recovered and sent to the pathology lab for analysis.      The patient tolerated the procedure well.   No immediate complications  were noted.       Impression:       Successful ultrasound guided rightthoracentesis.. No immediate  complications were noted.              This report was finalized on 08/30/2018 14:34 by Dr. Stefani Dunaway MD.    MRI Brain With & Without Contrast [[id number]] Collected:  08/30/18 1458     Updated:  08/30/18 1518    Narrative:       EXAM: MR BRAIN WITHOUT AND WITH IV CONTRAST 8/30/2018     COMPARISON: Head CT dated 8/29/2018      HISTORY: 68 years-old Male. Right frontal calcification;  I62.00-Nontraumatic subdural hemorrhage, unspecified; N17.9-Acute kidney  failure, unspecified; E86.0-Dehydration; D64.9-Anemia, unspecified;  M62.82-Rhabdomyolysis; F10.10-Alcohol abuse, uncomplicated;  W19.XXXA-Unspecified fall, initial encounter; T07.XXXA-Unspecified  multiple injuries, initial encounter; R74.8-Abnormal levels of other  serum enzymes; Z74.09-Other reduce     TECHNIQUE:   Routine pulse sequences were obtained of the brain before and after the  administration of IV contrast.      REPORT:      There is a near right hemispheric subdural hematoma, with maximum  thickness of 1.5 cm. 3 mm  right to left midline shift. There is no  evidence of intraventricular hemorrhage.     The previously seen hyperdensity in head CT centered in the right  superior frontal gyrus corresponds with a cavernous malformation  measuring 2 cm. There is no associated T2/FLAIR abnormal signal to  suggest recent hemorrhage.        There is no diffusion restriction within the brain parenchyma to suggest  acute infarct. No acute hydrocephalus.       Impression:       1.  known-right right near hemispheric subdural hematoma.  2.  Right frontal cavernous malformation.  This report was finalized on 08/30/2018 15:15 by Dr. Stefani Dunaway MD.    XR Chest AP [[id number]] Collected:  08/30/18 1414     Updated:  08/30/18 1417    Narrative:       XR CHEST AP- 8/30/2018 2:06 PM CDT     HISTORY: Post thoracentesis of the RIGHT pleural effusion     COMPARISON: 8/29/2018.     FINDINGS:   The RIGHT pleural effusion has significantly improved. There is no  evidence of pneumothorax. The LEFT lung is stable. The cardiomediastinal  silhouette and pulmonary vascularity are unchanged.      The osseous structures and surrounding soft tissues demonstrate no acute  abnormality.       Impression:       1. No pneumothorax after RIGHT thoracentesis.        This report was finalized on 08/30/2018 14:14 by Dr. Brenton Horne MD.          Culture:         Assessment   ТАТЬЯНА  Rhabdomyolysis  Fall with SDH  Alcoholic cirrhosis  Anemia  Metabolic acidosis     Plan:  PRBC prn  Bicarb  Stop diuretics and has not been taking any po  Restart IVF  D/w RN    Vlad Cahudhry MD  9/2/2018  6:20 PM

## 2018-09-02 NOTE — PLAN OF CARE
Problem: Patient Care Overview  Goal: Plan of Care Review  Outcome: Ongoing (interventions implemented as appropriate)   09/02/18 1323   Coping/Psychosocial   Plan of Care Reviewed With patient;family   Plan of Care Review   Progress improving   OTHER   Outcome Summary Pt's lethargy conitnues, however he was noted to be more alert on this date than on last SLP visit. He completed trials of pureed, honey thick, nectar thick, and thin liquids. Delayed swallow initiation noted as well as decreased oral manipulation, however functional with consistencies provided. Questionable vocal quality change with thin liquids 1x. Pt ok for a pureed diet with nectar thick liquids when alert and upright. Ok for sips of water or ice chips between meals 30 minutes following PO intake. Meds to continue being administered crushed in pudding/applesauce. SLP will continue to follow and treat.

## 2018-09-02 NOTE — PLAN OF CARE
Problem: Fall Risk (Adult)  Goal: Absence of Fall  Outcome: Ongoing (interventions implemented as appropriate)      Problem: Skin Injury Risk (Adult)  Goal: Skin Health and Integrity  Outcome: Ongoing (interventions implemented as appropriate)      Problem: Patient Care Overview  Goal: Plan of Care Review  Outcome: Ongoing (interventions implemented as appropriate)   09/02/18 1741   Coping/Psychosocial   Plan of Care Reviewed With patient   Plan of Care Review   Progress no change   OTHER   Outcome Summary Pt severly lethagic today and disorientedx4; garbled/slurred speech. No c/o pain today. Turn q2. Several skin problems; drsgs d/i with meplilex. Speech and PT had pt awake today for about 30 minutes, but pt has become completely drowy since then and unable to take medications. Lactulose enema given. Large BMx2. FC to BSD; very little uop today. MD notified this afternoon and bolus given. Dr. Chaudhry notified that pt has only had 75 ml out of jarvis between noon and 1600. Urine 2+ leuk esterase and 1+ bacteria, BUN 33, and creat 1.72 this am. VSS. Cont to monitor.      Goal: Individualization and Mutuality  Outcome: Ongoing (interventions implemented as appropriate)    Goal: Discharge Needs Assessment  Outcome: Ongoing (interventions implemented as appropriate)      Problem: Alcohol Withdrawal Acute, Risk/Actual (Adult)  Goal: Signs and Symptoms of Listed Potential Problems Will be Absent, Minimized or Managed (Alcohol Withdrawal Acute, Risk/Actual)  Outcome: Ongoing (interventions implemented as appropriate)      Problem: Nutrition, Imbalanced: Inadequate Oral Intake (Adult)  Goal: Improved Oral Intake  Outcome: Ongoing (interventions implemented as appropriate)      Problem: Brain Injury, Moderate Traumatic (GCS 9-12) (Adult)  Goal: Signs and Symptoms of Listed Potential Problems Will be Absent, Minimized or Managed (Brain Injury, Moderate Traumatic)  Outcome: Ongoing (interventions implemented as  appropriate)

## 2018-09-02 NOTE — PROGRESS NOTES
[facility]  INPATIENT PROGRESS NOTE    Patient Name: Ryan Alvarez  Date of Admission: 8/25/2018  Today's Date: 09/02/18  Length of Stay: 7  Primary Care Physician: Rufino Stevens APRN    Subjective   Chief Complaint: encephalopathy  HPI   Continues to be intermittently encephalopathic.  Doing fairly well at present.  Working with physical therapy and speech therapy concurrently.  His daughter is present and questions were answered with her.    Pedro anchored for urinary retention.    Review of Systems   All pertinent negatives and positives are as above. All other systems have been reviewed and are negative unless otherwise stated.     Objective    Temp:  [96.7 °F (35.9 °C)-98.9 °F (37.2 °C)] 96.7 °F (35.9 °C)  Heart Rate:  [52-95] 57  Resp:  [16-18] 16  BP: ()/(38-68) 99/51  Physical Exam  Constitutional: Up with PT and his walker. Daughter present. Discussed with his nurse, Kim.   Head: Normocephalic and atraumatic.   Eyes: Pupils are equal, round, and reactive to light. Conjunctivae and EOM are normal.   Neck: Neck supple. No JVD present.   Cardiovascular: Normal rate, regular rhythm, normal heart sounds and intact distal pulses.  Exam reveals no gallop and no friction rub. No murmur heard.  Pulmonary/Chest: Effort normal. No respiratory distress. Diminished breath sounds in the right lung base. Remains on room air.    Abdominal: Soft. Bowel sounds are normal. He exhibits distension (softer today). There is no tenderness. There is no rebound and no guarding. A hernia (reducible umbilical) is present.   Musculoskeletal: Normal range of motion. He exhibits no edema, tenderness or deformity.   Neurological: Awake, alert. Oriented to person and place. He displays normal reflexes. No cranial nerve deficit. He exhibits normal muscle tone. No tremor.  Generalized weakness, nonfocal.   Skin: Skin is warm and dry. No rash noted. Widespread abrasions and  bruises.    Psychiatric: He has a normal mood and affect. His behavior is normal. Judgment and thought content normal.    Results Review:  I have reviewed the labs, radiology results, and diagnostic studies.    Laboratory Data:     Results from last 7 days  Lab Units 09/01/18  0504 08/31/18  0642 08/30/18  0657 08/29/18  0623   WBC 10*3/mm3 5.30  --  5.05 4.99   HEMOGLOBIN g/dL 9.1* 8.9* 8.7* 8.9*   HEMATOCRIT % 28.6* 27.5* 27.2* 27.5*   PLATELETS 10*3/mm3 151  --  144 168       Results from last 7 days  Lab Units 09/02/18  0512 09/01/18  0504 08/31/18  0642 08/30/18  0657   SODIUM mmol/L 145 143 142 143   POTASSIUM mmol/L 4.8 4.6 4.8 4.1   CHLORIDE mmol/L 114* 113* 113* 112*   CO2 mmol/L 21.0* 22.0* 24.0 23.0*   BUN mg/dL 33* 31* 29* 28*   CREATININE mg/dL 1.72* 1.55* 1.32 1.49*   CALCIUM mg/dL 8.9 8.7 8.5 8.1*   BILIRUBIN mg/dL  --  0.7 1.0 0.7   ALK PHOS U/L  --  77 74 87   ALT (SGPT) U/L  --  47 37 37   AST (SGOT) U/L  --  42 51* 41   GLUCOSE mg/dL 85 71 72 117*     Culture Data:   Urine Culture   Date Value Ref Range Status   09/01/2018 No growth at 24 hours  Preliminary     I have reviewed the patient's current medications.     Assessment/Plan   Assessment:   1.  Fall.  2.  Traumatic right frontoparietal subdural hematoma.  3.  Encephalopathy, multifactorial (PSE, SDH).   4.  Acute renal failure with concern for rhabdomyolysis, improving.  5.  Volume depletion, improved.  6.  Alcohol abuse.  7.  Alcoholic cirrhosis of the liver with ascites.  8.  Elevated troponin.   9.  Normocytic anemia, no signs of acute blood loss, iron deficient; occult negative.     10.  Slight coagulopathy, related to liver disease.  11.  Opacification of the right lung; likely hepatic hydrothorax, status post thoracentesis.     Plan:   MRI done on 8/30 suggested progression of his subdural hematoma stating near hemispheric with shift. Prior CTs describe this as being mainly frontal. Dr. Salinas was contacted by nursing regarding this and  repeated a CT, which was stable. No surgery planned for now.       CT scan of the chest without contrast on 8/29 showed what is likely a hepatic hydrothorax. Thoracentesis on 8/30 yielded 1.5 liters. Transudative, consistent with hepatic hydrothorax, likely to recur.  Microbiology data from this fluid is negative so far.  Incentive spirometry, mucolytics, and bronchodilators for now. Some patchy WILI infiltrate on CT as well, but no leukocytosis or febrile episodes to suggest an infectious process. He also remains on room air.     Renal function had stalled around 1.5 (baseline?), now worsening again. Nephrology is following as well. Stopped IV fluids on 8/30. Stopped checking CPK as it had normalized. Renal ultrasound was unremarkable. Nephrology has restarted a small dose of Lasix as of 8/31. He needs to be on aldactone at some point.  He was recently due for an outpatient paracentesis with Dr. Suarez. He may need one prior to discharge, but his abdomen does not seem as distended as recent days. Hold for now.      Status post 2 units packed red blood cells on 8/27.  Hemoglobin stable since. IV Venofer.  No signs of acute bleeding.  Negative fecal occult test.      Stopped scheduled Valium on 8/31. Status post IV thiamine.     Ammonia 16-32 (normal), but that does not necessarily disqualify portosystemic encephalopathy. Lactulose enema given on 8/31. BID oral lactulose.         Pedro anchored for urinary retention.  Start Flomax.    Physical and occupational therapy.     SCDs for DVT prophylaxis.     Discharge Planning: [facility] when ready to do so.    Bennett Manzo,    09/02/18   11:10 AM

## 2018-09-02 NOTE — THERAPY TREATMENT NOTE
Acute Care - Speech Language Pathology   Swallow Treatment Note [facility]     Patient Name: Ryan Alvarez  : 1950  MRN: [id number]  Today's Date: 2018  Onset of Illness/Injury or Date of Surgery: 18     Referring Physician: Dr. Salinas      Admit Date: 2018  Pt's lethargy conitnues, however he was noted to be more alert on this date than on last SLP visit. He completed trials of pureed, honey thick, nectar thick, and thin liquids. Delayed swallow initiation noted as well as decreased oral manipulation, however functional with consistencies provided. Questionable vocal quality change with thin liquids 1x. Pt ok for a pureed diet with nectar thick liquids when alert and upright. Ok for sips of water or ice chips between meals 30 minutes following PO intake. Meds to continue being administered crushed in pudding/applesauce. SLP will continue to follow and treat.  Elia Leigh MS CCC-SLP 2018 1:28 PM    Visit Dx:      ICD-10-CM ICD-9-CM   1. Subdural hematoma (CMS/HCC) I62.00 432.1   2. ТАТЬЯНА (acute kidney injury) (CMS/HCC) N17.9 584.9   3. Dehydration E86.0 276.51   4. Anemia, unspecified type D64.9 285.9   5. Non-traumatic rhabdomyolysis M62.82 728.88   6. Alcohol abuse F10.10 305.00   7. Fall, initial encounter W19.XXXA E888.9   8. Abrasions of multiple sites T07.XXXA 919.0   9. Elevated troponin R74.8 790.6   10. Impaired mobility and ADLs Z74.09 799.89   11. Generalized weakness R53.1 780.79   12. Oropharyngeal dysphagia R13.12 787.22     Patient Active Problem List   Diagnosis   • Subdural hematoma (CMS/HCC)   • Alcohol abuse   • ТАТЬЯНА (acute kidney injury) (CMS/HCC)   • Umbilical hernia   • Abrasions of multiple sites   • Cirrhosis of liver (CMS/HCC)   • Non-traumatic rhabdomyolysis   • Dehydration   • Elevated troponin   • Anemia       Therapy Treatment    Therapy Treatment / Health Promotion    Treatment Time/Intention  Discipline: speech language pathologist (18 1130 : Elia Leigh  ARUNA MS CCC-SLP)  Document Type: therapy note (daily note) (09/02/18 1130 : Elia Leigh, MS CCC-SLP)  Subjective Information: no complaints (09/02/18 1130 : Elia Leigh, MS CCC-SLP)  Mode of Treatment: speech-language pathology (09/02/18 1130 : Elia Leigh, MS CCC-SLP)  Patient/Family Observations: Family present (09/02/18 1130 : Elia Leigh, MS CCC-SLP)  Patient Effort: fair (09/02/18 1130 : Elia Leigh, MS CCC-SLP)  Plan of Care Review  Plan of Care Reviewed With: patient, family (09/02/18 1323 : Elia Leigh, MS CCC-SLP)    Vitals/Pain/Safety  Pain Scale: FACES Pre/Post-Treatment  Pain: FACES Scale, Pretreatment: 0-->no hurt (09/02/18 1130 : Elia Leigh MS CCC-SLP)  Pain: FACES Scale, Post-Treatment: 0-->no hurt (09/02/18 1130 : Elia Leigh, MS CCC-SLP)    Cognition, Communication, Swallow  Recommendations  Anticipated Dischage Disposition: unknown (09/02/18 1130 : Elia Leigh MS CCC-SLP)    Outcome Summary  Outcome Summary/Treatment Plan (SLP)  Daily Summary of Progress (SLP): progress toward functional goals is gradual (09/02/18 1130 : Elia Leigh MS CCC-SLP)  Barriers to Overall Progress (SLP): Lethargy (09/02/18 1130 : Elia Leigh, MS CCC-SLP)  Plan for Continued Treatment (SLP): continue to follow (09/02/18 1130 : Elia Leigh, MS CCC-SLP)  Anticipated Dischage Disposition: unknown (09/02/18 1130 : Elia Leigh MS CCC-SLP)            SLP GOALS     Row Name 09/02/18 1130 09/01/18 1415          Oral Nutrition/Hydration Goal 1 (SLP)    Oral Nutrition/Hydration Goal 1, SLP Pt will tolerate LRD w/o any overt s/s of aspiration.  -CS Pt will tolerate LRD w/o any overt s/s of aspiration.  -CS     Time Frame (Oral Nutrition/Hydration Goal 1, SLP) by discharge  -CS by discharge  -CS     Barriers (Oral Nutrition/Hydration Goal 1, SLP) cognitive status  -CS cognitive status  -CS     Progress/Outcomes (Oral Nutrition/Hydration Goal 1, SLP) continuing progress toward goal  -CS goal ongoing  -CS        User Key  (r) = Recorded By, (t) = Taken By, (c) = Cosigned By    Initials Name Provider Type    Elia Prado MS CCC-SLP Speech and Language Pathologist          EDUCATION  The patient has been educated in the following areas:   Dysphagia (Swallowing Impairment).    SLP Recommendation and Plan                       Anticipated Dischage Disposition: unknown                Plan of Care Reviewed With: patient, family  Plan of Care Review  Plan of Care Reviewed With: patient, family  Daily Summary of Progress (SLP): progress toward functional goals is gradual  Plan for Continued Treatment (SLP): continue to follow  Progress: improving  Outcome Summary: Pt's lethargy conitnues, however he was noted to be more alert on this date than on last SLP visit. He completed trials of pureed, honey thick, nectar thick, and thin liquids. Delayed swallow initiation noted as well as decreased oral manipulation, however functional with consistencies provided. Questionable vocal quality change with thin liquids 1x. Pt ok for a pureed diet with nectar thick liquids when alert and upright. Ok for sips of water or ice chips between meals 30 minutes following PO intake. Meds to continue being administered crushed in pudding/applesauce. SLP will continue to follow and treat.       SLP Outcome Measures (last 72 hours)      SLP Outcome Measures     Row Name 09/01/18 1415             SLP Outcome Measures    Outcome Measure Used? Adult NOMS  -CS         Adult FCM Scores    FCM Chosen Swallowing  -CS      Swallowing FCM Score 2  -CS        User Key  (r) = Recorded By, (t) = Taken By, (c) = Cosigned By    Initials Name Effective Dates    Elia Prado MS CCC-SLP 04/03/18 -              Time Calculation:         Time Calculation- SLP     Row Name 09/02/18 1327             Time Calculation- SLP    SLP Start Time 1130  -      SLP Stop Time 1153  -      SLP Time Calculation (min) 23 min  -      SLP Received On 09/02/18  -         User Key  (r) = Recorded By, (t) = Taken By, (c) = Cosigned By    Initials Name Provider Type    CS Elia Leigh MS CCC-SLP Speech and Language Pathologist          Therapy Charges for Today     Code Description Service Date Service Provider Modifiers Qty    98118965320 HC ST SWALLOWING CURRENT STATUS 9/1/2018 Elia Leigh MS CCC-SLP GN, CM 1    82920639851 HC ST SWALLOWING PROJECTED 9/1/2018 Elia Leigh MS CCC-SLP GN, CJ 1    85629420307 HC ST EVAL ORAL PHARYNG SWALLOW 4 9/1/2018 Elia Leigh MS CCC-SLP GN, KX 1    17621997401 HC ST TREATMENT SWALLOW 2 9/2/2018 Elia Leigh MS CCC-SLP RAAD, KX 1          SLP G-Codes  SLP NOMS Used?: Yes  Functional Limitations: Swallowing  Swallow Current Status (): At least 80 percent but less than 100 percent impaired, limited or restricted  Swallow Goal Status (): At least 20 percent but less than 40 percent impaired, limited or restricted      Elia Leigh MS CCC-ANETTE  9/2/2018

## 2018-09-02 NOTE — PLAN OF CARE
Problem: Fall Risk (Adult)  Goal: Absence of Fall  Outcome: Ongoing (interventions implemented as appropriate)      Problem: Skin Injury Risk (Adult)  Goal: Skin Health and Integrity  Outcome: Ongoing (interventions implemented as appropriate)      Problem: Patient Care Overview  Goal: Plan of Care Review   09/01/18 6287   Coping/Psychosocial   Plan of Care Reviewed With patient;family   Plan of Care Review   Progress no change   OTHER   Outcome Summary Pt sat up in chair this am and answered questions, he was drowsy his afternoon. Continue to turn and changed dressing today. Pt got choked this afternoon and ST reevaluation was done. pt was drowsy and ST will reeval in am. Bedcheck on and family at bedside.       Problem: Alcohol Withdrawal Acute, Risk/Actual (Adult)  Goal: Signs and Symptoms of Listed Potential Problems Will be Absent, Minimized or Managed (Alcohol Withdrawal Acute, Risk/Actual)  Outcome: Ongoing (interventions implemented as appropriate)      Problem: Nutrition, Imbalanced: Inadequate Oral Intake (Adult)  Goal: Improved Oral Intake  Outcome: Ongoing (interventions implemented as appropriate)      Problem: Brain Injury, Moderate Traumatic (GCS 9-12) (Adult)  Goal: Signs and Symptoms of Listed Potential Problems Will be Absent, Minimized or Managed (Brain Injury, Moderate Traumatic)  Outcome: Ongoing (interventions implemented as appropriate)

## 2018-09-02 NOTE — PROGRESS NOTES
Ryan Alvarez  68 y.o.    Subjective    Daughter present at bedside    Temp:  [96.7 °F (35.9 °C)-98.9 °F (37.2 °C)] 96.7 °F (35.9 °C)  Heart Rate:  [52-95] 57  Resp:  [16-18] 16  BP: ()/(38-68) 99/51      Objective    Neurologic Exam    Lethargic  Opens eyes to voice  Oriented x 3   Dysarthric speech  Follows commands  SINGH    Principal Problem:    Subdural hematoma (CMS/HCC)  Active Problems:    Alcohol abuse    ТАТЬЯНА (acute kidney injury) (CMS/HCC)    Umbilical hernia    Abrasions of multiple sites    Cirrhosis of liver (CMS/HCC)    Non-traumatic rhabdomyolysis    Dehydration    Elevated troponin    Anemia      Lab Results (last 24 hours)     Procedure Component Value Units Date/Time    Anaerobic Culture - Pleural Fluid, Pleural Cavity [[id number]]  (Normal) Collected:  08/30/18 1342    Specimen:  Pleural Fluid from Pleural Cavity Updated:  09/02/18 1033     Culture No anaerobes isolated at 3 days    Body Fluid Culture - Body Fluid, Pleural Cavity [[id number]]  (Normal) Collected:  08/30/18 1342    Specimen:  Body Fluid from Pleural Cavity Updated:  09/02/18 0736     BF Culture No growth at 3 days     Gram Stain Result Moderate (3+) WBCs seen      No organisms seen    Urine Culture - Urine, [[id number]]  (Normal) Collected:  09/01/18 1834    Specimen:  Urine from Urine, Catheter Updated:  09/02/18 0712     Urine Culture No growth at 24 hours    Basic Metabolic Panel [[id number]]  (Abnormal) Collected:  09/02/18 0512    Specimen:  Blood Updated:  09/02/18 0611     Glucose 85 mg/dL      BUN 33 (H) mg/dL      Creatinine 1.72 (H) mg/dL      Sodium 145 mmol/L      Potassium 4.8 mmol/L      Chloride 114 (H) mmol/L      CO2 21.0 (L) mmol/L      Calcium 8.9 mg/dL      eGFR Non African Amer 40 (L) mL/min/1.73      BUN/Creatinine Ratio 19.2     Anion Gap 10.0 mmol/L     Narrative:       GFR Normal >60  Chronic Kidney Disease <60  Kidney Failure <15    Urinalysis With Culture If Indicated - Urine, Catheter [[id number]]   (Abnormal) Collected:  09/01/18 1834    Specimen:  Urine from Urine, Catheter Updated:  09/01/18 1916     Color, UA Dark Yellow (A)     Appearance, UA Cloudy (A)     pH, UA <=5.0     Specific Gravity, UA 1.024     Glucose, UA Negative     Ketones, UA Trace (A)     Bilirubin, UA Small (1+) (A)     Blood, UA Large (3+) (A)     Protein, UA 30 mg/dL (1+) (A)     Leuk Esterase, UA Moderate (2+) (A)     Nitrite, UA Negative     Urobilinogen, UA 0.2 E.U./dL    Urinalysis, Microscopic Only - Urine, Clean Catch [[id number]]  (Abnormal) Collected:  09/01/18 1834    Specimen:  Urine from Urine, Catheter Updated:  09/01/18 1916     RBC, UA 3-5 (A) /HPF      WBC, UA 6-12 (A) /HPF      Bacteria, UA 1+ (A) /HPF      Squamous Epithelial Cells, UA 3-6 (A) /HPF      Yeast, UA Small/1+ Budding Yeast /HPF      Hyaline Casts, UA 13-20 /LPF      Granular Casts, UA 7-12 /LPF      Methodology Manual Light Microscopy           Xr Spine Thoracic 2 View    Result Date: 8/27/2018  Narrative: XR SPINE THORACIC 2 VW- 8/27/2018 6:04 PM CDT  HISTORY: fall; I62.00-Nontraumatic subdural hemorrhage, unspecified; N17.9-Acute kidney failure, unspecified; E86.0-Dehydration; D64.9-Anemia, unspecified; M62.82-Rhabdomyolysis; F10.10-Alcohol abuse, uncomplicated; W19.XXXA-Unspecified fall, initial encounter; T07.XXXA-Unspecified multiple injuries, initial encounter; R74.8-Abnormal levels of other serum enzymes; Z74.09-Other reduced mobility; R53.1-Weak  COMPARISON: None  FINDINGS: Frontal, lateral and swimmers lateral radiographs of the thoracic spine demonstrate normal alignment without evidence of compression fracture or subluxation. The pedicles are intact. Prominent anterior hypertrophic degenerative changes noted in the mid and lower thoracic spine. Ossification anterior longitudinal ligament is noted.  The visualized thorax is clear.      Impression: 1. Hypertrophic degenerative spondylosis is noted throughout the thoracic spine. No acute compression  deformity is identified..  This report was finalized on 08/27/2018 18:36 by Dr. Tacho Frias MD.    Xr Shoulder 2+ View Left    Result Date: 8/26/2018  Narrative: EXAM: XR SHOULDER 2+ VW LEFT- - 8/26/2018 9:57 AM CDT  HISTORY: pain after fall; I62.00-Nontraumatic subdural hemorrhage, unspecified; N17.9-Acute kidney failure, unspecified; E86.0-Dehydration; D64.9-Anemia, unspecified; M62.82-Rhabdomyolysis; F10.10-Alcohol abuse, uncomplicated; W19.XXXA-Unspecified fall, initial encounter; T07.XXXA-Unspecified multiple injuries, initial encounter; R74.8-Abnormal levels of other serum enzymes   COMPARISON: None.  TECHNIQUE:  2 images.  Portable AP internal rotation and AP external rotation views of the left shoulder.  FINDINGS:  Limited assessment of alignment on provided views. No displaced fracture. No aggressive bony lesion. Degenerative changes at the acromioclavicular joint. Apparent superior migration of the humeral head may indicate chronic rotator cuff injury.       Impression: 1. Limited assessment of alignment on portable views. 2. No acute bony finding. 3. Apparent superior migration of the humeral head may indicate chronic rotator cuff injury or this finding could be positional. This report was finalized on 08/26/2018 11:53 by Dr Tabatha Pacheco MD.    Ct Head Without Contrast    Result Date: 8/31/2018  Narrative: EXAM: CT OF THE HEAD WITHOUT IV CONTRAST 8/31/2018.  COMPARISON: Head CT dated 8/29/2018  INDICATION: Male, 68 years-old. Subdural hemorrhage  PROCEDURE: Non contrast enhanced head CT was performed.  Radiation dose equals  mGy-cm.  Automated exposure control dose reduction technique was implemented.  FINDINGS:  Since most recent head CT dated 8/29/2018, there has been no significant interval change in the right near hemispheric subdural hematoma. Midline shift to the left is unchanged, approximately 3 mm. Right frontal cavernous malformation unchanged. No new focus of intracranial  hemorrhage. No acute hydrocephalus.      Impression: No significant interval changes. This report was finalized on 08/31/2018 13:28 by Dr. Stefani Dnuaway MD.    Ct Head Without Contrast    Result Date: 8/29/2018  Narrative: EXAMINATION: CT HEAD WO CONTRAST-   8/29/2018 9:12 AM CDT  HISTORY: sdh; I62.00-Nontraumatic subdural hemorrhage, unspecified; N17.9-Acute kidney failure, unspecified; E86.0-Dehydration; D64.9-Anemia, unspecified; M62.82-Rhabdomyolysis; F10.10-Alcohol abuse, uncomplicated; W19.XXXA-Unspecified fall, initial encounter; T07.XXXA-Unspecified multiple injuries, initial encounter; R74.8-Abnormal levels of other serum enzymes; Z74.09-Other reduced mobility; R53.1-Weakn  In order to have a CT radiation dose as low as reasonably achievable Automated Exposure Control was utilized for adjustment of the mA and/or KV according to patient size.  DLP in mGycm= 690.  Noncontrast head CT compared with 08/27/2018.  Stable mildly hyperdense right frontal subdural hemorrhage measuring approximately 6.4 x 4.7 cm in diameter and 1.3 cm in thickness. Stable mild localized mass effect.  Unchanged right frontal lobe hyperdense focus with focal calcification.  Stable ventricle size.  No new or increased abnormality.  Summary: 1. No change.            This report was finalized on 08/29/2018 09:33 by Dr. Tyler Stanley MD.    Ct Head Without Contrast    Result Date: 8/27/2018  Narrative: CT HEAD WO CONTRAST- 8/27/2018 4:44 AM CDT  HISTORY: sdh; I62.00-Nontraumatic subdural hemorrhage, unspecified; N17.9-Acute kidney failure, unspecified; E86.0-Dehydration; D64.9-Anemia, unspecified; M62.82-Rhabdomyolysis; F10.10-Alcohol abuse, uncomplicated; W19.XXXA-Unspecified fall, initial encounter; T07.XXXA-Unspecified multiple injuries, initial encounter; R74.8-Abnormal levels of other serum enzymes   DOSE LENGTH PRODUCT: 1236 mGy cm. Automated exposure control was also utilized to decrease patient radiation dose.  Technique: Axial  CT of the brain without IV contrast. Sagittal and coronal reformations are also provided for review. Soft tissue and bone kernels are available for interpretation.  Comparison: CT scan dated 8/26/2018.  Findings:  Stable mixed-density right cerebral convexity subdural hematoma measuring up to 13 mm in thickness (series 5-image 24). A 2.6 cm hyperdensity is noted in the paramedian posterior right frontal lobe (series 7-image 27). There are speckled areas of dense calcification more inferiorly along this hyperdensity. There does not appear to be obvious volume loss in this area to suggest an old injury. No mass effect or adjacent vasogenic edema.  There is no evidence of acute large vascular distribution infarct.  The ventricles, cortical sulci and basal cisterns are symmetric and age appropriate.  Normal brainstem and posterior fossa.  The scalp and calvarium are unremarkable. Visualized paranasal sinuses and mastoids are clear.      Impression: Impression:  1. Stable mixed-density (acute on chronic) right convexity subdural hematoma. No significant mass effect. 2.  Stable 2.6 cm intraparenchymal hyperdensity in the paramedian posterior right frontal lobe. This does not appear acute, unlikely acute intracranial hemorrhage. Appearance would be unusual for neoplasm. Consider MRI. This report was finalized on 08/27/2018 07:33 by Dr Jaquan Roth, .    Ct Head Without Contrast    Result Date: 8/26/2018  Narrative: EXAM: CT HEAD WO CONTRAST- - 8/26/2018 8:13 AM CDT  HISTORY: Subdural hematoma; I62.00-Nontraumatic subdural hemorrhage, unspecified; N17.9-Acute kidney failure, unspecified; E86.0-Dehydration; D64.9-Anemia, unspecified; M62.82-Rhabdomyolysis; F10.10-Alcohol abuse, uncomplicated; W19.XXXA-Unspecified fall, initial encounter; T07.XXXA-Unspecified multiple injuries, initial encounter; R74.8-Abnormal levels of other serum enzymes   COMPARISON: 8/25/2018.  DOSE LENGTH PRODUCT: 715 mGy cm. Automated exposure control  was also utilized to decrease patient radiation dose.  TECHNIQUE: Unenhanced axial CT images obtained from vertex to skull base.  FINDINGS: Redemonstration of right frontoparietal extra-axial hematoma measuring 14 mm in thickness. No significant change. Similar mild mass effect on the adjacent sulci. No midline shift.  Similar hyperdensity at the right frontal lobe with small associated calcifications.  Ventricles, remaining sulci and basilar cisterns within normal limits. Midline structures anatomic.  Globes, retrobulbar soft tissues, paranasal sinuses, mastoid air cells and external auditory canals are within normal limits. No depressed skull fracture. Similar mild swelling of the right frontoparietal scalp.      Impression: 1. Similar size of right frontoparietal extra-axial hematoma. No midline shift. 2. Similar hyperdensity of the right frontal lobe. This report was finalized on 08/26/2018 10:50 by Dr Tabatha Pacheco MD.    Ct Head Without Contrast    Result Date: 8/26/2018  Narrative: EXAM: CT HEAD WO CONTRAST- - 8/25/2018 11:18 PM CDT  HISTORY: etoh fall   COMPARISON: Subsequent exam 8/26/2018.  DOSE LENGTH PRODUCT: 783 mGy cm. Automated exposure control was also utilized to decrease patient radiation dose.  TECHNIQUE: Unenhanced axial CT images obtained from vertex to skull base. Preliminary interpretation performed by stat vero 8/25/2018 at 2348 hours.  FINDINGS: There is hyperdense extra-axial fluid collection at the right frontoparietal convexity, measuring 14 mm in thickness and up to 7.8 cm in AP dimension on axial image 20. Mild mass effect on the adjacent sulci. No midline shift.  Hyperdensity with possible small calcification at the superior right frontal lobe cortex and subcortical white matter.  Ventricles, sulci and basilar cisterns within normal limits. Midline structures anatomic.  Globes, retrobulbar soft tissues, paranasal sinuses, mastoid air cells and external auditory canals are within  normal limits. Calvarium appears intact. Thickening of the right frontoparietal subcutaneous tissues, likely hematoma.      Impression: 1. Right frontoparietal extra-axial hematoma measuring 14 mm in thickness. Mild mass effect on the adjacent sulci. No midline shift. 2. Hyperdensity at the high right frontal lobe of uncertain chronicity, could represent parenchymal hemorrhage, sequelae of prior infection or vascular anomaly. This report was finalized on 08/26/2018 10:47 by Dr Tabatha Pacheco MD.    Ct Chest Without Contrast    Result Date: 8/29/2018  Narrative: EXAMINATION: CT CHEST WO CONTRAST-   8/29/2018 10:59 AM CDT  HISTORY: opacification of the right lung on cxr; I62.00-Nontraumatic subdural hemorrhage, unspecified; N17.9-Acute kidney failure, unspecified; E86.0-Dehydration; D64.9-Anemia, unspecified; M62.82-Rhabdomyolysis; F10.10-Alcohol abuse, uncomplicated; W19.XXXA-Unspecified fall, initial encounter; T07.XXXA-Unspecified multiple injuries, initial encounter; R74.8-Abnormal levels of other serum enzymes; Z74.09-  In order to have a CT radiation dose as low as reasonably achievable Automated Exposure Control was utilized for adjustment of the mA and/or KV according to patient size.  DLP in mGycm= 280.  Noncontrast chest CT compared with chest x-ray from 6:44 AM.  Large right pleural effusion completely fills the right hemithorax.  The right lung is compressed down into the perihilar region.  Cardiomegaly with coronary artery calcification.  Adequately expanded left lung. Trace amount of left pleural fluid and patchy alveolar infiltrate is seen within the left upper lobe.  Prominent diffuse thoracic spurring.  Ascites noted within the upper abdomen. Bilateral gynecomastia is present.  Summary: 1. Large right pleural effusion with complete compression of the right lung. 2. Trace left pleural fluid with patchy left upper lobe infiltrate compatible with pneumonia. 3. Cardiomegaly.            This report was  finalized on 08/29/2018 11:17 by Dr. Tyler Stanley MD.    Ct Cervical Spine Without Contrast    Result Date: 8/27/2018  Narrative: EXAMINATION:   CT CERVICAL SPINE WO CONTRAST-  8/27/2018 6:33 PM CDT  HISTORY: CT CERVICAL SPINE without contrast dated 8/27/2018 6:13 PM CDT  HISTORY: fall; I62.00-Nontraumatic subdural hemorrhage, unspecified; N17.9-Acute kidney failure, unspecified; E86.0-Dehydration; D64.9-Anemia, unspecified; M62.82-Rhabdomyolysis; F10.10-Alcohol abuse, uncomplicated; W19.XXXA-Unspecified fall, initial encounter; T07.XXXA-Unspecified multiple injuries, initial encounter; R74.8-Abnormal levels of other serum enzymes; Z74.09-Other reduced mobility; R53.1-Weak  COMPARISON: None  DOSE LENGTH PRODUCT: 302 mGy cm  TECHNIQUE: Serial helical tomographic images of the cervical spine were obtained without the use of intravenous contrast. Additionally, sagittal and coronal reformatted images were also provided for review.  FINDINGS: Multilevel degenerative changes are seen in the cervical spine. Loss of height of the C4-5 C5-6 and C6-7 disc space levels. Uncinate spurring is present bilaterally to C5-6 and C6-7 levels.  The odontoid process is intact. There is hypertrophic degenerative change in the posterior facets the C2-3 level  Ossification of the nuchal ligament is noted.   There is no evidence of acute fracture or subluxation. The prevertebral soft tissues are within normal limits. The posterior elements are intact. Vertebral body heights are maintained.  The visualized skull base is intact. The visualized thorax demonstrates no acute abnormality.      Impression: 1. Degenerative changes in the cervical spine without evidence of acute osseous injury.  This report was finalized on 08/27/2018 18:35 by Dr. Tacho Frias MD.    Mri Brain With & Without Contrast    Result Date: 8/30/2018  Narrative: EXAM: MR BRAIN WITHOUT AND WITH IV CONTRAST 8/30/2018  COMPARISON: Head CT dated 8/29/2018  HISTORY: 68  years-old Male. Right frontal calcification; I62.00-Nontraumatic subdural hemorrhage, unspecified; N17.9-Acute kidney failure, unspecified; E86.0-Dehydration; D64.9-Anemia, unspecified; M62.82-Rhabdomyolysis; F10.10-Alcohol abuse, uncomplicated; W19.XXXA-Unspecified fall, initial encounter; T07.XXXA-Unspecified multiple injuries, initial encounter; R74.8-Abnormal levels of other serum enzymes; Z74.09-Other reduce  TECHNIQUE: Routine pulse sequences were obtained of the brain before and after the administration of IV contrast.  REPORT:  There is a near right hemispheric subdural hematoma, with maximum thickness of 1.5 cm. 3 mm right to left midline shift. There is no evidence of intraventricular hemorrhage.  The previously seen hyperdensity in head CT centered in the right superior frontal gyrus corresponds with a cavernous malformation measuring 2 cm. There is no associated T2/FLAIR abnormal signal to suggest recent hemorrhage.   There is no diffusion restriction within the brain parenchyma to suggest acute infarct. No acute hydrocephalus.      Impression: 1.  known-right right near hemispheric subdural hematoma. 2.  Right frontal cavernous malformation. This report was finalized on 08/30/2018 15:15 by Dr. Stefani Dunaway MD.    Us Chest    Result Date: 8/30/2018  Narrative: DATE OF PROCEDURE:  8/30/2018.  PREPROCEDURE DIAGNOSIS:  Right pleural effusion. POSTPROCEDURE DIAGNOSIS:  Right pleural effusion.      INDICATIONS FOR PROCEDURE: Dyspnea  PROCEDURE: 1. Thoracentesis, diagnostic and therapeutic. 2. Ultrasound guidance for thoracentesis, imaging supervision and interpretation.  ANESTHESIA: 1% lidocaine, injected locally.      COMPLICATIONS: None.    EXAMINATIONS FOR COMPARISON:  CT chest dated 8/29/2018.  DESCRIPTION OF PROCEDURE: The risk and benefits of the procedure were explained to the patient. Specifically, the risks of bleeding, infection, pneumothorax (possible thoracostomy tube), and damage to  surrounding structures were discussed extensively. The patient's questions were answered. The patient opted to proceed. Written and verbal consent were obtained from the patient.  TIME OUT was taken and the patient's name, medical record number, date of birth, procedure, entry site, and listen allergies were confirmed. The site of the procedure was confirmed and marked.  The rightposterior thorax was prepped and draped in sterile fashion. The area was anesthetized with buffered lidocaine 2%.  A 5 Welsh 10 cm  catheter was inserted into the pleural effusion  using ultrasound guidance. Approximately 1500 mL of tea colored fluid was recovered and sent to the pathology lab for analysis.  The patient tolerated the procedure well.   No immediate complications were noted.      Impression: Successful ultrasound guided rightthoracentesis.. No immediate complications were noted.     This report was finalized on 08/30/2018 14:34 by Dr. Stefani Dunaway MD.    Xr Chest 1 View    Result Date: 8/29/2018  Narrative: EXAM: XR CHEST 1 VW- - 8/29/2018 6:49 AM CDT  HISTORY: Wheezing; I62.00-Nontraumatic subdural hemorrhage, unspecified; N17.9-Acute kidney failure, unspecified; E86.0-Dehydration; D64.9-Anemia, unspecified; M62.82-Rhabdomyolysis; F10.10-Alcohol abuse, uncomplicated; W19.XXXA-Unspecified fall, initial encounter; T07.XXXA-Unspecified multiple injuries, initial encounter; R74.8-Abnormal levels of other serum enzymes; Z74.09-Other reduced mobility; R53.1-   COMPARISON: None.  TECHNIQUE:  1 images.  Frontal view of the chest  FINDINGS:  Complete opacification of the right lung field. Left lung appears clear without pneumothorax, pleural effusion or focal consolidation. Right cardiac and mediastinal silhouette is obscured, the left appears within normal limits. No acute findings of the visualized bones or upper abdomen. Small calcification at the left neck may represent carotid artery atherosclerosis.       Impression: 1.  Complete opacification of the right lung. This could be due to infiltrate, pleural effusion, hemothorax, and/or mass. 2. Left lung clear. 3. Possible left calcified carotid artery atherosclerosis. This report was finalized on 08/29/2018 07:05 by Dr Tabatha Pacheco MD.    Xr Pelvis 1 Or 2 View    Result Date: 8/26/2018  Narrative: EXAM: XR PELVIS 1 OR 2 VW- - 8/25/2018 11:31 PM CDT  HISTORY: fall   COMPARISON: None.  TECHNIQUE:  1 images.  Frontal view of the pelvis  FINDINGS:  No displaced fracture or aggressive bony lesion. Degenerative changes at the bilateral hips. Sacroiliac joints and pubic symphysis anatomic. Sacral arcuate lines appear intact. Degenerative changes at the lumbosacral spine. Pelvic phleboliths. Vascular calcifications. Surgical clips overlie crying soft tissues, possibly vasectomy.       Impression: 1. No acute bony finding. 2. Vascular calcifications. This report was finalized on 08/26/2018 11:44 by Dr Tabatha Pacheco MD.    Us Renal Bilateral    Result Date: 8/27/2018  Narrative: EXAM:  US RENAL BILATERAL-  INDICATION:  Acute renal failure  COMPARISON:  None  TECHNIQUE:  Routine grayscale and color Doppler images were obtained through the bilateral renal fossae.  FINDINGS:   Right Kidney: The right kidney measures 10.9 cm in longitudinal dimension. There is good corticomedullary differentiation. There is no evidence of hydronephrosis. There are no cystic lesions or masses. No echogenic stone.  Left Kidney: The left kidney measures 10.1 cm in longitudinal dimension. There is good corticomedullary differentiation. There is no evidence of hydronephrosis. There are no cystic lesions or masses. No echogenic stone.  Bladder: The bladder is unremarkable without evidence of mass or internal debris. Ascites    Impression: 1.  Unremarkable appearance of the kidneys. 2.  Ascites.  This report was finalized on 08/27/2018 15:55 by Dr. Stefani Dunaway MD.    Us Thoracentesis    Result Date:  8/30/2018  Narrative: DATE OF PROCEDURE:  8/30/2018.  PREPROCEDURE DIAGNOSIS:  Right pleural effusion. POSTPROCEDURE DIAGNOSIS:  Right pleural effusion.      INDICATIONS FOR PROCEDURE: Dyspnea  PROCEDURE: 1. Thoracentesis, diagnostic and therapeutic. 2. Ultrasound guidance for thoracentesis, imaging supervision and interpretation.  ANESTHESIA: 1% lidocaine, injected locally.      COMPLICATIONS: None.    EXAMINATIONS FOR COMPARISON:  CT chest dated 8/29/2018.  DESCRIPTION OF PROCEDURE: The risk and benefits of the procedure were explained to the patient. Specifically, the risks of bleeding, infection, pneumothorax (possible thoracostomy tube), and damage to surrounding structures were discussed extensively. The patient's questions were answered. The patient opted to proceed. Written and verbal consent were obtained from the patient.  TIME OUT was taken and the patient's name, medical record number, date of birth, procedure, entry site, and listen allergies were confirmed. The site of the procedure was confirmed and marked.  The rightposterior thorax was prepped and draped in sterile fashion. The area was anesthetized with buffered lidocaine 2%.  A 5 Pashto 10 cm  catheter was inserted into the pleural effusion  using ultrasound guidance. Approximately 1500 mL of tea colored fluid was recovered and sent to the pathology lab for analysis.  The patient tolerated the procedure well.   No immediate complications were noted.      Impression: Successful ultrasound guided rightthoracentesis.. No immediate complications were noted.     This report was finalized on 08/30/2018 14:34 by Dr. Stefani Dunaway MD.    Xr Chest Ap    Result Date: 8/30/2018  Narrative: XR CHEST AP- 8/30/2018 2:06 PM CDT  HISTORY: Post thoracentesis of the RIGHT pleural effusion  COMPARISON: 8/29/2018.  FINDINGS: The RIGHT pleural effusion has significantly improved. There is no evidence of pneumothorax. The LEFT lung is stable. The cardiomediastinal  silhouette and pulmonary vascularity are unchanged.  The osseous structures and surrounding soft tissues demonstrate no acute abnormality.      Impression: 1. No pneumothorax after RIGHT thoracentesis.   This report was finalized on 08/30/2018 14:14 by Dr. Brenton Horne MD.    Xr Spine Lumbar Ap & Lateral    Result Date: 8/27/2018  Narrative: EXAMINATION:   XR SPINE LUMBAR AP AND LATERAL-  8/27/2018 6:37 PM CDT  HISTORY: Patient fell  3 views the lumbar spine are obtained. Lumbar vertebra are relatively aligned. Loss height of the L4-5 disc.  Prominent hypertrophic in   syndesmophyte formations present at L1-2 and L2-3 levels. Hypertrophic degenerative changes present the L4-5 level. Anterior hypertrophic osteophytes are present the L1-2, L2-3 and L3-4 levels.  SI joints are normal.  IMPRESSION hypertrophic degenerative changes noted throughout the lumbar spine. This report was finalized on 08/27/2018 18:38 by Dr. Tacho Frias MD.        Plan:     68-year-old male with history of alcohol abuse and liver cirrhosis found down after 3 days     1) Right subdural hematoma - wax/wane mental status during hospital course, but imaging shows stable hematoma size.  If subdural hematoma enlarges and causes neurologic deficit, discussed with patient and he wishes to proceed with surgery but as last resort.  At this time, hopefully the subdural hematoma will improve without surgical intervention given liver cirrhosis, acute kidney injury, rhabdomyolysis.  Encephalopathy likely multifactorial with multiple medical comorbidities.      2) Right frontal lobe calcification with intraparenchymal hemorrhage - MRI shows cavernous malformation     3)  Reviewed imaging with family, all questions answered    Alireza Salinas MD

## 2018-09-02 NOTE — THERAPY TREATMENT NOTE
Acute Care - Physical Therapy Treatment Note  [facility]     Patient Name: Ryan Alvarez  : 1950  MRN: [id number]  Today's Date: 2018  Onset of Illness/Injury or Date of Surgery: 18  Date of Referral to PT: 18  Referring Physician: Dr. Salinas    Admit Date: 2018    Visit Dx:    ICD-10-CM ICD-9-CM   1. Subdural hematoma (CMS/HCC) I62.00 432.1   2. ТАТЬЯНА (acute kidney injury) (CMS/HCC) N17.9 584.9   3. Dehydration E86.0 276.51   4. Anemia, unspecified type D64.9 285.9   5. Non-traumatic rhabdomyolysis M62.82 728.88   6. Alcohol abuse F10.10 305.00   7. Fall, initial encounter W19.XXXA E888.9   8. Abrasions of multiple sites T07.XXXA 919.0   9. Elevated troponin R74.8 790.6   10. Impaired mobility and ADLs Z74.09 799.89   11. Generalized weakness R53.1 780.79   12. Oropharyngeal dysphagia R13.12 787.22     Patient Active Problem List   Diagnosis   • Subdural hematoma (CMS/HCC)   • Alcohol abuse   • ТАТЬЯНА (acute kidney injury) (CMS/HCC)   • Umbilical hernia   • Abrasions of multiple sites   • Cirrhosis of liver (CMS/HCC)   • Non-traumatic rhabdomyolysis   • Dehydration   • Elevated troponin   • Anemia       Therapy Treatment          Rehabilitation Treatment Summary     Row Name 18 1057             Treatment Time/Intention    Discipline physical therapy assistant  -TR      Document Type therapy note (daily note)  -TR      Subjective Information no complaints  -TR      Mode of Treatment physical therapy  -TR2      Patient/Family Observations Daughter in room  -TR2      Total Minutes, Physical Therapy Treatment 32  -TR2      Patient Effort adequate  -TR2      Comment Pt very lethargic initally, constant cues to wake up to particpate.   -TR2      Existing Precautions/Restrictions fall  -TR2      Recorded by [TR] Aysha Tineo, PTA 18 1058  [TR2] Aysha Tineo, PTA 18 1138      Row Name 18 1057             Cognitive Assessment/Intervention- PT/OT    Orientation  Status (Cognition) oriented to;person;disoriented to;place;situation;time;verbal cues/prompts needed for orientation  -TR      Follows Commands (Cognition) follows two step commands;50-74% accuracy;delayed response/completion  -TR      Safety Deficit (Cognitive) impulsivity  -TR      Personal Safety Interventions elopement precautions initiated;fall prevention program maintained;gait belt;nonskid shoes/slippers when out of bed  -TR      Recorded by [TR] Aysha Tineo, [facility] 09/02/18 1138      Row Name 09/02/18 1057             Bed Mobility Assessment/Treatment    Scooting/Bridging [place] (Bed Mobility) verbal cues;maximum assist (25% patient effort);2 person assist  -TR      Supine-Sit [place] (Bed Mobility) verbal cues;maximum assist (25% patient effort);2 person assist  -TR      Sit-Supine [place] (Bed Mobility) verbal cues;maximum assist (25% patient effort);2 person assist  -TR      Bed Mobility, Safety Issues decreased use of legs for bridging/pushing;cognitive deficits limit understanding;decreased use of arms for pushing/pulling  -TR      Assistive Device (Bed Mobility) bed rails;head of bed elevated  -TR      Recorded by [TR] Aysha Tineo, PTA 09/02/18 1138      Row Name 09/02/18 1057             Sit-Stand Transfer    Sit-Stand [place] (Transfers) moderate assist (50% patient effort);maximum assist (25% patient effort);2 person assist  -TR      Assistive Device (Sit-Stand Transfers) walker, front-wheeled  -TR      Recorded by [TR] Aysha Tineo, PTA 09/02/18 1138      Row Name 09/02/18 1057             Stand-Sit Transfer    Stand-Sit [place] (Transfers) verbal cues;maximum assist (25% patient effort);2 person assist  -TR      Assistive Device (Stand-Sit Transfers) walker, front-wheeled  -TR      Recorded by [TR] Aysha Tineo, [facility] 09/02/18 1138      Row Name 09/02/18 1057             Gait/Stairs Assessment/Training    Gait/Stairs Assessment/Training  gait/ambulation independence  -TR      [place] Level (Gait) moderate assist (50% patient effort);2 person assist  -TR      Assistive Device (Gait) walker, front-wheeled  -TR      Distance in Feet (Gait) shuffle towards head of bed   -TR      Recorded by [TR] Aysha Tineo, [facility] 09/02/18 1138      Row Name 09/02/18 1057             General ROM    GENERAL ROM COMMENTS Attempted AROM on BLE; however PROM x5 performed in all planes.   -TR      Recorded by [TR] Aysha Tineo, [facility] 09/02/18 1138      Row Name 09/02/18 1057             Static Sitting Balance    Level of [place] (Unsupported Sitting, Static Balance) contact guard assist  -TR      Sitting Position (Unsupported Sitting, Static Balance) sitting on edge of bed  -TR      Recorded by [TR] Aysha Tineo, [facility] 09/02/18 1138      Row Name 09/02/18 1057             Dynamic Sitting Balance    Level of [place], Reaches Outside Midline (Sitting, Dynamic Balance) contact guard assist;minimal assist, 75% patient effort  -TR      Sitting Position, Reaches Outside Midline (Sitting, Dynamic Balance) sitting on edge of bed  -TR      Recorded by [TR] Aysha Tineo, [facility] 09/02/18 1138      Row Name 09/02/18 1057             Static Standing Balance    Level of [place] (Supported Standing, Static Balance) minimal assist, 75% patient effort  -TR      Comment (Supported Standing, Static Balance) Use of RW  -TR      Recorded by [TR] Aysha Tineo, [facility] 09/02/18 1138      Row Name 09/02/18 1057             Positioning and Restraints    Pre-Treatment Position in bed  -TR      Post Treatment Position bed  -TR      In Bed fowlers;call light within reach;encouraged to call for assist;exit alarm on;with family/caregiver;side rails up x2;LUE elevated;RUE elevated  -TR      Recorded by [TR] Aysha Tineo, [facility] 09/02/18 1138      Row Name 09/02/18 1057             Pain Scale: FACES Pre/Post-Treatment    Pain: FACES Scale, Pretreatment  0-->no hurt  -TR      Pain: FACES Scale, Post-Treatment 0-->no hurt  -TR      Recorded by [TR] NomanAysha adams, PTA 09/02/18 1138      Row Name                Wound 08/26/18 0213 Left  abrasion    Wound - Properties Group Date first assessed: 08/26/18 [SB] Time first assessed: 0213 [SB] Present On Admission : yes [SB] Side: Left [SB] Type: abrasion [SB] Recorded by:  [SB] Corrina Dobbs RN 08/26/18 0341    Row Name                Wound 08/26/18 0213 Left knee abrasion    Wound - Properties Group Date first assessed: 08/26/18 [SB] Time first assessed: 0213 [SB] Side: Left [SB] Location: knee [SB] Type: abrasion [SB] Recorded by:  [SB] Corrina Dobbs RN 08/26/18 0344    Row Name                Wound 08/28/18 2000 Left ankle abrasion    Wound - Properties Group Date first assessed: 08/28/18 [AR] Time first assessed: 2000 [AR] Present On Admission : yes [AR] Side: Left [AR] Location: ankle [AR] Type: abrasion [AR] Recorded by:  [AR] Glory Castorena RN 08/29/18 0104    Row Name                Wound 08/28/18 2000 Left heel abrasion    Wound - Properties Group Date first assessed: 08/28/18 [AR] Time first assessed: 2000 [AR] Present On Admission : yes [AR] Side: Left [AR] Location: heel [AR] Type: abrasion [AR] Recorded by:  [AR] Glory Castorena RN 08/29/18 0105    Row Name                Wound 08/28/18 2000 Right ankle abrasion    Wound - Properties Group Date first assessed: 08/28/18 [AR] Time first assessed: 2000 [AR] Present On Admission : yes [AR] Side: Right [AR] Location: ankle [AR] Type: abrasion [AR] Recorded by:  [AR] Glory Castorena RN 08/29/18 0106    Row Name                Wound 08/28/18 2000 Right heel abrasion    Wound - Properties Group Date first assessed: 08/28/18 [AR] Time first assessed: 2000 [AR] Present On Admission : yes [AR] Side: Right [AR] Location: heel [AR] Type: abrasion [AR] Recorded by:  [AR] Glory Castorena RN 08/29/18 0107    Row Name                Wound  08/31/18 2000 Left upper back abrasion    Wound - Properties Group Date first assessed: 08/31/18 [FI] Time first assessed: 2000 [FI] Present On Admission : no [FI] Side: Left [FI] Orientation: upper [FI] Location: back [FI] Type: abrasion [FI2] Recorded by:  [FI] Dina Thomson, RNA 08/31/18 2152 [FI2] Dina Thomson, RNA 08/31/18 2155    Row Name                Wound 08/31/18 2000 lower back abrasion    Wound - Properties Group Date first assessed: 08/31/18 [AR] Time first assessed: 2000 [AR] Present On Admission : picture taken [AR] Orientation: lower [AR] Location: back [AR] Type: abrasion [AR] Recorded by:  [AR] Glory Castorena RN 09/01/18 0444    Row Name                Wound 08/31/18 2000 Right hip abrasion    Wound - Properties Group Date first assessed: 08/31/18 [AR] Time first assessed: 2000 [AR] Present On Admission : picture taken [AR] Side: Right [AR] Location: hip [AR] Type: abrasion [AR] Recorded by:  [AR] Glory Castorena RN 09/01/18 0447      User Key  (r) = Recorded By, (t) = Taken By, (c) = Cosigned By    Initials Name Effective Dates Discipline    AR Glory Castorena RN 08/02/16 -  Nurse    Corrina Xie RN 12/15/17 -  Nurse    Aysha Ruby PTA 08/02/16 -  PT    FI Dina Thomson, RNA 01/16/18 -  Nurse          Wound 08/26/18 0213 Left  abrasion (Active)   Dressing Appearance dried drainage 9/1/2018  8:00 PM   Drainage Amount small 9/1/2018  8:00 PM   Dressing Care, Wound open to air 9/1/2018 12:45 PM       Wound 08/26/18 0213 Left knee abrasion (Active)   Dressing Appearance open to air 9/1/2018  8:00 PM   Drainage Amount none 9/1/2018  8:00 PM   Dressing Care, Wound dressing applied;foam 9/1/2018 12:45 PM       Wound 08/28/18 2000 Left ankle abrasion (Active)   Dressing Appearance dry;intact 9/1/2018  8:00 PM   Drainage Amount scant 9/1/2018  8:00 PM   Dressing Care, Wound dressing changed;foam 9/1/2018 12:45 PM       Wound 08/28/18 2000 Left heel abrasion (Active)    Dressing Appearance open to air 9/1/2018  8:00 PM   Drainage Amount none 9/1/2018  8:00 PM       Wound 08/28/18 2000 Right ankle abrasion (Active)   Dressing Appearance dry;intact 9/1/2018  8:00 PM   Drainage Amount scant 9/1/2018  8:00 PM   Dressing Care, Wound dressing changed;foam 9/1/2018 12:45 PM       Wound 08/28/18 2000 Right heel abrasion (Active)   Dressing Appearance open to air 9/1/2018  8:00 PM   Drainage Amount none 9/1/2018  8:00 PM       Wound 08/31/18 2000 Left upper back abrasion (Active)   Dressing Appearance dry;intact 9/1/2018  8:00 PM   Drainage Amount scant 9/1/2018  8:00 PM   Dressing Care, Wound foam 9/1/2018  8:00 PM       Wound 08/31/18 2000 lower back abrasion (Active)   Dressing Appearance open to air 9/1/2018  8:00 PM   Drainage Amount none 9/1/2018  8:00 PM       Wound 08/31/18 2000 Right hip abrasion (Active)   Dressing Appearance dry;intact 9/1/2018  8:00 PM   Drainage Amount none 9/1/2018  8:00 PM   Dressing Care, Wound foam 9/1/2018  8:00 PM             Physical Therapy Education     Title: PT OT SLP Therapies (Active)     Topic: Physical Therapy (Active)     Point: Mobility training (Active)    Learning Progress Summary     Learner Status Readiness Method Response Comment Documented by    Patient Active Acceptance E NR BOA TR 09/02/18 1138     Active Acceptance E,D NR Safety with transfers and gait, benefits of activity TR 09/01/18 0939     Done Acceptance E,TB,MILAGROS HURLEY,NR Education for safety/falls prevention with activity  Education for improved technique with bed mobility, transfers, and taking steps at bedside to reduce risk for LOB/falls  08/27/18 1201    Family Active Acceptance E,D NR Safety with transfers and gait, benefits of activity TR 09/01/18 0939          Point: Precautions (Done)    Learning Progress Summary     Learner Status Readiness Method Response Comment Documented by    Patient Done Acceptance E,TB,MOODY COELLO,MILAGROS,NR Education for safety/falls prevention with  activity  Education for improved technique with bed mobility, transfers, and taking steps at bedside to reduce risk for LOB/falls  08/27/18 1201                      User Key     Initials Effective Dates Name Provider Type Discipline    RAYSHAWN 08/02/16 -  Aysha Tineo PTA Physical Therapy Assistant PT     08/02/18 -  Marilia Farrar PT Physical Therapist PT                    PT Recommendation and Plan     Plan of Care Reviewed With: patient  Progress: no change  Outcome Summary: Pt very lethargic this AM and difficult to keep aroused throughout treatment. This did improve and pt was able to slighty comunicate; however confused. Pt required max assist for bed mobility and mod-max x2 for transfers. Pt required min assist to stand erect and look up. Pt able to tolerate standign 2-3 minutes. Attempted side steps ; however pt only able to shuffle L foot.  Pt was able to state his name; however unoriented to all other. Pt left fowelers with extit alrm and family iin room.           Outcome Measures     Row Name 09/02/18 1057 09/01/18 0915          How much help from another person do you currently need...    Turning from your back to your side while in flat bed without using bedrails? 2  -TR 3  -TR     Moving from lying on back to sitting on the side of a flat bed without bedrails? 2  -TR 3  -TR     Moving to and from a bed to a chair (including a wheelchair)? 2  -TR 2  -TR     Standing up from a chair using your arms (e.g., wheelchair, bedside chair)? 2  -TR 2  -TR     Climbing 3-5 steps with a railing? 1  -TR 1  -TR     To walk in hospital room? 2  -TR 2  -TR     AM-PAC 6 Clicks Score 11  -TR 13  -TR        Functional Assessment    Outcome Measure Options AM-PAC 6 Clicks Basic Mobility (PT)  -TR AM-PAC 6 Clicks Basic Mobility (PT)  -TR       User Key  (r) = Recorded By, (t) = Taken By, (c) = Cosigned By    Initials Name Provider Type    Aysha Ruby PTA Physical Therapy Assistant           Time  Calculation:         PT Charges     Row Name 09/02/18 1150             Time Calculation    Start Time 1057  -TR      Stop Time 1129  -TR      Time Calculation (min) 32 min  -TR      PT Received On 09/02/18  -TR      PT Goal Re-Cert Due Date 09/06/18  -TR         Time Calculation- PT    Total Timed Code Minutes- PT 32 minute(s)  -TR        User Key  (r) = Recorded By, (t) = Taken By, (c) = Cosigned By    Initials Name Provider Type    TR Aysha Tineo PTA Physical Therapy Assistant        Therapy Suggested Charges     Code   Minutes Charges    95645 (CPT®) Hc Pt Neuromusc Re Education Ea 15 Min      22043 (CPT®) Hc Pt Ther Proc Ea 15 Min      24316 (CPT®) Hc Gait Training Ea 15 Min      70049 (CPT®) Hc Pt Therapeutic Act Ea 15 Min 27 2    60064 (CPT®) Hc Pt Manual Therapy Ea 15 Min      57720 (CPT®) Hc Pt Iontophoresis Ea 15 Min      36239 (CPT®) Hc Pt Elec Stim Ea-Per 15 Min      00344 (CPT®) Hc Pt Ultrasound Ea 15 Min      55677 (CPT®) Hc Pt Self Care/Mgmt/Train Ea 15 Min      62335 (CPT®) Hc Pt Prosthetic (S) Train Initial Encounter, Each 15 Min      32670 (CPT®) Hc Pt Orthotic(S)/Prosthetic(S) Encounter, Each 15 Min      35288 (CPT®) Hc Orthotic(S) Mgmt/Train Initial Encounter, Each 15min      Total  27 2        Therapy Charges for Today     Code Description Service Date Service Provider Modifiers Qty    55192059679 HC PT THERAPEUTIC ACT EA 15 MIN 9/1/2018 Aysha Tineo PTA GP, KX 2    37678275836 HC PT THERAPEUTIC ACT EA 15 MIN 9/2/2018 Aysha Tineo PTA GP, KX 2          PT G-Codes  PT Professional Judgement Used?: Yes  Outcome Measure Options: AM-PAC 6 Clicks Basic Mobility (PT)  Score: 15  Functional Limitation: Mobility: Walking and moving around  Mobility: Walking and Moving Around Current Status (): At least 40 percent but less than 60 percent impaired, limited or restricted  Mobility: Walking and Moving Around Goal Status (): At least 20 percent but less than 40 percent  impaired, limited or restricted    Aysha Tineo, PTA  9/2/2018

## 2018-09-03 ENCOUNTER — APPOINTMENT (OUTPATIENT)
Dept: CT IMAGING | Facility: HOSPITAL | Age: 68
End: 2018-09-03

## 2018-09-03 LAB
ANION GAP SERPL CALCULATED.3IONS-SCNC: 12 MMOL/L (ref 4–13)
BACTERIA SPEC AEROBE CULT: NORMAL
BUN BLD-MCNC: 36 MG/DL (ref 5–21)
BUN/CREAT SERPL: 19.7 (ref 7–25)
CALCIUM SPEC-SCNC: 8.9 MG/DL (ref 8.4–10.4)
CHLORIDE SERPL-SCNC: 115 MMOL/L (ref 98–110)
CO2 SERPL-SCNC: 20 MMOL/L (ref 24–31)
CREAT BLD-MCNC: 1.83 MG/DL (ref 0.5–1.4)
CYTO UR: NORMAL
GFR SERPL CREATININE-BSD FRML MDRD: 37 ML/MIN/1.73
GLUCOSE BLD-MCNC: 67 MG/DL (ref 70–100)
GLUCOSE BLDC GLUCOMTR-MCNC: 70 MG/DL (ref 70–130)
LAB AP CASE REPORT: NORMAL
PATH REPORT.FINAL DX SPEC: NORMAL
PATH REPORT.GROSS SPEC: NORMAL
POTASSIUM BLD-SCNC: 4.9 MMOL/L (ref 3.5–5.3)
SODIUM BLD-SCNC: 147 MMOL/L (ref 135–145)

## 2018-09-03 PROCEDURE — 97530 THERAPEUTIC ACTIVITIES: CPT

## 2018-09-03 PROCEDURE — 70450 CT HEAD/BRAIN W/O DYE: CPT

## 2018-09-03 PROCEDURE — 80048 BASIC METABOLIC PNL TOTAL CA: CPT | Performed by: INTERNAL MEDICINE

## 2018-09-03 PROCEDURE — 82962 GLUCOSE BLOOD TEST: CPT

## 2018-09-03 PROCEDURE — 99231 SBSQ HOSP IP/OBS SF/LOW 25: CPT | Performed by: NEUROLOGICAL SURGERY

## 2018-09-03 RX ORDER — LORAZEPAM 2 MG/ML
0.5 INJECTION INTRAMUSCULAR ONCE
Status: DISCONTINUED | OUTPATIENT
Start: 2018-09-03 | End: 2018-09-05 | Stop reason: HOSPADM

## 2018-09-03 RX ORDER — QUETIAPINE FUMARATE 25 MG/1
25 TABLET, FILM COATED ORAL NIGHTLY
Status: DISCONTINUED | OUTPATIENT
Start: 2018-09-03 | End: 2018-09-05 | Stop reason: HOSPADM

## 2018-09-03 RX ADMIN — PROPRANOLOL HYDROCHLORIDE 10 MG: 10 TABLET ORAL at 20:59

## 2018-09-03 RX ADMIN — ACETAMINOPHEN 650 MG: 325 TABLET, FILM COATED ORAL at 20:59

## 2018-09-03 RX ADMIN — SODIUM BICARBONATE 650 MG: 650 TABLET ORAL at 20:59

## 2018-09-03 RX ADMIN — LACTULOSE 30 G: 20 SOLUTION ORAL at 16:00

## 2018-09-03 RX ADMIN — GUAIFENESIN 1200 MG: 600 TABLET, EXTENDED RELEASE ORAL at 20:58

## 2018-09-03 NOTE — PROGRESS NOTES
Nephrology ([facility]) Progress Note      Patient:  Ryan Alvarez  YOB: 1950  Date of Service: 9/3/2018  MRN: [id number]   Acct: [id number]   Primary Care Physician: Rufino Stevens APRN  Advance Directive:   Code Status and Medical Interventions:   Ordered at: 08/26/18 0454     Level Of Support Discussed With:    Patient     Code Status:    CPR     Medical Interventions (Level of Support Prior to Arrest):    Full     Admit Date: 8/25/2018       Hospital Day: 8  Referring Provider: Tayo Almonte MD      Patient personally seen and examined.  Complete chart including Consults, Notes, Operative Reports, Labs, Cardiology, and Radiology studies reviewed as able.        Subjective:  Ryan Alvarez is a 68 y.o. male  whom we were consulted for acute kidney injury.Uunknown renal baseline.  History of ETOH abuse, cirrhosis.  Presented to  after a fall at home with an unknown down time.  He remembers falling outside and crawling back into house; was found some time later by family member.  Diagnosed with ТАТЬЯНА and subdural hematoma.  Transferred to [facility] for higher level of care.  Required pressors briefly after arrival for blood pressure support; these are now discontinued and pt was moved to the floor.  Pedro placed for decreased UOP.   Today, he was awake, responsive but he appeared lethargic still.     Allergies:  Patient has no known allergies.    Home Meds:  Prescriptions Prior to Admission   Medication Sig Dispense Refill Last Dose   • furosemide (LASIX) 40 MG tablet Take 40 mg by mouth Daily.      • hydrOXYzine (ATARAX) 10 MG tablet Take 10 mg by mouth 4 (Four) Times a Day.      • omeprazole (priLOSEC) 20 MG capsule Take 20 mg by mouth Daily.      • propranolol (INDERAL) 10 MG tablet Take 10 mg by mouth 2 (Two) Times a Day.      • spironolactone (ALDACTONE) 100 MG tablet Take 100 mg by mouth 2 (Two) Times a Day.           Medicines:  Current Facility-Administered Medications   Medication Dose Route Frequency Provider Last Rate Last Dose   • acetaminophen (TYLENOL) tablet 650 mg  650 mg Oral Q4H PRN Tayo Almonte MD   650 mg at 09/01/18 1839    Or   • acetaminophen (TYLENOL) suppository 650 mg  650 mg Rectal Q4H PRN Tayo Almonte MD   650 mg at 09/02/18 2242   • acetaminophen (TYLENOL) tablet 650 mg  650 mg Oral Q6H PRN Tayo Almonte MD   650 mg at 08/28/18 2128   • flintstones complete (FLINTSTONES) chewable tablet 1 tablet  1 tablet Oral Daily Bennett Manzo DO   1 tablet at 09/01/18 0908   • guaiFENesin (MUCINEX) 12 hr tablet 1,200 mg  1,200 mg Oral Q12H Bennett Manzo DO   1,200 mg at 09/01/18 0908   • Hold medication  1 each Does not apply Continuous PRN Bennett Manzo DO       • hydrOXYzine (ATARAX) tablet 10 mg  10 mg Oral Q6H PRN Bennett Manzo DO       • ipratropium-albuterol (DUO-NEB) nebulizer solution 3 mL  3 mL Nebulization Q4H PRN Bennett Manzo DO   3 mL at 08/31/18 1942   • lactulose solution 30 g  30 g Oral TID Bennett Manzo DO       • LORazepam (ATIVAN) injection 0.5 mg  0.5 mg Intravenous Once Alireza Salinas MD       • ondansetron (ZOFRAN) tablet 4 mg  4 mg Oral Q6H PRN Tayo Almonte MD       • pantoprazole (PROTONIX) EC tablet 40 mg  40 mg Oral Q AM Bennett Manzo DO   40 mg at 09/01/18 1148   • propranolol (INDERAL) tablet 10 mg  10 mg Oral Q12H Bennett Manzo DO   10 mg at 09/01/18 0908   • sodium bicarbonate tablet 650 mg  650 mg Oral BID Sina Casas APRN   650 mg at 09/02/18 0825   • sodium chloride 0.9 % flush 1-10 mL  1-10 mL Intravenous PRN Tayo Almonte MD       • sodium chloride 0.9 % infusion  75 mL/hr Intravenous Continuous Vlad Chaudhry MD 75 mL/hr at 09/02/18 1853 75 mL/hr at 09/02/18 1853   • tamsulosin (FLOMAX) 24 hr capsule 0.4 mg  0.4 mg Oral Daily Bennett Manzo DO           Past Medical History:  Past Medical History:  "  Diagnosis Date   • Alcohol abuse    • Ascites    • Cirrhosis of liver (CMS/HCC)    • Hernia of abdominal wall        Past Surgical History:  Past Surgical History:   Procedure Laterality Date   • BACK SURGERY         Family History  History reviewed. No pertinent family history.    Social History  Social History     Social History   • Marital status:      Spouse name: N/A   • Number of children: N/A   • Years of education: N/A     Occupational History   • Not on file.     Social History Main Topics   • Smoking status: Never Smoker   • Smokeless tobacco: Never Used   • Alcohol use Yes      Comment: at least 6 pack/daily   • Drug use: No   • Sexual activity: Not on file     Other Topics Concern   • Not on file     Social History Narrative   • No narrative on file         Review of Systems:  History obtained from chart review and the patient  General ROS: No fever or chills  Respiratory ROS: No cough, shortness of breath, wheezing  Cardiovascular ROS: No chest pain or palpitations  Gastrointestinal ROS: No abdominal pain or melena  Genito-Urinary ROS: No dysuria or hematuria    Objective:  BP 97/55 (BP Location: Right arm, Patient Position: Lying)   Pulse 70   Temp 97.9 °F (36.6 °C) (Tympanic)   Resp 18   Ht 188 cm (74\")   Wt 94.9 kg (209 lb 4.8 oz)   SpO2 (!) 88%   BMI 26.87 kg/m²     Intake/Output Summary (Last 24 hours) at 09/03/18 1216  Last data filed at 09/03/18 0300   Gross per 24 hour   Intake                0 ml   Output              350 ml   Net             -350 ml     General: lethargic  Chest:  Decreased breath sounds at the bases  CVS:+/- regular rate and rhythm  Abdominal: soft/mild distention/umbilical hernia  Extremities: tr ble edema  Skin: warm and dry without rash      Labs:    Results from last 7 days  Lab Units 09/01/18  0504 08/31/18  0642 08/30/18  0657 08/29/18  0623   WBC 10*3/mm3 5.30  --  5.05 4.99   HEMOGLOBIN g/dL 9.1* 8.9* 8.7* 8.9*   HEMATOCRIT % 28.6* 27.5* 27.2* 27.5* "   PLATELETS 10*3/mm3 151  --  144 168           Results from last 7 days  Lab Units 09/03/18  0545 09/02/18  0512 09/01/18  0504 08/31/18  0642 08/30/18  0657   SODIUM mmol/L 147* 145 143 142 143   POTASSIUM mmol/L 4.9 4.8 4.6 4.8 4.1   CHLORIDE mmol/L 115* 114* 113* 113* 112*   CO2 mmol/L 20.0* 21.0* 22.0* 24.0 23.0*   BUN mg/dL 36* 33* 31* 29* 28*   CREATININE mg/dL 1.83* 1.72* 1.55* 1.32 1.49*   CALCIUM mg/dL 8.9 8.9 8.7 8.5 8.1*   BILIRUBIN mg/dL  --   --  0.7 1.0 0.7   ALK PHOS U/L  --   --  77 74 87   ALT (SGPT) U/L  --   --  47 37 37   AST (SGOT) U/L  --   --  42 51* 41   GLUCOSE mg/dL 67* 85 71 72 117*       Radiology:   Imaging Results (last 72 hours)     Procedure Component Value Units Date/Time    CT Head Without Contrast [[id number]] Collected:  08/31/18 1321     Updated:  08/31/18 1332    Narrative:       EXAM: CT OF THE HEAD WITHOUT IV CONTRAST 8/31/2018.     COMPARISON: Head CT dated 8/29/2018      INDICATION: Male, 68 years-old. Subdural hemorrhage      PROCEDURE: Non contrast enhanced head CT was performed.      Radiation dose equals  mGy-cm.  Automated exposure control dose  reduction technique was implemented.     FINDINGS:      Since most recent head CT dated 8/29/2018, there has been no significant  interval change in the right near hemispheric subdural hematoma. Midline  shift to the left is unchanged, approximately 3 mm. Right frontal  cavernous malformation unchanged. No new focus of intracranial  hemorrhage. No acute hydrocephalus.       Impression:       No significant interval changes.  This report was finalized on 08/31/2018 13:28 by Dr. Stefani Dunaway MD.     Thoracentesis [[id number]] Collected:  08/30/18 1413    Specimen:  Body Fluid Updated:  08/31/18 1151    Narrative:       DATE OF PROCEDURE:  8/30/2018.     PREPROCEDURE DIAGNOSIS:  Right pleural effusion.  POSTPROCEDURE DIAGNOSIS:  Right pleural effusion.          INDICATIONS FOR PROCEDURE: Dyspnea     PROCEDURE:   1.  Thoracentesis, diagnostic and therapeutic.  2. Ultrasound guidance for thoracentesis, imaging supervision and  interpretation.     ANESTHESIA: 1% lidocaine, injected locally.          COMPLICATIONS: None.        EXAMINATIONS FOR COMPARISON:  CT chest dated 8/29/2018.     DESCRIPTION OF PROCEDURE:   The risk and benefits of the procedure were explained to the patient.   Specifically, the risks of bleeding, infection, pneumothorax (possible  thoracostomy tube), and damage to surrounding structures were discussed  extensively. The patient's questions were answered. The patient opted to  proceed. Written and verbal consent were obtained from the patient.     TIME OUT was taken and the patient's name, medical record number, date  of birth, procedure, entry site, and listen allergies were confirmed.  The site of the procedure was confirmed and marked.      The rightposterior thorax was prepped and draped in sterile fashion. The  area was anesthetized with buffered lidocaine 2%.      A 5 Portuguese 10 cm  catheter was inserted into the pleural effusion  using  ultrasound guidance. Approximately 1500 mL of tea colored fluid was  recovered and sent to the pathology lab for analysis.      The patient tolerated the procedure well.   No immediate complications  were noted.       Impression:       Successful ultrasound guided rightthoracentesis.. No immediate  complications were noted.              This report was finalized on 08/30/2018 14:34 by Dr. Stefani Dunaway MD.     Chest [[id number]] Collected:  08/30/18 1413     Updated:  08/31/18 1151    Narrative:       DATE OF PROCEDURE:  8/30/2018.     PREPROCEDURE DIAGNOSIS:  Right pleural effusion.  POSTPROCEDURE DIAGNOSIS:  Right pleural effusion.          INDICATIONS FOR PROCEDURE: Dyspnea     PROCEDURE:   1. Thoracentesis, diagnostic and therapeutic.  2. Ultrasound guidance for thoracentesis, imaging supervision and  interpretation.     ANESTHESIA: 1% lidocaine, injected locally.           COMPLICATIONS: None.        EXAMINATIONS FOR COMPARISON:  CT chest dated 8/29/2018.     DESCRIPTION OF PROCEDURE:   The risk and benefits of the procedure were explained to the patient.   Specifically, the risks of bleeding, infection, pneumothorax (possible  thoracostomy tube), and damage to surrounding structures were discussed  extensively. The patient's questions were answered. The patient opted to  proceed. Written and verbal consent were obtained from the patient.     TIME OUT was taken and the patient's name, medical record number, date  of birth, procedure, entry site, and listen allergies were confirmed.  The site of the procedure was confirmed and marked.      The rightposterior thorax was prepped and draped in sterile fashion. The  area was anesthetized with buffered lidocaine 2%.      A 5 [place] Korean 10 cm  catheter was inserted into the pleural effusion  using  ultrasound guidance. Approximately 1500 mL of tea colored fluid was  recovered and sent to the pathology lab for analysis.      The patient tolerated the procedure well.   No immediate complications  were noted.       Impression:       Successful ultrasound guided rightthoracentesis.. No immediate  complications were noted.              This report was finalized on 08/30/2018 14:34 by Dr. Stefani Dunaway MD.    MRI Brain With & Without Contrast [[id number]] Collected:  08/30/18 1458     Updated:  08/30/18 1518    Narrative:       EXAM: MR BRAIN WITHOUT AND WITH IV CONTRAST 8/30/2018     COMPARISON: Head CT dated 8/29/2018      HISTORY: 68 years-old Male. Right frontal calcification;  I62.00-Nontraumatic subdural hemorrhage, unspecified; N17.9-Acute kidney  failure, unspecified; E86.0-Dehydration; D64.9-Anemia, unspecified;  M62.82-Rhabdomyolysis; F10.10-Alcohol abuse, uncomplicated;  W19.XXXA-Unspecified fall, initial encounter; T07.XXXA-Unspecified  multiple injuries, initial encounter; R74.8-Abnormal levels of other  serum enzymes;  Z74.09-Other reduce     TECHNIQUE:   Routine pulse sequences were obtained of the brain before and after the  administration of IV contrast.      REPORT:      There is a near right hemispheric subdural hematoma, with maximum  thickness of 1.5 cm. 3 mm right to left midline shift. There is no  evidence of intraventricular hemorrhage.     The previously seen hyperdensity in head CT centered in the right  superior frontal gyrus corresponds with a cavernous malformation  measuring 2 cm. There is no associated T2/FLAIR abnormal signal to  suggest recent hemorrhage.        There is no diffusion restriction within the brain parenchyma to suggest  acute infarct. No acute hydrocephalus.       Impression:       1.  known-right right near hemispheric subdural hematoma.  2.  Right frontal cavernous malformation.  This report was finalized on 08/30/2018 15:15 by Dr. Stefani Dunaway MD.    XR Chest AP [[id number]] Collected:  08/30/18 1414     Updated:  08/30/18 1417    Narrative:       XR CHEST AP- 8/30/2018 2:06 PM CDT     HISTORY: Post thoracentesis of the RIGHT pleural effusion     COMPARISON: 8/29/2018.     FINDINGS:   The RIGHT pleural effusion has significantly improved. There is no  evidence of pneumothorax. The LEFT lung is stable. The cardiomediastinal  silhouette and pulmonary vascularity are unchanged.      The osseous structures and surrounding soft tissues demonstrate no acute  abnormality.       Impression:       1. No pneumothorax after RIGHT thoracentesis.        This report was finalized on 08/30/2018 14:14 by Dr. Brenton Horne MD.          Culture:         Assessment   -ТАТЬЯНА  -Rhabdomyolysis- resolved  -Fall with Sub dural hematoma  -Alcoholic cirrhosis  -Anemia  -Metabolic acidosis     Plan:  Continue IV fluids and follow up labs.     Guero Ferrell MD  9/3/2018  12:16 PM

## 2018-09-03 NOTE — PROGRESS NOTES
"    [facility] Medicine Services  INPATIENT PROGRESS NOTE    Length of Stay: 8  Date of Admission: 8/25/2018  Primary Care Physician: Rufino Stevens APRN    Subjective     Chief Complaint:   Intermittent confusion    HPI     The patient continues to suffer from intermittent confusion secondary to multi-factorial physiologic contributors.  Creatinine 1.3 today.  Urine culture no growth at 2 days.  Pleural fluid culture -4 days.  CT scan of the brain is stable showing no growth in the subdural hematoma.  I had long discussion with the patient's sisters today explaining that the patient's recovery would be very slow and prolonged and that it would be unlikely the patient would ever be functionally independent again.  She believes that the patient's 2 children are \"in denial\" regarding their father's overall condition.    Review of Systems     All pertinent negatives and positives are as above. All other systems have been reviewed and are negative unless otherwise stated.     Objective    Temp:  [96 °F (35.6 °C)-98.6 °F (37 °C)] 97.4 °F (36.3 °C)  Heart Rate:  [61-70] 70  Resp:  [18] 18  BP: ()/(55-76) 102/72    Lab Results (last 24 hours)     Procedure Component Value Units Date/Time    Anaerobic Culture - Pleural Fluid, Pleural Cavity [[id number]]  (Normal) Collected:  08/30/18 1342    Specimen:  Pleural Fluid from Pleural Cavity Updated:  09/03/18 1141     Culture No anaerobes isolated at 4 days    Body Fluid Culture - Body Fluid, Pleural Cavity [[id number]]  (Normal) Collected:  08/30/18 1342    Specimen:  Body Fluid from Pleural Cavity Updated:  09/03/18 1000     BF Culture No growth at 4 days     Gram Stain Result Moderate (3+) WBCs seen      No organisms seen    Urine Culture - Urine, [[id number]]  (Normal) Collected:  09/01/18 1834    Specimen:  Urine from Urine, Catheter Updated:  09/03/18 0912     Urine Culture No growth at 2 days    POC Glucose Once [[id number]]  (Normal) " Collected:  09/03/18 0808    Specimen:  Blood Updated:  09/03/18 0821     Glucose 70 mg/dL      Comment: : [id number] Noreen RainesMeter ID: [id number]       Basic Metabolic Panel [[id number]]  (Abnormal) Collected:  09/03/18 0545    Specimen:  Blood Updated:  09/03/18 0636     Glucose 67 (L) mg/dL      BUN 36 (H) mg/dL      Creatinine 1.83 (H) mg/dL      Sodium 147 (H) mmol/L      Potassium 4.9 mmol/L      Chloride 115 (H) mmol/L      CO2 20.0 (L) mmol/L      Calcium 8.9 mg/dL      eGFR Non African Amer 37 (L) mL/min/1.73      BUN/Creatinine Ratio 19.7     Anion Gap 12.0 mmol/L     Narrative:       GFR Normal >60  Chronic Kidney Disease <60  Kidney Failure <15          Imaging Results (last 24 hours)     Procedure Component Value Units Date/Time    CT Head Without Contrast [[id number]] Collected:  09/03/18 1119     Updated:  09/03/18 1124    Narrative:       CT HEAD WO CONTRAST- 9/3/2018 10:46 AM CDT     HISTORY: Subdural hemorrhage and altered mental status       COMPARISON: 8/31/2018      DOSE LENGTH PRODUCT: 1091 mGy cm. Automated exposure control was also  utilized to decrease patient radiation dose.     TECHNIQUE: Helical tomographic images of the brain were obtained without  the use of intravenous contrast.      FINDINGS:   The RIGHT frontal and parietal subdural hemorrhage measures 1.4 cm in  thickness, unchanged from the previous exam. A hyperdense lesion is  again seen in the high RIGHT frontal region. This is unchanged. Mild  changes of chronic microvascular ischemia are again identified. There is  no evidence of hydrocephalus or new hemorrhage.     The bones and soft tissues are unchanged.       Impression:       1. Stable CT of the brain.  2. No change in the moderate-sized RIGHT frontoparietal subdural  hemorrhage.  This report was finalized on 09/03/2018 11:21 by Dr. Brenton Horne MD.             Intake/Output Summary (Last 24 hours) at 09/03/18 1635  Last data filed at 09/03/18 0300   Gross per 24  hour   Intake                0 ml   Output              150 ml   Net             -150 ml       Physical Exam    Constitutional: The patient is currently lying in bed.  He is confused.  The patient's sister is present in the room.  Head: Normocephalic and atraumatic.   Eyes: Pupils are equal, round, and reactive to light. Conjunctivae and EOM are normal.   Neck: Neck supple. No JVD present.   Cardiovascular: Normal rate, regular rhythm, normal heart sounds and intact distal pulses.  Exam reveals no gallop and no friction rub. No murmur heard.  Pulmonary/Chest: Effort normal. No respiratory distress. Diminished breath sounds in the right lung base. Remains on room air.    Abdominal: Soft. Bowel sounds are normal. He exhibits distension (softer today). There is no tenderness. There is no rebound and no guarding. A hernia (reducible umbilical) is present.   Musculoskeletal: Normal range of motion. He exhibits no edema, tenderness or deformity.   Neurological: Awake, alert. Oriented to person and place. He displays normal reflexes. No cranial nerve deficit. He exhibits normal muscle tone. No tremor.  Generalized weakness, nonfocal.   Skin: Skin is warm and dry. No rash noted. Widespread healing abrasions and bruises.    Psychiatric: He has a normal mood and affect. His behavior is normal. Judgment and thought content normal.    Results Review:  I have reviewed the labs, radiology results, and diagnostic studies since my last progress note and made treatment changes reflective of the results.   I have reviewed the current medications.    Assessment/Plan     Hospital Problem List     * (Principal)Subdural hematoma (CMS/HCC)    ТАТЬЯНА (acute kidney injury) (CMS/HCC)    Non-traumatic rhabdomyolysis    Abrasions of multiple sites    Dehydration    Elevated troponin    Anemia    Alcohol abuse    Umbilical hernia    Cirrhosis of liver (CMS/HCC)          PLAN:  Seroquel 25 mg by mouth at at bedtime  Transfer to SNF soon  Continue  IV fluids    Den Elena DO   09/03/18   4:35 PM

## 2018-09-03 NOTE — PROGRESS NOTES
Ryan Alvarez  68 y.o.      Subjective  No events    Temp:  [96 °F (35.6 °C)-98.6 °F (37 °C)] 97.9 °F (36.6 °C)  Heart Rate:  [61-70] 70  Resp:  [16-18] 18  BP: ()/(51-76) 97/55      Objective    Neurologic Exam    Lethargic  Opens eyes to voice  Oriented to name/location  Dysarthric speech  Follows commands slowly  SINGH - wiggles toes, raises arms to command    Principal Problem:    Subdural hematoma (CMS/HCC)  Active Problems:    Alcohol abuse    ТАТЬЯНА (acute kidney injury) (CMS/HCC)    Umbilical hernia    Abrasions of multiple sites    Cirrhosis of liver (CMS/HCC)    Non-traumatic rhabdomyolysis    Dehydration    Elevated troponin    Anemia      Lab Results (last 24 hours)     Procedure Component Value Units Date/Time    Body Fluid Culture - Body Fluid, Pleural Cavity [[id number]]  (Normal) Collected:  08/30/18 1342    Specimen:  Body Fluid from Pleural Cavity Updated:  09/03/18 1000     BF Culture No growth at 4 days     Gram Stain Result Moderate (3+) WBCs seen      No organisms seen    Urine Culture - Urine, [[id number]]  (Normal) Collected:  09/01/18 1834    Specimen:  Urine from Urine, Catheter Updated:  09/03/18 0912     Urine Culture No growth at 2 days    POC Glucose Once [[id number]]  (Normal) Collected:  09/03/18 0808    Specimen:  Blood Updated:  09/03/18 0821     Glucose 70 mg/dL      Comment: : [id number] Noreen FrankMeter ID: [id number]       Basic Metabolic Panel [[id number]]  (Abnormal) Collected:  09/03/18 0545    Specimen:  Blood Updated:  09/03/18 0636     Glucose 67 (L) mg/dL      BUN 36 (H) mg/dL      Creatinine 1.83 (H) mg/dL      Sodium 147 (H) mmol/L      Potassium 4.9 mmol/L      Chloride 115 (H) mmol/L      CO2 20.0 (L) mmol/L      Calcium 8.9 mg/dL      eGFR Non African Amer 37 (L) mL/min/1.73      BUN/Creatinine Ratio 19.7     Anion Gap 12.0 mmol/L     Narrative:       GFR Normal >60  Chronic Kidney Disease <60  Kidney Failure <15              Plan:        68-year-old male with  history of alcohol abuse and liver cirrhosis found down after 3 days     1) Right subdural hematoma - wax/wane mental status during hospital course, but imaging shows stable hematoma size.  If subdural hematoma enlarges and causes neurologic deficit, discussed with patient and he wishes to proceed with surgery but as last resort with his higher surgical risks.  At this time, hopefully the subdural hematoma will improve without surgical intervention given liver cirrhosis, acute kidney injury, rhabdomyolysis.  Encephalopathy likely multifactorial with multiple medical comorbidities given stable imaging findings.      2) Right frontal lobe calcification with intraparenchymal hemorrhage - MRI shows cavernous malformation     3)  Reviewed imaging with family, all questions answered     Alireza Salinas MD

## 2018-09-03 NOTE — PLAN OF CARE
Problem: Patient Care Overview  Goal: Plan of Care Review  Outcome: Ongoing (interventions implemented as appropriate)   09/03/18 0968   Coping/Psychosocial   Plan of Care Reviewed With patient   Plan of Care Review   Progress no change   OTHER   Outcome Summary Pt very lethargic this AM and required increased verbal and tactile cues to continue to participate in therapy. Pt required max assist x2 for all mobility due to being lethargic. Once standing pt was cheng to maintain standing with min assist; however would fatigue quickly and sit back down. Pt unable to follow any commands.

## 2018-09-03 NOTE — PLAN OF CARE
Problem: Fall Risk (Adult)  Goal: Absence of Fall  Outcome: Ongoing (interventions implemented as appropriate)      Problem: Skin Injury Risk (Adult)  Goal: Skin Health and Integrity  Outcome: Ongoing (interventions implemented as appropriate)      Problem: Patient Care Overview  Goal: Plan of Care Review  Outcome: Ongoing (interventions implemented as appropriate)   09/03/18 0327   Coping/Psychosocial   Plan of Care Reviewed With patient   Plan of Care Review   Progress no change   OTHER   Outcome Summary Pt has nonverbal indicators of pain and PRN tylenol given. Pedro care done. Turn Q2. Dressings intact to skin issues. Continues to be drowsy with slurred speech. Restless. Will not keep tele on. BP low but stable. VSS. Safety maintained.       Problem: Alcohol Withdrawal Acute, Risk/Actual (Adult)  Goal: Signs and Symptoms of Listed Potential Problems Will be Absent, Minimized or Managed (Alcohol Withdrawal Acute, Risk/Actual)  Outcome: Ongoing (interventions implemented as appropriate)      Problem: Nutrition, Imbalanced: Inadequate Oral Intake (Adult)  Goal: Improved Oral Intake  Outcome: Ongoing (interventions implemented as appropriate)      Problem: Brain Injury, Moderate Traumatic (GCS 9-12) (Adult)  Goal: Signs and Symptoms of Listed Potential Problems Will be Absent, Minimized or Managed (Brain Injury, Moderate Traumatic)  Outcome: Ongoing (interventions implemented as appropriate)

## 2018-09-03 NOTE — THERAPY TREATMENT NOTE
Acute Care - Physical Therapy Treatment Note  [facility]     Patient Name: Ryan Alvarez  : 1950  MRN: [id number]  Today's Date: 9/3/2018  Onset of Illness/Injury or Date of Surgery: 18  Date of Referral to PT: 18  Referring Physician: Dr. Salinas    Admit Date: 2018    Visit Dx:    ICD-10-CM ICD-9-CM   1. Subdural hematoma (CMS/HCC) I62.00 432.1   2. ТАТЬЯНА (acute kidney injury) (CMS/HCC) N17.9 584.9   3. Dehydration E86.0 276.51   4. Anemia, unspecified type D64.9 285.9   5. Non-traumatic rhabdomyolysis M62.82 728.88   6. Alcohol abuse F10.10 305.00   7. Fall, initial encounter W19.XXXA E888.9   8. Abrasions of multiple sites T07.XXXA 919.0   9. Elevated troponin R74.8 790.6   10. Impaired mobility and ADLs Z74.09 799.89   11. Generalized weakness R53.1 780.79   12. Oropharyngeal dysphagia R13.12 787.22     Patient Active Problem List   Diagnosis   • Subdural hematoma (CMS/HCC)   • Alcohol abuse   • ТАТЬЯНА (acute kidney injury) (CMS/HCC)   • Umbilical hernia   • Abrasions of multiple sites   • Cirrhosis of liver (CMS/HCC)   • Non-traumatic rhabdomyolysis   • Dehydration   • Elevated troponin   • Anemia       Therapy Treatment          Rehabilitation Treatment Summary     Row Name 18 0858             Treatment Time/Intention    Discipline physical therapy assistant  -TR      Document Type therapy note (daily note)  -TR      Subjective Information complains of;pain  -TR2      Mode of Treatment speech-language pathology  -TR2      Patient/Family Observations Pt's sister in room   -TR2      Patient Effort poor  -TR2      Comment Pt verry lethargic, difficult to arouse   -TR2      Existing Precautions/Restrictions fall  -TR2      Recorded by [TR] Aysha Tineo, PTA 18 0858  [TR2] Aysha Tineo, PTA 18 0934      Row Name 18 0858             Cognitive Assessment/Intervention- PT/OT    Orientation Status (Cognition) oriented to;person;disoriented to;place;situation;time   -TR      Personal Safety Interventions elopement precautions initiated  -TR      Recorded by [TR] Aysha Tineo, [facility] 09/03/18 0934      Row Name 09/03/18 0858             Bed Mobility Assessment/Treatment    Bed Mobility Assessment/Treatment supine-sit;sit-supine  -TR      Scooting/Bridging [place] (Bed Mobility) verbal cues;maximum assist (25% patient effort);2 person assist  -TR      Supine-Sit [place] (Bed Mobility) verbal cues;maximum assist (25% patient effort);2 person assist  -TR      Sit-Supine [place] (Bed Mobility) verbal cues;maximum assist (25% patient effort);2 person assist  -TR      Bed Mobility, Safety Issues decreased use of arms for pushing/pulling;decreased use of legs for bridging/pushing  -TR      Assistive Device (Bed Mobility) bed rails;draw sheet  -TR      Recorded by [TR] Aysha Tineo, [facility] [place] [facility] 09/03/18 0934      Row Name 09/03/18 0858             Sit-Stand Transfer    Sit-Stand [place] (Transfers) verbal cues;maximum assist (25% patient effort);2 person assist  -TR      Assistive Device (Sit-Stand Transfers) walker, front-wheeled  -TR      Recorded by [TR] Aysha Tineo, [facility] 09/03/18 0934      Row Name 09/03/18 0858             Stand-Sit Transfer    Stand-Sit [place] (Transfers) verbal cues;maximum assist (25% patient effort);2 person assist  -TR      Assistive Device (Stand-Sit Transfers) walker, front-wheeled  -TR      Recorded by [TR] Aysha Tineo, [facility] 09/03/18 0934      Row Name 09/03/18 0858             Therapeutic Exercise    Lower Extremity (Therapeutic Exercise) gastroc stretch, left;gastroc stretch, right  -TR      Recorded by [TR] Aysha Tineo, [facility] [place] 09/03/18 0934      Row Name 09/03/18 0858             Dynamic Sitting Balance    Level of [place], Reaches Outside Midline (Sitting, Dynamic Balance) contact guard assist;minimal assist, 75% patient effort  -TR      Sitting Position, Reaches Outside Midline (Sitting,  Dynamic Balance) sitting on edge of bed  -TR      Recorded by [TR] Aysha Tineo, [facility] 09/03/18 0934      Row Name 09/03/18 0858             Static Standing Balance    Level of [place] (Supported Standing, Static Balance) minimal assist, 75% patient effort;moderate assist, 50 to 74% patient effort  -TR      Comment (Supported Standing, Static Balance) RW, ranged from min to mod assit   -TR      Recorded by [TR] Aysha Tineo, [facility] 09/03/18 0934      Row Name 09/03/18 0858             Positioning and Restraints    Pre-Treatment Position in bed  -TR      Post Treatment Position bed  -TR      In Bed fowlers;call light within reach;encouraged to call for assist;side rails up x2  -TR      Recorded by [TR] Aysha Tineo, [facility] 09/03/18 0934      Row Name 09/03/18 0858             Pain Scale: FACES Pre/Post-Treatment    Pain: FACES Scale, Pretreatment 0-->no hurt  -TR      Pain: FACES Scale, Post-Treatment 0-->no hurt  -TR      Recorded by [TR] Aysha Tineo, [facility] 09/03/18 0934      Row Name                Wound 08/26/18 0213 Left  abrasion    Wound - Properties Group Date first assessed: 08/26/18 [SB] Time first assessed: 0213 [SB] Present On Admission : yes [SB] Side: Left [SB] Type: abrasion [SB] Recorded by:  [SB] Corrina Dobbs RN 08/26/18 0341    Row Name                Wound 08/26/18 0213 Left knee abrasion    Wound - Properties Group Date first assessed: 08/26/18 [SB] Time first assessed: 0213 [SB] Side: Left [SB] Location: knee [SB] Type: abrasion [SB] Recorded by:  [SB] Corrina Dobbs RN 08/26/18 0344    Row Name                Wound 08/28/18 2000 Left ankle abrasion    Wound - Properties Group Date first assessed: 08/28/18 [AR] Time first assessed: 2000 [AR] Present On Admission : yes [AR] Side: Left [AR] Location: ankle [AR] Type: abrasion [AR] Recorded by:  [AR] Glory Castorena RN 08/29/18 0104    Row Name                Wound 08/28/18 2000 Left heel abrasion    Wound  - Properties Group Date first assessed: 08/28/18 [AR] Time first assessed: 2000 [AR] Present On Admission : yes [AR] Side: Left [AR] Location: heel [AR] Type: abrasion [AR] Recorded by:  [AR] Glory Castorena RN 08/29/18 0105    Row Name                Wound 08/28/18 2000 Right ankle abrasion    Wound - Properties Group Date first assessed: 08/28/18 [AR] Time first assessed: 2000 [AR] Present On Admission : yes [AR] Side: Right [AR] Location: ankle [AR] Type: abrasion [AR] Recorded by:  [AR] lGory Castorena RN 08/29/18 0106    Row Name                Wound 08/28/18 2000 Right heel abrasion    Wound - Properties Group Date first assessed: 08/28/18 [AR] Time first assessed: 2000 [AR] Present On Admission : yes [AR] Side: Right [AR] Location: heel [AR] Type: abrasion [AR] Recorded by:  [AR] Glory Castorena RN 08/29/18 0107    Row Name                Wound 08/31/18 2000 Left upper back abrasion    Wound - Properties Group Date first assessed: 08/31/18 [FI] Time first assessed: 2000 [FI] Present On Admission : no [FI] Side: Left [FI] Orientation: upper [FI] Location: back [FI] Type: abrasion [FI2] Recorded by:  [FI] Dina Thomson, RNA 08/31/18 2152 [FI2] Dina Thomson, RNA 08/31/18 2155    Row Name                Wound 08/31/18 2000 lower back abrasion    Wound - Properties Group Date first assessed: 08/31/18 [AR] Time first assessed: 2000 [AR] Present On Admission : picture taken [AR] Orientation: lower [AR] Location: back [AR] Type: abrasion [AR] Recorded by:  [AR] Glory Castorena RN 09/01/18 0444    Row Name                Wound 08/31/18 2000 Right hip abrasion    Wound - Properties Group Date first assessed: 08/31/18 [AR] Time first assessed: 2000 [AR] Present On Admission : picture taken [AR] Side: Right [AR] Location: hip [AR] Type: abrasion [AR] Recorded by:  [AR] Glory Castorena, RN 09/01/18 2657      User Key  (r) = Recorded By, (t) = Taken By, (c) = Cosigned By    Initials Name Effective Dates  Discipline    AR Glory Castorena RN 08/02/16 -  Nurse    Corrina Xie RN 12/15/17 -  Nurse    Aysha Ruby, DARYL 08/02/16 -  PT    Dina Salgado RNA 01/16/18 -  Nurse          Wound 08/26/18 0213 Left  abrasion (Active)   Dressing Appearance dried drainage 9/3/2018  7:56 AM       Wound 08/26/18 0213 Left knee abrasion (Active)   Dressing Appearance open to air 9/3/2018  7:56 AM       Wound 08/28/18 2000 Left ankle abrasion (Active)   Dressing Appearance dry;intact 9/3/2018  7:56 AM       Wound 08/28/18 2000 Left heel abrasion (Active)   Dressing Appearance open to air 9/3/2018  7:56 AM       Wound 08/28/18 2000 Right ankle abrasion (Active)   Dressing Appearance dry;intact 9/3/2018  7:56 AM       Wound 08/28/18 2000 Right heel abrasion (Active)   Dressing Appearance open to air 9/3/2018  7:56 AM       Wound 08/31/18 2000 Left upper back abrasion (Active)   Dressing Appearance dry;intact 9/3/2018  7:56 AM       Wound 08/31/18 2000 lower back abrasion (Active)   Dressing Appearance open to air 9/3/2018  7:56 AM       Wound 08/31/18 2000 Right hip abrasion (Active)   Dressing Appearance dry;intact 9/3/2018  7:56 AM             Physical Therapy Education     Title: PT OT SLP Therapies (Active)     Topic: Physical Therapy (Active)     Point: Mobility training (Active)    Learning Progress Summary     Learner Status Readiness Method Response Comment Documented by    Patient Active Acceptance E NR,NL BOA  09/03/18 0934     Active Acceptance E NR BOA  09/02/18 1138     Active Acceptance E,D NR Safety with transfers and gait, benefits of activity  09/01/18 0939     Done Acceptance E,TB,D VU,DU,NR Education for safety/falls prevention with activity  Education for improved technique with bed mobility, transfers, and taking steps at bedside to reduce risk for LOB/falls  08/27/18 1201    Family Active Acceptance E,D NR Safety with transfers and gait, benefits of activity  09/01/18 0939           Point: Precautions (Done)    Learning Progress Summary     Learner Status Readiness Method Response Comment Documented by    Patient Done Acceptance E,TB,D MODE,DU,NR Education for safety/falls prevention with activity  Education for improved technique with bed mobility, transfers, and taking steps at bedside to reduce risk for LOB/falls  08/27/18 1201                      User Key     Initials Effective Dates Name Provider Type Discipline     08/02/16 -  Aysha Tineo, PTA Physical Therapy Assistant PT     08/02/18 -  Mairlia Farrar PT Physical Therapist PT                    PT Recommendation and Plan     Plan of Care Reviewed With: patient  Progress: no change  Outcome Summary: Pt very lethargic this AM and required increased verbal and tactile cues to continue to participate in therapy. Pt required max asisst x2 for all mobility due to being lethargic. Once standign pt was cheng to maintain standing with min assist; however would fatigue quickly and sit back down. Pt unable to follow any commands.           Outcome Measures     Row Name 09/03/18 0858 09/02/18 1057 09/01/18 0915       How much help from another person do you currently need...    Turning from your back to your side while in flat bed without using bedrails? 2  -TR 2  -TR 3  -TR    Moving from lying on back to sitting on the side of a flat bed without bedrails? 2  -TR 2  -TR 3  -TR    Moving to and from a bed to a chair (including a wheelchair)? 2  -TR 2  -TR 2  -TR    Standing up from a chair using your arms (e.g., wheelchair, bedside chair)? 2  -TR 2  -TR 2  -TR    Climbing 3-5 steps with a railing? 1  -TR 1  -TR 1  -TR    To walk in hospital room? 2  -TR 2  -TR 2  -TR    AM-PAC 6 Clicks Score 11  -TR 11  -TR 13  -TR       Functional Assessment    Outcome Measure Options AM-PAC 6 Clicks Basic Mobility (PT)  -TR AM-PAC 6 Clicks Basic Mobility (PT)  -TR AM-PAC 6 Clicks Basic Mobility (PT)  -TR      User Key  (r) = Recorded By, (t) =  Taken By, (c) = Cosigned By    Initials Name Provider Type    TR Aysha Tineo PTA Physical Therapy Assistant           Time Calculation:         PT Charges     Row Name 09/03/18 0858             Time Calculation    Start Time 0858  -TR      Stop Time 0925  -TR      Time Calculation (min) 27 min  -TR      PT Received On 09/03/18  -TR      PT Goal Re-Cert Due Date 09/06/18  -TR         Time Calculation- PT    Total Timed Code Minutes- PT 27 minute(s)  -TR         Timed Charges    50945 - PT Therapeutic Activity Minutes 27  -TR        User Key  (r) = Recorded By, (t) = Taken By, (c) = Cosigned By    Initials Name Provider Type    TR Aysha Tineo PTA Physical Therapy Assistant        Therapy Suggested Charges     Code   Minutes Charges    25251 (CPT®) Hc Pt Neuromusc Re Education Ea 15 Min      10848 (CPT®) Hc Pt Ther Proc Ea 15 Min      85041 (CPT®) Hc Gait Training Ea 15 Min      53163 (CPT®) Hc Pt Therapeutic Act Ea 15 Min 27 2    45918 (CPT®) Hc Pt Manual Therapy Ea 15 Min      03011 (CPT®) Hc Pt Iontophoresis Ea 15 Min      41278 (CPT®) Hc Pt Elec Stim Ea-Per 15 Min      84981 (CPT®) Hc Pt Ultrasound Ea 15 Min      40336 (CPT®) Hc Pt Self Care/Mgmt/Train Ea 15 Min      68536 (CPT®) Hc Pt Prosthetic (S) Train Initial Encounter, Each 15 Min      80464 (CPT®) Hc Pt Orthotic(S)/Prosthetic(S) Encounter, Each 15 Min      07788 (CPT®) Hc Orthotic(S) Mgmt/Train Initial Encounter, Each 15min      Total  27 2        Therapy Charges for Today     Code Description Service Date Service Provider Modifiers Qty    01165918202 HC PT THERAPEUTIC ACT EA 15 MIN 9/2/2018 Aysha Tineo PTA GP, KX 2    42200120213 HC PT THERAPEUTIC ACT EA 15 MIN 9/3/2018 Aysha Tineo PTA GP, KX 2          PT G-Codes  PT Professional Judgement Used?: Yes  Outcome Measure Options: AM-PAC 6 Clicks Basic Mobility (PT)  Score: 15  Functional Limitation: Mobility: Walking and moving around  Mobility: Walking and Moving Around  Current Status (): At least 40 percent but less than 60 percent impaired, limited or restricted  Mobility: Walking and Moving Around Goal Status (): At least 20 percent but less than 40 percent impaired, limited or restricted    Aysha Tineo, PTA  9/3/2018

## 2018-09-03 NOTE — PROGRESS NOTES
Continued Stay Note   [place]     Patient Name: Ryan Alvarez  MRN: [id number]  Today's Date: 9/3/2018    Admit Date: 8/25/2018          Discharge Plan     Row Name 09/03/18 1231       Plan    Plan [facility]    Patient/Family in Agreement with Plan yes    Plan Comments Plan for admission to [facility] at time of discharge. SW reviewed chart and will follow for patient to be medically stable for discharge to [facility] (skilled).                 RADHA BackW

## 2018-09-04 ENCOUNTER — APPOINTMENT (OUTPATIENT)
Dept: NEUROLOGY | Facility: HOSPITAL | Age: 68
End: 2018-09-04
Attending: NEUROLOGICAL SURGERY

## 2018-09-04 LAB
ALBUMIN SERPL-MCNC: 2.8 G/DL (ref 3.5–5)
ALBUMIN/GLOB SERPL: 0.8 G/DL (ref 1.1–2.5)
ALP SERPL-CCNC: 93 U/L (ref 24–120)
ALT SERPL W P-5'-P-CCNC: 39 U/L (ref 0–54)
ANION GAP SERPL CALCULATED.3IONS-SCNC: 10 MMOL/L (ref 4–13)
AST SERPL-CCNC: 50 U/L (ref 7–45)
BACTERIA FLD CULT: NORMAL
BACTERIA SPEC ANAEROBE CULT: NORMAL
BILIRUB SERPL-MCNC: 0.8 MG/DL (ref 0.1–1)
BUN BLD-MCNC: 39 MG/DL (ref 5–21)
BUN/CREAT SERPL: 16.7 (ref 7–25)
CALCIUM SPEC-SCNC: 9.1 MG/DL (ref 8.4–10.4)
CHLORIDE SERPL-SCNC: 115 MMOL/L (ref 98–110)
CO2 SERPL-SCNC: 22 MMOL/L (ref 24–31)
CREAT BLD-MCNC: 2.34 MG/DL (ref 0.5–1.4)
GFR SERPL CREATININE-BSD FRML MDRD: 28 ML/MIN/1.73
GLOBULIN UR ELPH-MCNC: 3.5 GM/DL
GLUCOSE BLD-MCNC: 77 MG/DL (ref 70–100)
GRAM STN SPEC: NORMAL
GRAM STN SPEC: NORMAL
POTASSIUM BLD-SCNC: 5.2 MMOL/L (ref 3.5–5.3)
PROT SERPL-MCNC: 6.3 G/DL (ref 6.3–8.7)
SODIUM BLD-SCNC: 147 MMOL/L (ref 135–145)

## 2018-09-04 PROCEDURE — 63710000001 FLINTSTONES COMPLETE 60 MG CHEWABLE TABLET: Performed by: INTERNAL MEDICINE

## 2018-09-04 PROCEDURE — 95816 EEG AWAKE AND DROWSY: CPT

## 2018-09-04 PROCEDURE — 97110 THERAPEUTIC EXERCISES: CPT

## 2018-09-04 PROCEDURE — 95816 EEG AWAKE AND DROWSY: CPT | Performed by: PSYCHIATRY & NEUROLOGY

## 2018-09-04 PROCEDURE — 80053 COMPREHEN METABOLIC PANEL: CPT | Performed by: NURSE PRACTITIONER

## 2018-09-04 PROCEDURE — 92526 ORAL FUNCTION THERAPY: CPT

## 2018-09-04 PROCEDURE — 97535 SELF CARE MNGMENT TRAINING: CPT

## 2018-09-04 PROCEDURE — 99231 SBSQ HOSP IP/OBS SF/LOW 25: CPT | Performed by: NEUROLOGICAL SURGERY

## 2018-09-04 RX ORDER — SODIUM CHLORIDE 450 MG/100ML
150 INJECTION, SOLUTION INTRAVENOUS CONTINUOUS
Status: DISCONTINUED | OUTPATIENT
Start: 2018-09-04 | End: 2018-09-05 | Stop reason: HOSPADM

## 2018-09-04 RX ADMIN — ACETAMINOPHEN 650 MG: 325 TABLET, FILM COATED ORAL at 05:38

## 2018-09-04 RX ADMIN — ACETAMINOPHEN 650 MG: 325 TABLET, FILM COATED ORAL at 01:03

## 2018-09-04 RX ADMIN — LACTULOSE 30 G: 20 SOLUTION ORAL at 11:21

## 2018-09-04 RX ADMIN — SODIUM CHLORIDE 100 ML/HR: 4.5 INJECTION, SOLUTION INTRAVENOUS at 11:50

## 2018-09-04 RX ADMIN — SODIUM CHLORIDE 100 ML/HR: 4.5 INJECTION, SOLUTION INTRAVENOUS at 21:04

## 2018-09-04 RX ADMIN — PROPRANOLOL HYDROCHLORIDE 10 MG: 10 TABLET ORAL at 11:21

## 2018-09-04 RX ADMIN — SODIUM BICARBONATE 650 MG: 650 TABLET ORAL at 11:20

## 2018-09-04 RX ADMIN — Medication 1 TABLET: at 11:20

## 2018-09-04 RX ADMIN — TAMSULOSIN HYDROCHLORIDE 0.4 MG: 0.4 CAPSULE ORAL at 11:20

## 2018-09-04 RX ADMIN — GUAIFENESIN 1200 MG: 600 TABLET, EXTENDED RELEASE ORAL at 11:21

## 2018-09-04 NOTE — THERAPY TREATMENT NOTE
Acute Care - Speech Language Pathology   Swallow Treatment Note [facility]     Patient Name: Ryan Alvarez  : 1950  MRN: [id number]  Today's Date: 2018  Onset of Illness/Injury or Date of Surgery: 18     Referring Physician: Dr. Salinas      Admit Date: 2018     Swallowing tx completed this AM. Upon ST arrival, pt naked, exposed in bed. No family initially present, yet sister did arrive shortly thereafter, stating pt has often been active during the night, with minimal sleep. Unable to awaken to mod verbal cues and sternal rub. Cold wash cloth applied to face and neck, with essentially no increased level of alertness. Pt was presented labial ice chip stimulation with spontaneous labial pucker noted around bolus in response to stim. Pt was also noted to complete a delayed swallow, felt to be in response to stim. RN arrived to present PO meds, yet ST and RN agreed that pt is too lethargic for PO intake at this time. Sister stated pt often is more alert for tx in the afternoon. Will attempt to re-visit this PM. Continue current diet of pureed with nectar thick liquids at this time, yet PO should only be provided IF PT IS ALERT. ST to monitor. Thanks! Chley Hoovre, CCC-SLP 2018 12:47 PM    Visit Dx:      ICD-10-CM ICD-9-CM   1. Subdural hematoma (CMS/HCC) I62.00 432.1   2. ТАТЬЯНА (acute kidney injury) (CMS/HCC) N17.9 584.9   3. Dehydration E86.0 276.51   4. Anemia, unspecified type D64.9 285.9   5. Non-traumatic rhabdomyolysis M62.82 728.88   6. Alcohol abuse F10.10 305.00   7. Fall, initial encounter W19.XXXA E888.9   8. Abrasions of multiple sites T07.XXXA 919.0   9. Elevated troponin R74.8 790.6   10. Impaired mobility and ADLs Z74.09 799.89   11. Generalized weakness R53.1 780.79   12. Oropharyngeal dysphagia R13.12 787.22     Patient Active Problem List   Diagnosis   • Subdural hematoma (CMS/HCC)   • Alcohol abuse   • ТАТЬЯНА (acute kidney injury) (CMS/HCC)   • Umbilical hernia   • Abrasions of  multiple sites   • Cirrhosis of liver (CMS/HCC)   • Non-traumatic rhabdomyolysis   • Dehydration   • Elevated troponin   • Anemia       Therapy Treatment    Therapy Treatment / Health Promotion    Treatment Time/Intention  Discipline: speech language pathologist (09/04/18 0838 : Chely Hoover CCC-SLP)  Document Type: therapy note (daily note) (09/04/18 0838 : Chely Hoover CCC-SLP)  Subjective Information: weakness, fatigue (09/04/18 0838 : Chely Hoover CCC-SLP)  Mode of Treatment: individual therapy, speech-language pathology (09/04/18 0838 : Chely Hoover CCC-SLP)  Patient/Family Observations: Pt's sister present at time of session, actively participating in session. (09/04/18 0838 : Chely Hoover CCC-SLP)  Care Plan Review: care plan/treatment goals reviewed, risks/benefits reviewed, patient/other agree to care plan (09/04/18 0838 : Chely Hoover CCC-SLP)  Care Plan Review, Other Participant(s): sibling, other (see comments) (As well as RN) (09/04/18 0838 : Chely Hoover, CCC-SLP)  Patient Effort: poor (09/04/18 0838 : Chely Hoover CCC-SLP)  Comment: Lethargic, essentially no increased level of alertness with max verbal and tactile cues, including cold wash cloth on face and neck. (09/04/18 0838 : Chely Hoover CCC-SLP)  Plan of Care Review  Plan of Care Reviewed With: sibling, other (see comments) (RN) (09/04/18 0838 : Chely Hoover CCC-SLP)    Vitals/Pain/Safety  Pain Assessment  Additional Documentation: Pain Scale: FACES Pre/Post-Treatment (Group) (09/04/18 0838 : Chely Hoover CCC-SLP)  Pain Scale: FACES Pre/Post-Treatment  Pain: FACES Scale, Pretreatment: 0-->no hurt (09/04/18 0838 : Chely Hoover CCC-SLP)  Pain: FACES Scale, Post-Treatment: 0-->no hurt (09/04/18 0838 : Hoover, Chely A, CCC-SLP)    Cognition, Communication, Swallow  Recommendations  Anticipated Dischage Disposition: unknown (09/04/18 0838 : Chely Hoover, CCC-SLP)    Outcome Summary  Outcome  Summary/Treatment Plan (SLP)  Daily Summary of Progress (SLP): unable to show any progress toward functional goals (09/04/18 0838 : Chely Hoover, CCC-SLP)  Barriers to Overall Progress (SLP): Lethargy  (09/04/18 0838 : Chely Hoover, CCC-SLP)  Plan for Continued Treatment (SLP): ST to continue to follow and tx. (09/04/18 0838 : Chely Hoover, CCC-SLP)  Anticipated Dischage Disposition: unknown (09/04/18 0838 : Chely Hoover, CCC-SLP)            SLP GOALS     Row Name 09/04/18 0838 09/02/18 1130 09/01/18 1415       Oral Nutrition/Hydration Goal 1 (SLP)    Oral Nutrition/Hydration Goal 1, SLP Pt will tolerate LRD w/o any overt s/s of aspiration.  -TM Pt will tolerate LRD w/o any overt s/s of aspiration.  -CS Pt will tolerate LRD w/o any overt s/s of aspiration.  -CS    Time Frame (Oral Nutrition/Hydration Goal 1, SLP) by discharge  -TM by discharge  -CS by discharge  -CS    Barriers (Oral Nutrition/Hydration Goal 1, SLP) Lethargy, cognitive status  -TM cognitive status  -CS cognitive status  -CS    Progress/Outcomes (Oral Nutrition/Hydration Goal 1, SLP) unable to make needed progress  -TM continuing progress toward goal  -CS goal ongoing  -CS      User Key  (r) = Recorded By, (t) = Taken By, (c) = Cosigned By    Initials Name Provider Type    TM Chely Hoover, CCC-SLP Speech and Language Pathologist    Elia Prado, MS CCC-SLP Speech and Language Pathologist          EDUCATION  The patient has been educated in the following areas:   Dysphagia (Swallowing Impairment).    SLP Recommendation and Plan                       Anticipated Dischage Disposition: unknown                Plan of Care Reviewed With: sibling, other (see comments) (RN)  Plan of Care Review  Plan of Care Reviewed With: sibling, other (see comments) (RN)  Daily Summary of Progress (SLP): unable to show any progress toward functional goals  Plan for Continued Treatment (SLP): ST to continue to follow and tx.  Progress: no change  Outcome  Summary: Swallowing tx completed this AM. Upon ST arrival, pt naked, exposed in bed. No family initially present, yet sister did arrive shortly thereafter, stating pt has often been active during the night, with minimal sleep. Unable to awaken to mod verbal cues and sternal rub. Cold wash cloth applied to face and neck, with essentially no increased level of alertness. Pt was presented labial ice chip stimulation with spontaneous labial pucker noted around bolus in response to stim. Pt was also noted to complete a delayed swallow, felt to be in response to stim. RN arrived to present PO meds, yet ST and RN agreed that pt is too lethargic for PO intake at this time. Sister stated pt often is more alert for tx in the afternoon. Will attempt to re-visit this PM. Continue current diet of pureed with nectar thick liquids at this time, yet PO should only be provided IF PT IS ALERT. ST to monitor. Thanks!        SLP Outcome Measures (last 72 hours)      SLP Outcome Measures     Row Name 09/01/18 1415             SLP Outcome Measures    Outcome Measure Used? Adult NOMS  -CS         Adult FCM Scores    FCM Chosen Swallowing  -CS      Swallowing FCM Score 2  -        User Key  (r) = Recorded By, (t) = Taken By, (c) = Cosigned By    Initials Name Effective Dates     Elia Leigh W, MS CCC-SLP 04/03/18 -              Time Calculation:         Time Calculation- SLP     Row Name 09/04/18 1246             Time Calculation- SLP    SLP Start Time 0838  -TM      SLP Stop Time 0854  -      SLP Time Calculation (min) 16 min  -      SLP Received On 09/04/18  -        User Key  (r) = Recorded By, (t) = Taken By, (c) = Cosigned By    Initials Name Provider Type     Chely Hoover CCC-SLP Speech and Language Pathologist          Therapy Charges for Today     Code Description Service Date Service Provider Modifiers Qty    [id number] [facility] TREATMENT SWALLOW 1 9/4/2018 Chely Hoover CCC-SLP GN, KX 1          SLP G-Codes  SLP  NOMS Used?: Yes  Functional Limitations: Swallowing  Swallow Current Status (): At least 80 percent but less than 100 percent impaired, limited or restricted  Swallow Goal Status (): At least 20 percent but less than 40 percent impaired, limited or restricted      Chely Hoover CCC-SLP  9/4/2018

## 2018-09-04 NOTE — PLAN OF CARE
Problem: Patient Care Overview  Goal: Plan of Care Review  Outcome: Ongoing (interventions implemented as appropriate)   09/04/18 7621   OTHER   Outcome Summary ST attempted to visit pt x2 this PM. At time of first attempt, in room procedure was being completed at that time. At time of second attempt, nursing staff completing bathing. ST to re-attempt visit this PM if possible to determine if pt level of alertness warrants change to NPO status. Thanks!

## 2018-09-04 NOTE — PROGRESS NOTES
[facility] Medicine Services  INPATIENT PROGRESS NOTE    Length of Stay: 9  Date of Admission: 8/25/2018  Primary Care Physician: Rufino Steevns APRN    Subjective     Chief Complaint:     Confusion    HPI     The patient's confusion persists and is mutifactorial.  He has had no by mouth intake in the past 24-36 hours.  The patient's renal function is declining briskly.  Creatinine is 2.34 today despite IV fluids.  There is concern for hepatorenal syndrome.  I had a very long discussion with both the patient's son and daughter today.  We discussed the patient's decline and inability to eat and drink. We spoke about PEG tube placement and further aggressive treatment.  They state that their father would never have wanted an artificial means of nutrition or life support.  They understand that without nutrition and hydration the patient's decline and demise is inevitable.  They would like to transition their father to palliative care and likely comfort measures in the future.  They understand that without aggressive treatment and nutrition he will not survive.     Review of Systems     All pertinent negatives and positives are as above. All other systems have been reviewed and are negative unless otherwise stated.     Objective    Temp:  [96.7 °F (35.9 °C)-98.5 °F (36.9 °C)] 96.7 °F (35.9 °C)  Heart Rate:  [52-93] 52  Resp:  [18-20] 20  BP: ()/(41-62) 70/48    Lab Results (last 24 hours)     Procedure Component Value Units Date/Time    AFB Culture - Body Fluid, Pleural Cavity [[id number]]  (Normal) Collected:  08/30/18 1342    Specimen:  Body Fluid from Pleural Cavity Updated:  09/04/18 1416     AFB Culture No AFB isolated at less than 1 week     AFB Stain No acid fast bacilli seen on direct smear      No acid fast bacilli seen on concentrated smear    Fungus Culture - Body Fluid, Pleural Cavity [[id number]] Collected:  08/30/18 1342    Specimen:  Body Fluid from Pleural  Cavity Updated:  09/04/18 1416     Fungus Culture No fungus isolated at less than 1 week    Comprehensive Metabolic Panel [[id number]]  (Abnormal) Collected:  09/04/18 1103    Specimen:  Blood Updated:  09/04/18 1122     Glucose 77 mg/dL      BUN 39 (H) mg/dL      Creatinine 2.34 (H) mg/dL      Sodium 147 (H) mmol/L      Potassium 5.2 mmol/L      Chloride 115 (H) mmol/L      CO2 22.0 (L) mmol/L      Calcium 9.1 mg/dL      Total Protein 6.3 g/dL      Albumin 2.80 (L) g/dL      ALT (SGPT) 39 U/L      AST (SGOT) 50 (H) U/L      Alkaline Phosphatase 93 U/L      Total Bilirubin 0.8 mg/dL      eGFR Non African Amer 28 (L) mL/min/1.73      Globulin 3.5 gm/dL      A/G Ratio 0.8 (L) g/dL      BUN/Creatinine Ratio 16.7     Anion Gap 10.0 mmol/L     Anaerobic Culture - Pleural Fluid, Pleural Cavity [[id number]]  (Normal) Collected:  08/30/18 1342    Specimen:  Pleural Fluid from Pleural Cavity Updated:  09/04/18 1024     Culture No anaerobes isolated at 5 days    Body Fluid Culture - Body Fluid, Pleural Cavity [[id number]]  (Normal) Collected:  08/30/18 1342    Specimen:  Body Fluid from Pleural Cavity Updated:  09/04/18 0637     BF Culture No growth at 5 days     Gram Stain Result Moderate (3+) WBCs seen      No organisms seen    Non-gynecologic Cytology [[id number]] Collected:  08/30/18 1342    Specimen:  Body Fluid from Pleural Cavity Updated:  09/03/18 1710     Case Report --     Non-gynecologic Cytology                          Case: [id number]                                  Authorizing Provider:  Bennett Manzo DO        Collected:           08/30/2018 01:42 PM          Ordering Location:     [address]  Received:            08/30/2018 02:44 PM          Pathologist:           Tosin Arora MD                                                         Specimen:    Pleural Cavity                                                                              Final Diagnosis --     Pleural fluid,  thoracentesis:  Negative for malignancy.  Benign reactive mesothelial cells and macrophages.  Mixed inflammation.       Gross Description --     Received fresh in the laboratory in a container labeled with patient name and  designated as pleural fluid.  1400 mls of yellow fluid.  1 thin prep slide and 1 cell block made.         Microscopic Description --     ThinPrep and cell block reveal benign reactive mesothelial cells intermixed macrophages and mixed inflammation.  Negative for malignancy.            Imaging Results (last 24 hours)     ** No results found for the last 24 hours. **             Intake/Output Summary (Last 24 hours) at 18 1703  Last data filed at 18 0426   Gross per 24 hour   Intake               40 ml   Output              150 ml   Net             -110 ml       Physical Exam    Constitutional: The patient is currently lying in bed.  He is confused.  The patient's sister is present in the room.  Head: Normocephalic and atraumatic.   Eyes: Pupils are equal, round, and reactive to light. Conjunctivae and EOM are normal.   Neck: Neck supple. No JVD present.   Cardiovascular: Normal rate, regular rhythm, normal heart sounds and intact distal pulses.  Exam reveals no gallop and no friction rub. No murmur heard.  Pulmonary/Chest: Effort normal. No respiratory distress. Diminished breath sounds in the right lung base. Remains on room air.    Abdominal: Soft. Bowel sounds are normal. He exhibits distension (softer today). There is no tenderness. There is no rebound and no guarding. A hernia (reducible umbilical) is present.   Musculoskeletal: Normal range of motion. He exhibits no edema, tenderness or deformity.   Neurological: Awake, alert. Oriented to person and place. He displays normal reflexes. No cranial nerve deficit. He exhibits normal muscle tone. No tremor.  Generalized weakness, nonfocal.   Skin: Skin is warm and dry. No rash noted. Widespread healing abrasions and bruises.     Psychiatric: He has a normal mood and affect. His behavior is normal. Judgment and thought content normal.    Results Review:  I have reviewed the labs, radiology results, and diagnostic studies since my last progress note and made treatment changes reflective of the results.   I have reviewed the current medications.    Assessment/Plan     Hospital Problem List     * (Principal)Subdural hematoma (CMS/HCC)    ТАТЬЯНА (acute kidney injury) (CMS/HCC), worsening (Hepatorenal syndrome?)    Non-traumatic rhabdomyolysis    Abrasions of multiple sites    Dehydration    Elevated troponin    Anemia    Alcohol abuse    Umbilical hernia    Cirrhosis of liver (CMS/HCC)          PLAN:  Transition to palliative care at the request of the patient's son and daughter  Transfer to SNF for palliative care when the family agrees (Wednesday or Thursday)    Den Elena DO   09/04/18   5:03 PM

## 2018-09-04 NOTE — PLAN OF CARE
Problem: Patient Care Overview  Goal: Plan of Care Review  Outcome: Ongoing (interventions implemented as appropriate)   09/04/18 1527   Coping/Psychosocial   Plan of Care Reviewed With sibling   Plan of Care Review   Progress no change   OTHER   Outcome Summary Attempted PO trials with pt. Pt was lethargic and kept his eyes closed throughout most of the session. He did mumble a few responses, but was difficult to understand. SLP presented an ice chip via spoon. Pt had minimal opening of his mouth and then bit down on the spoon, but did not attempt to obtain the ice. He eventually opened his mouth a little more after being given verbal cues, but had poor manipulation of the ice and never definitely initiated a swallow. He had similar results when presented with a small amount of honey thick liquid via spoon. He had a very delayed swallow with weak laryngeal elevation and a wet vocal quality. Pt was not appropriate for any further PO trials. Recommend NPO status until family makes decisions about how aggressive they want to be re: nutrition.

## 2018-09-04 NOTE — PLAN OF CARE
Problem: Fall Risk (Adult)  Goal: Absence of Fall  Outcome: Ongoing (interventions implemented as appropriate)      Problem: Skin Injury Risk (Adult)  Goal: Skin Health and Integrity  Outcome: Ongoing (interventions implemented as appropriate)      Problem: Patient Care Overview  Goal: Plan of Care Review  Outcome: Ongoing (interventions implemented as appropriate)   09/04/18 0341   Coping/Psychosocial   Plan of Care Reviewed With patient;sibling   Plan of Care Review   Progress no change   OTHER   Outcome Summary Pt is still very drowsy, becomes more alert at times. Was able to answer simple questions as the shift went on. Pt is also swallowing better than he was. Was able to take most of his pills crushed in applesauce. Refused some. FC care done. Rash on face and abd. Pt c/o generalized pain/pain in hands and feet. Prn po tylenol given. Pt's sister has been applying oatmeal lotion and tea tree oil to hands and feet. Safety maintained. Will continue to monitor.      Goal: Individualization and Mutuality  Outcome: Ongoing (interventions implemented as appropriate)    Goal: Discharge Needs Assessment  Outcome: Ongoing (interventions implemented as appropriate)    Goal: Interprofessional Rounds/Family Conf  Outcome: Ongoing (interventions implemented as appropriate)      Problem: Alcohol Withdrawal Acute, Risk/Actual (Adult)  Goal: Signs and Symptoms of Listed Potential Problems Will be Absent, Minimized or Managed (Alcohol Withdrawal Acute, Risk/Actual)  Outcome: Ongoing (interventions implemented as appropriate)      Problem: Nutrition, Imbalanced: Inadequate Oral Intake (Adult)  Goal: Improved Oral Intake  Outcome: Ongoing (interventions implemented as appropriate)      Problem: Brain Injury, Moderate Traumatic (GCS 9-12) (Adult)  Goal: Signs and Symptoms of Listed Potential Problems Will be Absent, Minimized or Managed (Brain Injury, Moderate Traumatic)  Outcome: Ongoing (interventions implemented as  appropriate)

## 2018-09-04 NOTE — THERAPY TREATMENT NOTE
Acute Care - Physical Therapy Treatment Note  [facility]     Patient Name: Ryan Alvarez  : 1950  MRN: [id number]  Today's Date: 2018  Onset of Illness/Injury or Date of Surgery: 18  Date of Referral to PT: 18  Referring Physician: Dr. Salinas    Admit Date: 2018    Visit Dx:    ICD-10-CM ICD-9-CM   1. Subdural hematoma (CMS/HCC) I62.00 432.1   2. ТАТЬЯНА (acute kidney injury) (CMS/HCC) N17.9 584.9   3. Dehydration E86.0 276.51   4. Anemia, unspecified type D64.9 285.9   5. Non-traumatic rhabdomyolysis M62.82 728.88   6. Alcohol abuse F10.10 305.00   7. Fall, initial encounter W19.XXXA E888.9   8. Abrasions of multiple sites T07.XXXA 919.0   9. Elevated troponin R74.8 790.6   10. Impaired mobility and ADLs Z74.09 799.89   11. Generalized weakness R53.1 780.79   12. Oropharyngeal dysphagia R13.12 787.22     Patient Active Problem List   Diagnosis   • Subdural hematoma (CMS/HCC)   • Alcohol abuse   • ТАТЬЯНА (acute kidney injury) (CMS/HCC)   • Umbilical hernia   • Abrasions of multiple sites   • Cirrhosis of liver (CMS/HCC)   • Non-traumatic rhabdomyolysis   • Dehydration   • Elevated troponin   • Anemia       Therapy Treatment          Rehabilitation Treatment Summary     Row Name 18 1354 18 0838 18 0752       Treatment Time/Intention    Discipline physical therapy assistant  - speech language pathologist  -TM occupational therapy assistant  -TS    Document Type therapy note (daily note)  -[facility] therapy note (daily note)  - --  -TS2    Subjective Information no complaints   pt very sleepy,difficult to understand  -[facility] weakness;fatigue  -  --    Mode of Treatment  -- individual therapy;speech-language pathology  -  --    Patient/Family Observations  -- Pt's sister present at time of session, actively participating in session.  -  --    Care Plan Review  -- care plan/treatment goals reviewed;risks/benefits reviewed;patient/other agree to care plan  -  --    Care Plan Review,  Other Participant(s)  -- sibling;other (see comments)   As well as RN  -TM  --    Patient Effort  -- poor  -TM  --    Comment  -- Lethargic, essentially no increased level of alertness with max verbal and tactile cues, including cold wash cloth on face and neck.  -TM Pt very lethargic, does not alert to stimulus. Restless in bed but keeps eyes closed. Pt moves BUE but no purposeful movement noted. OT will continue to check on and will work with as pt mental state improves   -TS2    Reason Treatment Not Performed  --  -- other (see comments)  -TS2    Existing Precautions/Restrictions fall  -2  --  --    Recorded by [] Gena Kaur, PTA 09/04/18 1354  [AH2] Gena Kaur, [facility] 09/04/18 1414 [TM] Chely Hoover CCC-SLP 09/04/18 1225 [TS] Ena Loera LYNN/L 09/04/18 0753  [TS2] Ena Loera, LYNN/L 09/04/18 0809    Row Name 09/04/18 1354             General ROM    GENERAL ROM COMMENTS P-AAROM all four extremities,mostly PROM,   -AH      Recorded by [] Gena Kaur, PTA 09/04/18 1414      Row Name 09/04/18 1354             Positioning and Restraints    Pre-Treatment Position in bed  -AH      Post Treatment Position bed  -AH      In Bed fowlers;call light within reach;encouraged to call for assist;exit alarm on;with family/caregiver;SCD pump applied  -      Recorded by [] Gena Kaur, PTA 09/04/18 1414      Row Name 09/04/18 0849             Pain Assessment    Additional Documentation Pain Scale: FACES Pre/Post-Treatment (Group)  -TM      Recorded by [TM] Chely Hoover CCC-SLP 09/04/18 1225      Row Name 09/04/18 1354 09/04/18 0838          Pain Scale: FACES Pre/Post-Treatment    Pain: FACES Scale, Pretreatment 0-->no hurt  -AH 0-->no hurt  -TM     Pain: FACES Scale, Post-Treatment  -- 0-->no hurt  -TM     Recorded by [] Gena Kaur, PTA 09/04/18 1414 [TM] Chely Hoover, CCC-SLP 09/04/18 1225     Row Name                Wound 08/26/18 0213 Left  abrasion    Wound -  Properties Group Date first assessed: 08/26/18 [SB] Time first assessed: 0213 [SB] Present On Admission : yes [SB] Side: Left [SB] Type: abrasion [SB] Recorded by:  [SB] Corrina Dbobs RN 08/26/18 0341    Row Name                Wound 08/26/18 0213 Left knee abrasion    Wound - Properties Group Date first assessed: 08/26/18 [SB] Time first assessed: 0213 [SB] Side: Left [SB] Location: knee [SB] Type: abrasion [SB] Recorded by:  [SB] Corrina Dobbs RN 08/26/18 0344    Row Name                Wound 08/28/18 2000 Left ankle abrasion    Wound - Properties Group Date first assessed: 08/28/18 [AR] Time first assessed: 2000 [AR] Present On Admission : yes [AR] Side: Left [AR] Location: ankle [AR] Type: abrasion [AR] Recorded by:  [AR] Glory Castorena RN 08/29/18 0104    Row Name                Wound 08/28/18 2000 Left heel abrasion    Wound - Properties Group Date first assessed: 08/28/18 [AR] Time first assessed: 2000 [AR] Present On Admission : yes [AR] Side: Left [AR] Location: heel [AR] Type: abrasion [AR] Recorded by:  [AR] Glory Castorena RN 08/29/18 0105    Row Name                Wound 08/28/18 2000 Right ankle abrasion    Wound - Properties Group Date first assessed: 08/28/18 [AR] Time first assessed: 2000 [AR] Present On Admission : yes [AR] Side: Right [AR] Location: ankle [AR] Type: abrasion [AR] Recorded by:  [AR] Glory Castorena RN 08/29/18 0106    Row Name                Wound 08/28/18 2000 Right heel abrasion    Wound - Properties Group Date first assessed: 08/28/18 [AR] Time first assessed: 2000 [AR] Present On Admission : yes [AR] Side: Right [AR] Location: heel [AR] Type: abrasion [AR] Recorded by:  [AR] Glory Castorena RN 08/29/18 0107    Row Name                Wound 08/31/18 2000 Left upper back abrasion    Wound - Properties Group Date first assessed: 08/31/18 [FI] Time first assessed: 2000 [FI] Present On Admission : no [FI] Side: Left [FI] Orientation: upper [FI] Location:  back [FI] Type: abrasion [FI2] Recorded by:  [FI] Dina Thomson, RNA 08/31/18 2152 [FI2] Dina Thomson, RNA 08/31/18 2155    Row Name                Wound 08/31/18 2000 lower back abrasion    Wound - Properties Group Date first assessed: 08/31/18 [AR] Time first assessed: 2000 [AR] Present On Admission : picture taken [AR] Orientation: lower [AR] Location: back [AR] Type: abrasion [AR] Recorded by:  [AR] Glory Castorena RN 09/01/18 0444    Row Name                Wound 08/31/18 2000 Right hip abrasion    Wound - Properties Group Date first assessed: 08/31/18 [AR] Time first assessed: 2000 [AR] Present On Admission : picture taken [AR] Side: Right [AR] Location: hip [AR] Type: abrasion [AR] Recorded by:  [AR] Glory Castorena RN 09/01/18 0447    Row Name 09/04/18 0838             Outcome Summary/Treatment Plan (SLP)    Daily Summary of Progress (SLP) unable to show any progress toward functional goals  -TM      Barriers to Overall Progress (SLP) Lethargy   -TM      Plan for Continued Treatment (SLP) ST to continue to follow and tx.  -TM      Anticipated Dischage Disposition unknown  -TM      Recorded by [TM] Chely Hoover CCC-SLP 09/04/18 1225        User Key  (r) = Recorded By, (t) = Taken By, (c) = Cosigned By    Initials Name Effective Dates Discipline     Gena Kaur, PTA 08/02/16 -  PT    TM Chely Hoover CCC-SLP 08/02/16 -  SLP    TS Ena Loera COTA/L 08/02/16 -  OT    AR Glory Castorena RN 08/02/16 -  Nurse    Corrina Xie RN 12/15/17 -  Nurse    Dina Salgado, RNA 01/16/18 -  Nurse          Wound 08/26/18 0213 Left  abrasion (Active)   Dressing Appearance dried drainage 9/4/2018  8:00 AM   Closure LUIS FERNANDO 9/4/2018  8:00 AM   Base dressing in place, unable to visualize;other (see comments) 9/4/2018  8:00 AM   Periwound intact;dry 9/4/2018  8:00 AM   Drainage Amount scant 9/4/2018  8:00 AM   Dressing Care, Wound open to air 9/4/2018  8:00 AM       Wound 08/26/18 021  Left knee abrasion (Active)   Dressing Appearance open to air 9/4/2018  8:00 AM   Closure None 9/4/2018  8:00 AM   Base scab 9/4/2018  8:00 AM   Periwound blanchable;intact 9/4/2018  8:00 AM   Drainage Amount none 9/4/2018  8:00 AM   Dressing Care, Wound open to air 9/4/2018  8:00 AM       Wound 08/28/18 2000 Left ankle abrasion (Active)   Dressing Appearance dry;intact 9/4/2018  8:00 AM   Closure LUIS FERNANDO 9/4/2018  8:00 AM   Base dressing in place, unable to visualize 9/4/2018  8:00 AM   Drainage Amount none 9/4/2018  8:00 AM   Dressing Care, Wound foam 9/4/2018  8:00 AM       Wound 08/28/18 2000 Left heel abrasion (Active)   Dressing Appearance open to air 9/4/2018  8:00 AM   Closure Open to air 9/4/2018  8:00 AM   Base dry;scab 9/4/2018  8:00 AM   Drainage Amount none 9/4/2018  8:00 AM   Dressing Care, Wound open to air 9/4/2018  8:00 AM       Wound 08/28/18 2000 Right ankle abrasion (Active)   Dressing Appearance dry;intact 9/4/2018  8:00 AM   Closure LUIS FERNANDO 9/4/2018  8:00 AM   Base dressing in place, unable to visualize 9/4/2018  8:00 AM   Drainage Amount none 9/4/2018  8:00 AM   Dressing Care, Wound foam 9/4/2018  8:00 AM       Wound 08/28/18 2000 Right heel abrasion (Active)   Dressing Appearance open to air 9/4/2018  8:00 AM   Closure Open to air 9/4/2018  8:00 AM   Base dry;slough 9/4/2018  8:00 AM   Drainage Amount none 9/4/2018  8:00 AM   Dressing Care, Wound open to air 9/4/2018  8:00 AM       Wound 08/31/18 2000 Left upper back abrasion (Active)   Dressing Appearance open to air 9/4/2018  8:00 AM   Base scab 9/4/2018  8:00 AM   Drainage Amount none 9/4/2018  8:00 AM   Dressing Care, Wound open to air 9/4/2018  8:00 AM       Wound 08/31/18 2000 lower back abrasion (Active)   Dressing Appearance open to air 9/4/2018  8:00 AM   Base dry;pink 9/4/2018  8:00 AM   Drainage Amount none 9/4/2018  8:00 AM       Wound 08/31/18 2000 Right hip abrasion (Active)   Dressing Appearance open to air 9/4/2018  8:00 AM   Base  pink;dry 9/4/2018  8:00 AM   Drainage Amount none 9/4/2018  8:00 AM   Dressing Care, Wound open to air 9/4/2018  8:00 AM             Physical Therapy Education     Title: PT OT SLP Therapies (Active)     Topic: Physical Therapy (Active)     Point: Mobility training (Active)    Learning Progress Summary     Learner Status Readiness Method Response Comment Documented by    Patient Active Acceptance E NR,NL BOA  09/03/18 0934     Active Acceptance E NR BOA  09/02/18 1138     Active Acceptance E,D NR Safety with transfers and gait, benefits of activity  09/01/18 0939     Done Acceptance E,TB,D VU,DU,NR Education for safety/falls prevention with activity  Education for improved technique with bed mobility, transfers, and taking steps at bedside to reduce risk for LOB/falls  08/27/18 1201    Family Active Acceptance E,D NR Safety with transfers and gait, benefits of activity  09/01/18 0939          Point: Precautions (Done)    Learning Progress Summary     Learner Status Readiness Method Response Comment Documented by    Patient Done Acceptance E,TBD VU,DU,NR Education for safety/falls prevention with activity  Education for improved technique with bed mobility, transfers, and taking steps at bedside to reduce risk for LOB/falls  08/27/18 1201    Family Done Acceptance E,MOODY NEW VU positioning  09/04/18 1414                      User Key     Initials Effective Dates Name Provider Type Discipline     08/02/16 -  Gena Kaur PTA Physical Therapy Assistant PT     08/02/16 -  Aysha Tineo PTA Physical Therapy Assistant PT     08/02/18 -  Marilia Farrar, PT Physical Therapist PT                    PT Recommendation and Plan     Plan of Care Reviewed With: patient  Progress: no change  Outcome Summary: pt very sleepy/lethargic,kept eyes closed during ROM,would arouse to his name briefly,pt did not follow commands , performed P-AAROM, mostly PROM. pt will need SNF          Outcome Measures      Row Name 09/04/18 1400 09/03/18 0858 09/02/18 1057       How much help from another person do you currently need...    Turning from your back to your side while in flat bed without using bedrails? 1  - 2  -TR 2  -TR    Moving from lying on back to sitting on the side of a flat bed without bedrails? 1  - 2  -TR 2  -TR    Moving to and from a bed to a chair (including a wheelchair)? 1  - 2  -TR 2  -TR    Standing up from a chair using your arms (e.g., wheelchair, bedside chair)? 1  - 2  -TR 2  -TR    Climbing 3-5 steps with a railing? 1  - 1  -TR 1  -TR    To walk in hospital room? 1  - 2  -TR 2  -TR    AM-PAC 6 Clicks Score 6  -AH 11  -TR 11  -TR       Functional Assessment    Outcome Measure Options AM-PAC 6 Clicks Basic Mobility (PT)  - AM-PAC 6 Clicks Basic Mobility (PT)  -TR AM-PAC 6 Clicks Basic Mobility (PT)  -TR      User Key  (r) = Recorded By, (t) = Taken By, (c) = Cosigned By    Initials Name Provider Type    Gena Dickens PTA Physical Therapy Assistant    Aysha Ruby PTA Physical Therapy Assistant           Time Calculation:         PT Charges     Row Name 09/04/18 1423             Time Calculation    Start Time 1354  -      Stop Time 1417  -      Time Calculation (min) 23 min  -      PT Received On 09/04/18  -      PT Goal Re-Cert Due Date 09/06/18  -         Time Calculation- PT    Total Timed Code Minutes- PT 23 minute(s)  -         Timed Charges    56251 - PT Therapeutic Exercise Minutes 23  -AH        User Key  (r) = Recorded By, (t) = Taken By, (c) = Cosigned By    Initials Name Provider Type    Gena Dickens PTA Physical Therapy Assistant        Therapy Suggested Charges     Code   Minutes Charges    10417 (CPT®) Hc Pt Neuromusc Re Education Ea 15 Min      73038 (CPT®) Hc Pt Ther Proc Ea 15 Min 23 2    95813 (CPT®) Hc Gait Training Ea 15 Min      17316 (CPT®) Hc Pt Therapeutic Act Ea 15 Min      71609 (CPT®) Hc Pt Manual Therapy Ea 15 Min      81176  (CPT®) Hc Pt Iontophoresis Ea 15 Min      17937 (CPT®) Hc Pt Elec Stim Ea-Per 15 Min      45754 (CPT®) Hc Pt Ultrasound Ea 15 Min      64758 (CPT®) Hc Pt Self Care/Mgmt/Train Ea 15 Min      12193 (CPT®) Hc Pt Prosthetic (S) Train Initial Encounter, Each 15 Min      16561 (CPT®) Hc Pt Orthotic(S)/Prosthetic(S) Encounter, Each 15 Min      53021 (CPT®) Hc Orthotic(S) Mgmt/Train Initial Encounter, Each 15min      Total  23 2        Therapy Charges for Today     Code Description Service Date Service Provider Modifiers Qty    09600748364 HC PT THER PROC EA 15 MIN 9/4/2018 Gena Kaur, PTA GP, KX 2          PT G-Codes  PT Professional Judgement Used?: Yes  Outcome Measure Options: AM-PAC 6 Clicks Basic Mobility (PT)  AM-PAC 6 Clicks Score: 6  Score: 18  Functional Limitation: Mobility: Walking and moving around  Mobility: Walking and Moving Around Current Status (): At least 40 percent but less than 60 percent impaired, limited or restricted  Mobility: Walking and Moving Around Goal Status (): At least 20 percent but less than 40 percent impaired, limited or restricted    Gena Kaur, PTA  9/4/2018

## 2018-09-04 NOTE — PROGRESS NOTES
Ryan Alvarez  68 y.o.      Subjective  No family present at bedside    Temp:  [97 °F (36.1 °C)-98.5 °F (36.9 °C)] 98.3 °F (36.8 °C)  Heart Rate:  [58-93] 58  Resp:  [18-20] 20  BP: ()/(48-72) 93/61      Objective    Neurologic Exam    Lethargic  Opens eyes to voice  Oriented to name/location  Dysarthric speech  Follows commands slowly  SINGH - wiggles toes, raises arms to command    Principal Problem:    Subdural hematoma (CMS/HCC)  Active Problems:    Alcohol abuse    ТАТЬЯНА (acute kidney injury) (CMS/HCC)    Umbilical hernia    Abrasions of multiple sites    Cirrhosis of liver (CMS/HCC)    Non-traumatic rhabdomyolysis    Dehydration    Elevated troponin    Anemia      Lab Results (last 24 hours)     Procedure Component Value Units Date/Time    Anaerobic Culture - Pleural Fluid, Pleural Cavity [[id number]]  (Normal) Collected:  08/30/18 1342    Specimen:  Pleural Fluid from Pleural Cavity Updated:  09/04/18 1024     Culture No anaerobes isolated at 5 days    Body Fluid Culture - Body Fluid, Pleural Cavity [[id number]]  (Normal) Collected:  08/30/18 1342    Specimen:  Body Fluid from Pleural Cavity Updated:  09/04/18 0637     BF Culture No growth at 5 days     Gram Stain Result Moderate (3+) WBCs seen      No organisms seen    Non-gynecologic Cytology [[id number]] Collected:  08/30/18 1342    Specimen:  Body Fluid from Pleural Cavity Updated:  09/03/18 1710     Case Report --     Non-gynecologic Cytology                          Case: [id number]                                  Authorizing Provider:  Bennett Manzo DO        Collected:           08/30/2018 01:42 PM          Ordering Location:     [address]  Received:            08/30/2018 02:44 PM          Pathologist:           Tosin Arora MD                                                         Specimen:    Pleural Cavity                                                                              Final Diagnosis --     Pleural fluid,  thoracentesis:  Negative for malignancy.  Benign reactive mesothelial cells and macrophages.  Mixed inflammation.       Gross Description --     Received fresh in the laboratory in a container labeled with patient name and  designated as pleural fluid.  1400 mls of yellow fluid.  1 thin prep slide and 1 cell block made.         Microscopic Description --     ThinPrep and cell block reveal benign reactive mesothelial cells intermixed macrophages and mixed inflammation.  Negative for malignancy.                Plan:        68-year-old male with history of alcohol abuse and liver cirrhosis found down after 3 days     1) Right subdural hematoma - wax/wane mental status during hospital course, but imaging shows stable hematoma size.  If subdural hematoma enlarges and causes neurologic deficit, discussed with patient and he wishes to proceed with surgery but as last resort with his higher surgical risks.  At this time, hopefully the subdural hematoma will improve without surgical intervention given liver cirrhosis, acute kidney injury, rhabdomyolysis.  Encephalopathy likely multifactorial with multiple medical comorbidities given stable imaging findings.      2) Right frontal lobe calcification with intraparenchymal hemorrhage - MRI shows cavernous malformation     3) Obtain EEG today to evaluate for subclinical seizure      Alireza Salinas MD

## 2018-09-04 NOTE — PLAN OF CARE
Problem: Patient Care Overview  Goal: Plan of Care Review  Outcome: Ongoing (interventions implemented as appropriate)   09/04/18 1415   Coping/Psychosocial   Plan of Care Reviewed With patient   Plan of Care Review   Progress no change   OTHER   Outcome Summary pt very sleepy/lethargic,kept eyes closed during ROM,would arouse to his name briefly,pt did not follow commands , performed P-AAROM, mostly PROM. pt will need SNF

## 2018-09-04 NOTE — THERAPY TREATMENT NOTE
Acute Care - Speech Language Pathology   Swallow Treatment Note [facility]     Patient Name: Ryan Alvarez  : 1950  MRN: [id number]  Today's Date: 2018  Onset of Illness/Injury or Date of Surgery: 18     Referring Physician: Dr. Salinas      Admit Date: 2018  Attempted PO trials with pt. Pt was lethargic and kept his eyes closed throughout most of the session. He did mumble a few responses, but was difficult to understand. SLP presented an ice chip via spoon. Pt had minimal opening of his mouth and then bit down on the spoon, but did not attempt to obtain the ice. He eventually opened his mouth a little more after being given verbal cues, but had poor manipulation of the ice and never definitely initiated a swallow. He had similar results when presented with a small amount of honey thick liquid via spoon. He had a very delayed swallow with weak laryngeal elevation and a wet vocal quality. Pt was not appropriate for any further PO trials. Recommend NPO status until family makes decisions about how aggressive they want to be re: nutrition.   Eugenio Piedra, CCC-SLP 2018 3:34 PM    Visit Dx:      ICD-10-CM ICD-9-CM   1. Subdural hematoma (CMS/HCC) I62.00 432.1   2. ТАТЬЯНА (acute kidney injury) (CMS/HCC) N17.9 584.9   3. Dehydration E86.0 276.51   4. Anemia, unspecified type D64.9 285.9   5. Non-traumatic rhabdomyolysis M62.82 728.88   6. Alcohol abuse F10.10 305.00   7. Fall, initial encounter W19.XXXA E888.9   8. Abrasions of multiple sites T07.XXXA 919.0   9. Elevated troponin R74.8 790.6   10. Impaired mobility and ADLs Z74.09 799.89   11. Generalized weakness R53.1 780.79   12. Oropharyngeal dysphagia R13.12 787.22     Patient Active Problem List   Diagnosis   • Subdural hematoma (CMS/HCC)   • Alcohol abuse   • ТАТЬЯНА (acute kidney injury) (CMS/HCC)   • Umbilical hernia   • Abrasions of multiple sites   • Cirrhosis of liver (CMS/HCC)   • Non-traumatic rhabdomyolysis   • Dehydration   • Elevated  troponin   • Anemia       Therapy Treatment    Therapy Treatment / Health Promotion    Treatment Time/Intention  Discipline: speech language pathologist (09/04/18 1504 : Eugenio Piedra CCC-SLP)  Document Type: therapy note (daily note) (09/04/18 1504 : Eugenio Piedra CCC-SLP)  Subjective Information:  (Pt minimally responsive) (09/04/18 1504 : Eugenio Piedra CCC-SLP)  Mode of Treatment: speech-language pathology (09/04/18 1504 : Eugenio Piedra CCC-SLP)  Patient/Family Observations: Pt's sister present at time of session, actively participating in session. (09/04/18 0838 : Chely Hoover CCC-SLP)  Care Plan Review: care plan/treatment goals reviewed, risks/benefits reviewed, patient/other agree to care plan (09/04/18 0838 : Chely Hoover CCC-SLP)  Care Plan Review, Other Participant(s): sibling, other (see comments) (As well as RN) (09/04/18 0838 : Chely Hoover, CCC-SLP)  Patient Effort: poor (09/04/18 1504 : Eugenio Piedra CCC-SLP)  Comment: Lethargic, essentially no increased level of alertness with max verbal and tactile cues, including cold wash cloth on face and neck. (09/04/18 0838 : Chely Hoover CCC-SLP)  Plan of Care Review  Plan of Care Reviewed With: sibling (09/04/18 1527 : Eugenio Piedra CCC-SLP)    Vitals/Pain/Safety  Pain Assessment  Additional Documentation: Pain Scale: FACES Pre/Post-Treatment (Group) (09/04/18 0838 : Chely Hoover CCC-SLP)  Pain Scale: FACES Pre/Post-Treatment  Pain: FACES Scale, Pretreatment: 2-->hurts little bit (09/04/18 1504 : Eugenio Piedra CCC-SLP)  Pain: FACES Scale, Post-Treatment: 0-->no hurt (09/04/18 0838 : Chely Hoover CCC-SLP)    Cognition, Communication, Swallow  Recommendations  Anticipated Dischage Disposition: unknown (09/04/18 1504 : Eugenio Piedra, CCC-SLP)    Outcome Summary  Outcome Summary/Treatment Plan (SLP)  Daily Summary of Progress (SLP): unable to show any progress toward functional goals (09/04/18  1504 : Eugenio Piedra, CCC-SLP)  Barriers to Overall Progress (SLP): Lethargy (09/04/18 1504 : Eugenio Piedra, CCC-SLP)  Plan for Continued Treatment (SLP): Change to NPO, continue to follow (09/04/18 1504 : Eugenio Piedra, CCC-SLP)  Anticipated Dischage Disposition: unknown (09/04/18 1504 : Eugenio Piedra CCC-SLP)            SLP GOALS     Row Name 09/04/18 1504 09/04/18 0838 09/02/18 1130       Oral Nutrition/Hydration Goal 1 (SLP)    Oral Nutrition/Hydration Goal 1, SLP Pt will tolerate LRD w/o any overt s/s of aspiration.  -MB Pt will tolerate LRD w/o any overt s/s of aspiration.  -TM Pt will tolerate LRD w/o any overt s/s of aspiration.  -CS    Time Frame (Oral Nutrition/Hydration Goal 1, SLP) by discharge  -MB by discharge  -TM by discharge  -CS    Barriers (Oral Nutrition/Hydration Goal 1, SLP) Lethargy, cognitive status  -MB Lethargy, cognitive status  -TM cognitive status  -CS    Progress/Outcomes (Oral Nutrition/Hydration Goal 1, SLP) unable to make needed progress  -MB unable to make needed progress  -TM continuing progress toward goal  -CS      User Key  (r) = Recorded By, (t) = Taken By, (c) = Cosigned By    Initials Name Provider Type    Eugenio Amador, CCC-SLP Speech and Language Pathologist    Chely Mcguire CCC-SLP Speech and Language Pathologist    CS Elia Leigh, MS CCC-SLP Speech and Language Pathologist          EDUCATION  The patient has been educated in the following areas:   Dysphagia (Swallowing Impairment).    SLP Recommendation and Plan                       Anticipated Dischage Disposition: unknown                Plan of Care Reviewed With: sibling  Plan of Care Review  Plan of Care Reviewed With: sibling  Daily Summary of Progress (SLP): unable to show any progress toward functional goals  Plan for Continued Treatment (SLP): Change to NPO, continue to follow  Progress: no change  Outcome Summary: Attempted PO trials with pt. Pt was lethargic and kept his  eyes closed throughout most of the session. He did mumble a few responses, but was difficult to understand. SLP presented an ice chip via spoon. Pt had minimal opening of his mouth and then bit down on the spoon, but did not attempt to obtain the ice. He eventually opened his mouth a little more after being given verbal cues, but had poor manipulation of the ice and never definitely initiated a swallow. He had similar results when presented with a small amount of honey thick liquid via spoon. He had a very delayed swallow with weak laryngeal elevation and a wet vocal quality. Pt was not appropriate for any further PO trials. Recommend NPO status until family makes decisions about how aggressive they want to be re: nutrition.            Time Calculation:         Time Calculation- SLP     Row Name 09/04/18 1533 09/04/18 1459 09/04/18 1246       Time Calculation- SLP    SLP Start Time 1504  -MB 1435  -TM 0838  -TM    SLP Stop Time 1533  -MB 1445  -TM 0854  -TM    SLP Time Calculation (min) 29 min  -MB 10 min  -TM 16 min  -TM    SLP Received On 09/04/18  -MB 09/04/18  -TM 09/04/18  -TM      User Key  (r) = Recorded By, (t) = Taken By, (c) = Cosigned By    Initials Name Provider Type    Eugenio Amador CCC-SLP Speech and Language Pathologist    Chely Mcguire CCC-SLP Speech and Language Pathologist          Therapy Charges for Today     Code Description Service Date Service Provider Modifiers Qty    [id number]  ST TREATMENT SWALLOW 2 9/4/2018 Eugenio Piedra CCC-SLP GN, KX 1          SLP G-Codes  SLP NOMS Used?: Yes  Functional Limitations: Swallowing  Swallow Current Status (): At least 80 percent but less than 100 percent impaired, limited or restricted  Swallow Goal Status (): At least 20 percent but less than 40 percent impaired, limited or restricted      JENARO Mcfarlane  9/4/2018

## 2018-09-04 NOTE — PLAN OF CARE
Problem: Patient Care Overview  Goal: Plan of Care Review  Outcome: Ongoing (interventions implemented as appropriate)   09/04/18 0838   Coping/Psychosocial   Plan of Care Reviewed With sibling;other (see comments)  (RN)   Plan of Care Review   Progress no change   OTHER   Outcome Summary Swallowing tx completed this AM. Upon ST arrival, pt naked, exposed in bed. No family initially present, yet sister did arrive shortly thereafter, stating pt has often been active during the night, with minimal sleep. Unable to awaken to mod verbal cues and sternal rub. Cold wash cloth applied to face and neck, with essentially no increased level of alertness. Pt was presented labial ice chip stimulation with spontaneous labial pucker noted around bolus in response to stim. Pt was also noted to complete a delayed swallow, felt to be in response to stim. RN arrived to present PO meds, yet ST and RN agreed that pt is too lethargic for PO intake at this time. Sister stated pt often is more alert for tx in the afternoon. Will attempt to re-visit this PM. Continue current diet of pureed with nectar thick liquids at this time, yet PO should only be provided IF PT IS ALERT. ST to monitor. Thanks!

## 2018-09-04 NOTE — PROGRESS NOTES
Nephrology ([facility]) Progress Note      Patient:  Ryan Alvarez  YOB: 1950  Date of Service: 9/4/2018  MRN: [id number]   Acct: [id number]   Primary Care Physician: Rufino Stevens APRN  Advance Directive:   Code Status and Medical Interventions:   Ordered at: 08/26/18 0454     Level Of Support Discussed With:    Patient     Code Status:    CPR     Medical Interventions (Level of Support Prior to Arrest):    Full     Admit Date: 8/25/2018       Hospital Day: 9  Referring Provider: Tayo Almonte MD      Patient personally seen and examined.  Complete chart including Consults, Notes, Operative Reports, Labs, Cardiology, and Radiology studies reviewed as able.        Subjective:  Ryan Alvarez is a 68 y.o. male  whom we were consulted for acute kidney injury.  No prior nephrological contacts; unknown renal baseline.  History of ETOH abuse, cirrhosis.  Presented to [facility] after a fall at home with an unknown down time.  He remembers falling outside and crawling back into house; was found some time later by family member.  Diagnosed with ТАТЬЯНА and subdural hematoma.  Transferred to [facility] for higher level of care.  Required pressors briefly after arrival for blood pressure support; these are now discontinued. Received 2 units PRBC on 8/27.  Transferred to medical floor 8/28.  Had right thoracentesis on 8/30 with 1500 ml fluid removed.  Renal function has been declining last few days; minimal oral intake.    Today he is lethargic, oriented to self and place.  No new overnight issues.  Urine output is now oliguric.    Allergies:  Patient has no known allergies.    Home Meds:  Prescriptions Prior to Admission   Medication Sig Dispense Refill Last Dose   • furosemide (LASIX) 40 MG tablet Take 40 mg by mouth Daily.      • hydrOXYzine (ATARAX) 10 MG tablet Take 10 mg by mouth 4 (Four) Times a Day.      • omeprazole (priLOSEC) 20 MG capsule Take 20 mg  by mouth Daily.      • propranolol (INDERAL) 10 MG tablet Take 10 mg by mouth 2 (Two) Times a Day.      • spironolactone (ALDACTONE) 100 MG tablet Take 100 mg by mouth 2 (Two) Times a Day.          Medicines:  Current Facility-Administered Medications   Medication Dose Route Frequency Provider Last Rate Last Dose   • acetaminophen (TYLENOL) tablet 650 mg  650 mg Oral Q4H PRN Tayo Almonte MD   650 mg at 09/04/18 0538    Or   • acetaminophen (TYLENOL) suppository 650 mg  650 mg Rectal Q4H PRN Tayo Almonte MD   650 mg at 09/02/18 2242   • acetaminophen (TYLENOL) tablet 650 mg  650 mg Oral Q6H PRN Tayo Almonte MD   650 mg at 08/28/18 2128   • flintstones complete (FLINTSTONES) chewable tablet 1 tablet  1 tablet Oral Daily Bennett Manzo DO   1 tablet at 09/01/18 0908   • guaiFENesin (MUCINEX) 12 hr tablet 1,200 mg  1,200 mg Oral Q12H Bennett Manzo DO   1,200 mg at 09/03/18 2058   • Hold medication  1 each Does not apply Continuous PRN Bennett Manzo DO       • hydrOXYzine (ATARAX) tablet 10 mg  10 mg Oral Q6H PRN Bennett Manzo DO       • ipratropium-albuterol (DUO-NEB) nebulizer solution 3 mL  3 mL Nebulization Q4H PRN Bennett Manzo DO   3 mL at 08/31/18 1942   • lactulose solution 30 g  30 g Oral TID Bennett Manzo DO   30 g at 09/03/18 1600   • LORazepam (ATIVAN) injection 0.5 mg  0.5 mg Intravenous Once Alireza Salinas MD       • ondansetron (ZOFRAN) tablet 4 mg  4 mg Oral Q6H PRN Tayo Almonte MD       • pantoprazole (PROTONIX) EC tablet 40 mg  40 mg Oral Q AM Bennett Manzo DO   40 mg at 09/01/18 1148   • propranolol (INDERAL) tablet 10 mg  10 mg Oral Q12H Bennett Manzo DO   10 mg at 09/03/18 2059   • QUEtiapine (SEROquel) tablet 25 mg  25 mg Oral Nightly Den Elena, DO       • sodium bicarbonate tablet 650 mg  650 mg Oral BID Sina Casas, NITISH   650 mg at 09/03/18 2059   • sodium chloride 0.9 % flush 1-10 mL  1-10 mL Intravenous PRN Tayo Almonte  "MD HAO       • sodium chloride 0.9 % infusion  75 mL/hr Intravenous Continuous Vlad Chaudhry MD 75 mL/hr at 09/02/18 1853 75 mL/hr at 09/02/18 1853   • tamsulosin (FLOMAX) 24 hr capsule 0.4 mg  0.4 mg Oral Daily Bennett Manzo DO           Past Medical History:  Past Medical History:   Diagnosis Date   • Alcohol abuse    • Ascites    • Cirrhosis of liver (CMS/HCC)    • Hernia of abdominal wall        Past Surgical History:  Past Surgical History:   Procedure Laterality Date   • BACK SURGERY         Family History  History reviewed. No pertinent family history.    Social History  Social History     Social History   • Marital status:      Spouse name: N/A   • Number of children: N/A   • Years of education: N/A     Occupational History   • Not on file.     Social History Main Topics   • Smoking status: Never Smoker   • Smokeless tobacco: Never Used   • Alcohol use Yes      Comment: at least 6 pack/daily   • Drug use: No   • Sexual activity: Not on file     Other Topics Concern   • Not on file     Social History Narrative   • No narrative on file         Review of Systems:  History obtained from chart review and the patient  General ROS: Patient complains of fatigue and No fever or chills  Respiratory ROS: No cough, shortness of breath.  Cardiovascular ROS: No chest pain or palpitations  Gastrointestinal ROS: No abdominal pain or melena  Genito-Urinary ROS: No dysuria or hematuria  Neurological ROS: no headache or dizziness    Objective:  BP 93/61 (BP Location: Right arm, Patient Position: Lying)   Pulse 58   Temp 98.3 °F (36.8 °C) (Axillary)   Resp 20   Ht 188 cm (74\")   Wt 94.9 kg (209 lb 4.8 oz)   SpO2 94%   BMI 26.87 kg/m²     Intake/Output Summary (Last 24 hours) at 09/04/18 1109  Last data filed at 09/04/18 0426   Gross per 24 hour   Intake               40 ml   Output              150 ml   Net             -110 ml     General: lethargic/oriented x2   Neck: supple, no JVD  Chest: "  diminished right  CVS: regular rate and rhythm  Abdominal: soft, nontender, normal bowel sounds  Extremities: no cyanosis or edema  Skin: warm and dry without rash  Neuro: no focal motor deficits    Labs:    Results from last 7 days  Lab Units 09/01/18  0504 08/31/18  0642 08/30/18  0657 08/29/18  0623   WBC 10*3/mm3 5.30  --  5.05 4.99   HEMOGLOBIN g/dL 9.1* 8.9* 8.7* 8.9*   HEMATOCRIT % 28.6* 27.5* 27.2* 27.5*   PLATELETS 10*3/mm3 151  --  144 168           Results from last 7 days  Lab Units 09/03/18  0545 09/02/18  0512 09/01/18  0504 08/31/18  0642 08/30/18  0657   SODIUM mmol/L 147* 145 143 142 143   POTASSIUM mmol/L 4.9 4.8 4.6 4.8 4.1   CHLORIDE mmol/L 115* 114* 113* 113* 112*   CO2 mmol/L 20.0* 21.0* 22.0* 24.0 23.0*   BUN mg/dL 36* 33* 31* 29* 28*   CREATININE mg/dL 1.83* 1.72* 1.55* 1.32 1.49*   CALCIUM mg/dL 8.9 8.9 8.7 8.5 8.1*   BILIRUBIN mg/dL  --   --  0.7 1.0 0.7   ALK PHOS U/L  --   --  77 74 87   ALT (SGPT) U/L  --   --  47 37 37   AST (SGOT) U/L  --   --  42 51* 41   GLUCOSE mg/dL 67* 85 71 72 117*       Radiology:   Imaging Results (last 72 hours)     Procedure Component Value Units Date/Time    XR Spine Lumbar AP & Lateral [[id number]] Collected:  08/27/18 1837     Updated:  08/27/18 1841    Narrative:       EXAMINATION:   XR SPINE LUMBAR AP AND LATERAL-  8/27/2018 6:37 PM CDT     HISTORY: Patient fell     3 views the lumbar spine are obtained. Lumbar vertebra are relatively  aligned. Loss height of the L4-5 disc.     Prominent hypertrophic in      syndesmophyte formations present at L1-2 and L2-3 levels. Hypertrophic  degenerative changes present the L4-5 level. Anterior hypertrophic  osteophytes are present the L1-2, L2-3 and L3-4 levels.     SI joints are normal.     IMPRESSION hypertrophic degenerative changes noted throughout the lumbar  spine.  This report was finalized on 08/27/2018 18:38 by Dr. Tacho Frias MD.    XR Spine Thoracic 2 View [[id number]] Collected:  08/27/18 1837      Updated:  08/27/18 1839    Narrative:       XR SPINE THORACIC 2 VW- 8/27/2018 6:04 PM CDT     HISTORY: fall; I62.00-Nontraumatic subdural hemorrhage, unspecified;  N17.9-Acute kidney failure, unspecified; E86.0-Dehydration;  D64.9-Anemia, unspecified; M62.82-Rhabdomyolysis; F10.10-Alcohol abuse,  uncomplicated; W19.XXXA-Unspecified fall, initial encounter;  T07.XXXA-Unspecified multiple injuries, initial encounter;  R74.8-Abnormal levels of other serum enzymes; Z74.09-Other reduced  mobility; R53.1-Weak     COMPARISON: None     FINDINGS:  Frontal, lateral and swimmers lateral radiographs of the thoracic spine  demonstrate normal alignment without evidence of compression fracture or  subluxation. The pedicles are intact. Prominent anterior hypertrophic  degenerative changes noted in the mid and lower thoracic spine.  Ossification anterior longitudinal ligament is noted.     The visualized thorax is clear.        Impression:       1. Hypertrophic degenerative spondylosis is noted throughout the  thoracic spine. No acute compression deformity is identified..     This report was finalized on 08/27/2018 18:36 by Dr. Tacho Frias MD.    CT Cervical Spine Without Contrast [[id number]] Collected:  08/27/18 1833     Updated:  08/27/18 1838    Narrative:       EXAMINATION:   CT CERVICAL SPINE WO CONTRAST-  8/27/2018 6:33 PM CDT     HISTORY: CT CERVICAL SPINE without contrast dated 8/27/2018 6:13 PM CDT     HISTORY: fall; I62.00-Nontraumatic subdural hemorrhage, unspecified;  N17.9-Acute kidney failure, unspecified; E86.0-Dehydration;  D64.9-Anemia, unspecified; M62.82-Rhabdomyolysis; F10.10-Alcohol abuse,  uncomplicated; W19.XXXA-Unspecified fall, initial encounter;  T07.XXXA-Unspecified multiple injuries, initial encounter;  R74.8-Abnormal levels of other serum enzymes; Z74.09-Other reduced  mobility; R53.1-Weak     COMPARISON: None      DOSE LENGTH PRODUCT: 302 mGy cm     TECHNIQUE: Serial helical tomographic images of the  cervical spine were  obtained without the use of intravenous contrast. Additionally, sagittal  and coronal reformatted images were also provided for review.      FINDINGS:   Multilevel degenerative changes are seen in the cervical spine. Loss of  height of the C4-5 C5-6 and C6-7 disc space levels. Uncinate spurring is  present bilaterally to C5-6 and C6-7 levels.     The odontoid process is intact. There is hypertrophic degenerative  change in the posterior facets the C2-3 level     Ossification of the nuchal ligament is noted.      There is no evidence of acute fracture or subluxation. The prevertebral  soft tissues are within normal limits. The posterior elements are  intact. Vertebral body heights are maintained.     The visualized skull base is intact. The visualized thorax demonstrates  no acute abnormality.       Impression:       1. Degenerative changes in the cervical spine without evidence of acute  osseous injury.     This report was finalized on 08/27/2018 18:35 by Dr. Tacho Frias MD.    US Renal Bilateral [[id number]] Collected:  08/27/18 1554     Updated:  08/27/18 1559    Narrative:       EXAM:  US RENAL BILATERAL-     INDICATION:  Acute renal failure     COMPARISON:  None     TECHNIQUE:  Routine grayscale and color Doppler images were obtained  through the bilateral renal fossae.     FINDINGS:       Right Kidney: The right kidney measures 10.9 cm in longitudinal  dimension. There is good corticomedullary differentiation. There is no  evidence of hydronephrosis. There are no cystic lesions or masses. No  echogenic stone.     Left Kidney: The left kidney measures 10.1 cm in longitudinal dimension.  There is good corticomedullary differentiation. There is no evidence of  hydronephrosis. There are no cystic lesions or masses. No echogenic  stone.     Bladder: The bladder is unremarkable without evidence of mass or  internal debris.  Ascites    Impression:       1.  Unremarkable appearance of the  kidneys.  2.  Ascites.     This report was finalized on 08/27/2018 15:55 by Dr. Stefani Dunaway MD.    CT Head Without Contrast [[id number]] Collected:  08/27/18 0723     Updated:  08/27/18 0736    Narrative:       CT HEAD WO CONTRAST- 8/27/2018 4:44 AM CDT     HISTORY: sdh; I62.00-Nontraumatic subdural hemorrhage, unspecified;  N17.9-Acute kidney failure, unspecified; E86.0-Dehydration;  D64.9-Anemia, unspecified; M62.82-Rhabdomyolysis; F10.10-Alcohol abuse,  uncomplicated; W19.XXXA-Unspecified fall, initial encounter;  T07.XXXA-Unspecified multiple injuries, initial encounter;  R74.8-Abnormal levels of other serum enzymes       DOSE LENGTH PRODUCT: 1236 mGy cm. Automated exposure control was also  utilized to decrease patient radiation dose.     Technique:   Axial CT of the brain without IV contrast. Sagittal and coronal  reformations are also provided for review. Soft tissue and bone kernels  are available for interpretation.     Comparison: CT scan dated 8/26/2018.     Findings:      Stable mixed-density right cerebral convexity subdural hematoma  measuring up to 13 mm in thickness (series 5-image 24). A 2.6 cm  hyperdensity is noted in the paramedian posterior right frontal lobe  (series 7-image 27). There are speckled areas of dense calcification  more inferiorly along this hyperdensity. There does not appear to be  obvious volume loss in this area to suggest an old injury. No mass  effect or adjacent vasogenic edema.     There is no evidence of acute large vascular distribution infarct.  The  ventricles, cortical sulci and basal cisterns are symmetric and age  appropriate.  Normal brainstem and posterior fossa.  The scalp and  calvarium are unremarkable. Visualized paranasal sinuses and mastoids  are clear.        Impression:       Impression:    1. Stable mixed-density (acute on chronic) right convexity subdural  hematoma. No significant mass effect.  2.  Stable 2.6 cm intraparenchymal hyperdensity in the  paramedian  posterior right frontal lobe. This does not appear acute, unlikely acute  intracranial hemorrhage. Appearance would be unusual for neoplasm.  Consider MRI.  This report was finalized on 08/27/2018 07:33 by Dr Jaquan Roth, .    XR Shoulder 2+ View Left [[id number]] Collected:  08/26/18 1150     Updated:  08/26/18 1156    Narrative:       EXAM: XR SHOULDER 2+ VW LEFT- - 8/26/2018 9:57 AM CDT     HISTORY: pain after fall; I62.00-Nontraumatic subdural hemorrhage,  unspecified; N17.9-Acute kidney failure, unspecified; E86.0-Dehydration;  D64.9-Anemia, unspecified; M62.82-Rhabdomyolysis; F10.10-Alcohol abuse,  uncomplicated; W19.XXXA-Unspecified fall, initial encounter;  T07.XXXA-Unspecified multiple injuries, initial encounter;  R74.8-Abnormal levels of other serum enzymes       COMPARISON: None.      TECHNIQUE:  2 images.  Portable AP internal rotation and AP external  rotation views of the left shoulder.     FINDINGS:    Limited assessment of alignment on provided views. No displaced  fracture. No aggressive bony lesion. Degenerative changes at the  acromioclavicular joint. Apparent superior migration of the humeral head  may indicate chronic rotator cuff injury.          Impression:       1. Limited assessment of alignment on portable views.  2. No acute bony finding.  3. Apparent superior migration of the humeral head may indicate chronic  rotator cuff injury or this finding could be positional.  This report was finalized on 08/26/2018 11:53 by Dr Tabatha Pacheco MD.    XR Pelvis 1 or 2 View [[id number]] Collected:  08/26/18 1143     Updated:  08/26/18 1147    Narrative:       EXAM: XR PELVIS 1 OR 2 VW- - 8/25/2018 11:31 PM CDT     HISTORY: fall       COMPARISON: None.      TECHNIQUE:  1 images.  Frontal view of the pelvis     FINDINGS:    No displaced fracture or aggressive bony lesion. Degenerative changes at  the bilateral hips. Sacroiliac joints and pubic symphysis anatomic.  Sacral arcuate lines  appear intact. Degenerative changes at the  lumbosacral spine. Pelvic phleboliths. Vascular calcifications. Surgical  clips overlie crying soft tissues, possibly vasectomy.          Impression:       1. No acute bony finding.  2. Vascular calcifications.  This report was finalized on 08/26/2018 11:44 by Dr Tabatha Pacheco MD.    CT Head Without Contrast [[id number]] Collected:  08/26/18 1047     Updated:  08/26/18 1053    Narrative:       EXAM: CT HEAD WO CONTRAST- - 8/26/2018 8:13 AM CDT     HISTORY: Subdural hematoma; I62.00-Nontraumatic subdural hemorrhage,  unspecified; N17.9-Acute kidney failure, unspecified; E86.0-Dehydration;  D64.9-Anemia, unspecified; M62.82-Rhabdomyolysis; F10.10-Alcohol abuse,  uncomplicated; W19.XXXA-Unspecified fall, initial encounter;  T07.XXXA-Unspecified multiple injuries, initial encounter;  R74.8-Abnormal levels of other serum enzymes       COMPARISON: 8/25/2018.      DOSE LENGTH PRODUCT: 715 mGy cm. Automated exposure control was also  utilized to decrease patient radiation dose.     TECHNIQUE: Unenhanced axial CT images obtained from vertex to skull  base.     FINDINGS:  Redemonstration of right frontoparietal extra-axial hematoma measuring  14 mm in thickness. No significant change. Similar mild mass effect on  the adjacent sulci. No midline shift.     Similar hyperdensity at the right frontal lobe with small associated  calcifications.     Ventricles, remaining sulci and basilar cisterns within normal limits.  Midline structures anatomic.     Globes, retrobulbar soft tissues, paranasal sinuses, mastoid air cells  and external auditory canals are within normal limits. No depressed  skull fracture. Similar mild swelling of the right frontoparietal scalp.       Impression:       1. Similar size of right frontoparietal extra-axial hematoma. No midline  shift.  2. Similar hyperdensity of the right frontal lobe.  This report was finalized on 08/26/2018 10:50 by Dr Tabatha Pacheco MD.     CT Head Without Contrast [[id number]] Collected:  08/26/18 1031     Updated:  08/26/18 1050    Narrative:       EXAM: CT HEAD WO CONTRAST- - 8/25/2018 11:18 PM CDT     HISTORY: etoh fall       COMPARISON: Subsequent exam 8/26/2018.      DOSE LENGTH PRODUCT: 783 mGy cm. Automated exposure control was also  utilized to decrease patient radiation dose.     TECHNIQUE: Unenhanced axial CT images obtained from vertex to skull  base. Preliminary interpretation performed by stat vero 8/25/2018 at 2348  hours.     FINDINGS:  There is hyperdense extra-axial fluid collection at the right  frontoparietal convexity, measuring 14 mm in thickness and up to 7.8 cm  in AP dimension on axial image 20. Mild mass effect on the adjacent  sulci. No midline shift.     Hyperdensity with possible small calcification at the superior right  frontal lobe cortex and subcortical white matter.      Ventricles, sulci and basilar cisterns within normal limits. Midline  structures anatomic.     Globes, retrobulbar soft tissues, paranasal sinuses, mastoid air cells  and external auditory canals are within normal limits. Calvarium appears  intact. Thickening of the right frontoparietal subcutaneous tissues,  likely hematoma.       Impression:       1. Right frontoparietal extra-axial hematoma measuring 14 mm in  thickness. Mild mass effect on the adjacent sulci. No midline shift.  2. Hyperdensity at the high right frontal lobe of uncertain chronicity,  could represent parenchymal hemorrhage, sequelae of prior infection or  vascular anomaly.  This report was finalized on 08/26/2018 10:47 by Dr Tabatha Pacheco MD.          Culture:         Assessment   1.  Acute kidney injury--prerenal due to volume depletion--worsening  2.  Rhabdomyolysis--resolved  3.  Fall with subdural hematoma  4.  Alcoholic cirrhosis  5.  Anemia  6.  Metabolic acidosis  7.  Hypernatremia   8.  Clinically volume depleted    Plan:  1.  AM labs still pending  2.  Begin half-normal  saline at 100 ml/hour  3.  Monitor labs       Sina Casas, NITISH  9/4/2018  11:09 AM

## 2018-09-05 VITALS
SYSTOLIC BLOOD PRESSURE: 85 MMHG | DIASTOLIC BLOOD PRESSURE: 69 MMHG | HEIGHT: 74 IN | RESPIRATION RATE: 18 BRPM | OXYGEN SATURATION: 100 % | HEART RATE: 51 BPM | WEIGHT: 209.3 LBS | TEMPERATURE: 97 F | BODY MASS INDEX: 26.86 KG/M2

## 2018-09-05 LAB
ANION GAP SERPL CALCULATED.3IONS-SCNC: 11 MMOL/L (ref 4–13)
BUN BLD-MCNC: 40 MG/DL (ref 5–21)
BUN/CREAT SERPL: 15.7 (ref 7–25)
CALCIUM SPEC-SCNC: 8.8 MG/DL (ref 8.4–10.4)
CHLORIDE SERPL-SCNC: 116 MMOL/L (ref 98–110)
CO2 SERPL-SCNC: 20 MMOL/L (ref 24–31)
CREAT BLD-MCNC: 2.55 MG/DL (ref 0.5–1.4)
GFR SERPL CREATININE-BSD FRML MDRD: 25 ML/MIN/1.73
GLUCOSE BLD-MCNC: 68 MG/DL (ref 70–100)
INR PPP: 1.33 (ref 0.91–1.09)
POTASSIUM BLD-SCNC: 5.1 MMOL/L (ref 3.5–5.3)
PROTHROMBIN TIME: 16.9 SECONDS (ref 11.9–14.6)
SODIUM BLD-SCNC: 147 MMOL/L (ref 135–145)

## 2018-09-05 PROCEDURE — 85610 PROTHROMBIN TIME: CPT | Performed by: NEUROLOGICAL SURGERY

## 2018-09-05 PROCEDURE — 99223 1ST HOSP IP/OBS HIGH 75: CPT | Performed by: CLINICAL NURSE SPECIALIST

## 2018-09-05 PROCEDURE — 99497 ADVNCD CARE PLAN 30 MIN: CPT | Performed by: CLINICAL NURSE SPECIALIST

## 2018-09-05 PROCEDURE — 99498 ADVNCD CARE PLAN ADDL 30 MIN: CPT | Performed by: CLINICAL NURSE SPECIALIST

## 2018-09-05 PROCEDURE — 99231 SBSQ HOSP IP/OBS SF/LOW 25: CPT | Performed by: NEUROLOGICAL SURGERY

## 2018-09-05 PROCEDURE — 80048 BASIC METABOLIC PNL TOTAL CA: CPT | Performed by: INTERNAL MEDICINE

## 2018-09-05 PROCEDURE — 94799 UNLISTED PULMONARY SVC/PX: CPT

## 2018-09-05 RX ORDER — QUETIAPINE FUMARATE 25 MG/1
25 TABLET, FILM COATED ORAL NIGHTLY
Start: 2018-09-05

## 2018-09-05 RX ORDER — TAMSULOSIN HYDROCHLORIDE 0.4 MG/1
0.4 CAPSULE ORAL DAILY
Qty: 30 CAPSULE
Start: 2018-09-06

## 2018-09-05 RX ADMIN — SODIUM CHLORIDE 500 ML: 9 INJECTION, SOLUTION INTRAVENOUS at 10:13

## 2018-09-05 RX ADMIN — SODIUM CHLORIDE 500 ML: 9 INJECTION, SOLUTION INTRAVENOUS at 02:41

## 2018-09-05 RX ADMIN — SODIUM CHLORIDE 100 ML/HR: 4.5 INJECTION, SOLUTION INTRAVENOUS at 03:45

## 2018-09-05 RX ADMIN — ACETAMINOPHEN 650 MG: 650 SUPPOSITORY RECTAL at 13:06

## 2018-09-05 NOTE — DISCHARGE SUMMARY
[facility] Medicine Services  DISCHARGE SUMMARY       Date of Admission: 8/25/2018  Date of Discharge:  9/5/2018  Primary Care Physician: Rufino Stevens APRN    Discharge Diagnoses:  Patient Active Problem List   Diagnosis   • Subdural hematoma (CMS/HCC)   • Alcohol abuse   • ТАТЬЯНА (acute kidney injury) (CMS/HCC)   • Umbilical hernia   • Abrasions of multiple sites   • Cirrhosis of liver (CMS/HCC)   • Non-traumatic rhabdomyolysis   • Dehydration   • Elevated troponin   • Anemia         Presenting Problem/History of Present Illness:  Subdural hematoma (CMS/HCC) [I62.00]  Subdural hematoma (CMS/HCC) [I62.00]     Chief Complaint on Day of Discharge:   None    History of Present Illness on Day of Discharge:   The patient's mental status continues to decline.  Renal function is worsening with creatinine elevated to 2.55 today despite IV fluids.  The patient's family has opted not to insert a PEG tube for nutrition and hydration.  They have decided to transition the patient to palliative care with no further interventions requested are planned.  The patient has been accepted to a nursing facility and will be transferred there today.    Hospital Course  Patient is a 68 y.o. male presented to the emergency department after a fall outside at home and having been down for at least 2 days.  CT scan of the head performed at admission showed evidence of a subdural hematoma and laboratory findings were consistent with acute renal failure secondary to dehydration as well as previously noted chronic liver disease.  The patient's troponin was slightly elevated and was felt to be insignificant.  Neurosurgery was consulted and ordered serial CT scans of the head.  The patient was admitted to the intensive care unit and the patient was placed on an alcohol withdrawal protocol.  A 2-D echocardiogram was obtained.  Neurosurgery elected to treat patient conservatively.  The patient's subdural hematoma  was stable throughout his hospital stay.  The patient was noted to be anemic after rehydration and was given 2 units of packed red blood cells.  Nephrology was consulted regarding acute kidney injury and recommended conservative treatment with IV fluid rehydration.  Renal ultrasound was obtained and was unremarkable.  Chest x-ray was performed during his stay showing complete opacification of the right hemithorax.  It was felt to be secondary to liver disease and consistent with hepatic hydrothorax.  The patient complained of increased abdominal girth and had requested a repeat paracentesis which was not possible given his renal failure.  The patient typically gets frequent paracenteses in [place].  Thoracentesis was performed yielding 1.5 L.  Shortly after that procedure that the patient's mental status began to decline.  He began to redevelop a metabolic encephalopathy likely secondary to liver disease.  MRI scan of the brain was obtained and showed that the subdural hematoma was slightly larger and neurosurgery elected to continue to proceed conservatively.  Because of his metabolic encephalopathy, the patient was empirically treated with a lactulose enema and started on oral lactulose thereafter.  There was significant improvement in his mental status after the treatment was initiated.  The patient continued to express worsening encephalopathy and the patient's renal function had improved to a creatinine of 1.3.  The patient's renal function began to decline and in the next several days the patient's mental status decreased and it was felt the patient was experiencing hepatorenal syndrome.  The patient became unable to eat or swallow fluids and was arousable only to painful stimuli.  He would occasionally awaken and have a somewhat lucid conversation with his daughter but then would return to his encephalopathic state.  It was at this point that a long discussion was held with the patient's son and daughter and  they elected to transition the patient to palliative care.  There agreed with transferring the patient to a skilled nursing facility for end-of-life care.  The patient's CODE STATUS was changed and he was subsequently felt appropriate for transfer.    Procedures Performed:   Thoracentesis    Consults:   Neurosurgery  Nephrology    Pertinent Test Results:   Lab Results (all)     Procedure Component Value Units Date/Time    Protime-INR [[id number]]  (Abnormal) Collected:  09/05/18 0803    Specimen:  Blood Updated:  09/05/18 0819     Protime 16.9 (H) Seconds      INR 1.33 (H)    Basic Metabolic Panel [[id number]]  (Abnormal) Collected:  09/05/18 0414    Specimen:  Blood Updated:  09/05/18 0514     Glucose 68 (L) mg/dL      BUN 40 (H) mg/dL      Creatinine 2.55 (H) mg/dL      Sodium 147 (H) mmol/L      Potassium 5.1 mmol/L      Chloride 116 (H) mmol/L      CO2 20.0 (L) mmol/L      Calcium 8.8 mg/dL      eGFR Non African Amer 25 (L) mL/min/1.73      BUN/Creatinine Ratio 15.7     Anion Gap 11.0 mmol/L     Narrative:       GFR Normal >60  Chronic Kidney Disease <60  Kidney Failure <15    AFB Culture - Body Fluid, Pleural Cavity [[id number]]  (Normal) Collected:  08/30/18 1342    Specimen:  Body Fluid from Pleural Cavity Updated:  09/04/18 1416     AFB Culture No AFB isolated at less than 1 week     AFB Stain No acid fast bacilli seen on direct smear      No acid fast bacilli seen on concentrated smear    Fungus Culture - Body Fluid, Pleural Cavity [[id number]] Collected:  08/30/18 1342    Specimen:  Body Fluid from Pleural Cavity Updated:  09/04/18 1416     Fungus Culture No fungus isolated at less than 1 week    Comprehensive Metabolic Panel [[id number]]  (Abnormal) Collected:  09/04/18 1103    Specimen:  Blood Updated:  09/04/18 1122     Glucose 77 mg/dL      BUN 39 (H) mg/dL      Creatinine 2.34 (H) mg/dL      Sodium 147 (H) mmol/L      Potassium 5.2 mmol/L      Chloride 115 (H) mmol/L      CO2 22.0 (L) mmol/L      Calcium  9.1 mg/dL      Total Protein 6.3 g/dL      Albumin 2.80 (L) g/dL      ALT (SGPT) 39 U/L      AST (SGOT) 50 (H) U/L      Alkaline Phosphatase 93 U/L      Total Bilirubin 0.8 mg/dL      eGFR Non African Amer 28 (L) mL/min/1.73      Globulin 3.5 gm/dL      A/G Ratio 0.8 (L) g/dL      BUN/Creatinine Ratio 16.7     Anion Gap 10.0 mmol/L     Anaerobic Culture - Pleural Fluid, Pleural Cavity [[id number]]  (Normal) Collected:  18 1342    Specimen:  Pleural Fluid from Pleural Cavity Updated:  18 1024     Culture No anaerobes isolated at 5 days    Body Fluid Culture - Body Fluid, Pleural Cavity [[id number]]  (Normal) Collected:  18 1342    Specimen:  Body Fluid from Pleural Cavity Updated:  18 0637     BF Culture No growth at 5 days     Gram Stain Result Moderate (3+) WBCs seen      No organisms seen    Non-gynecologic Cytology [[id number]] Collected:  18 1342    Specimen:  Body Fluid from Pleural Cavity Updated:  18 1710     Case Report --     Non-gynecologic Cytology                          Case: [id number]                                  Authorizing Provider:  Bennett Manzo DO        Collected:           2018 01:42 PM          Ordering Location:     [address]  Received:            2018 02:44 PM          Pathologist:           Tosin Arora MD                                                         Specimen:    Pleural Cavity                                                                              Final Diagnosis --     Pleural fluid, thoracentesis:  Negative for malignancy.  Benign reactive mesothelial cells and macrophages.  Mixed inflammation.       Gross Description --     Received fresh in the laboratory in a container labeled with patient name and  designated as pleural fluid.  1400 mls of yellow fluid.  1 thin prep slide and 1 cell block made.         Microscopic Description --     ThinPrep and cell block reveal benign reactive mesothelial  cells intermixed macrophages and mixed inflammation.  Negative for malignancy.      Urine Culture - Urine, [[id number]]  (Normal) Collected:  09/01/18 1834    Specimen:  Urine from Urine, Catheter Updated:  09/03/18 0912     Urine Culture No growth at 2 days    Urinalysis With Culture If Indicated - Urine, Catheter [[id number]]  (Abnormal) Collected:  09/01/18 1834    Specimen:  Urine from Urine, Catheter Updated:  09/01/18 1916     Color, UA Dark Yellow (A)     Appearance, UA Cloudy (A)     pH, UA <=5.0     Specific Gravity, UA 1.024     Glucose, UA Negative     Ketones, UA Trace (A)     Bilirubin, UA Small (1+) (A)     Blood, UA Large (3+) (A)     Protein, UA 30 mg/dL (1+) (A)     Leuk Esterase, UA Moderate (2+) (A)     Nitrite, UA Negative     Urobilinogen, UA 0.2 E.U./dL    Urinalysis, Microscopic Only - Urine, Clean Catch [[id number]]  (Abnormal) Collected:  09/01/18 1834    Specimen:  Urine from Urine, Catheter Updated:  09/01/18 1916     RBC, UA 3-5 (A) /HPF      WBC, UA 6-12 (A) /HPF      Bacteria, UA 1+ (A) /HPF      Squamous Epithelial Cells, UA 3-6 (A) /HPF      Yeast, UA Small/1+ Budding Yeast /HPF      Hyaline Casts, UA 13-20 /LPF      Granular Casts, UA 7-12 /LPF      Methodology Manual Light Microscopy    Protein, Body Fluid - Pleural Fluid, Pleural Cavity [[id number]] Collected:  08/30/18 1341    Specimen:  Pleural Fluid from Pleural Cavity Updated:  09/01/18 0722     Protein, Fluid 1.6 g/dL     Ammonia [[id number]]  (Normal) Collected:  09/01/18 0504    Specimen:  Blood Updated:  09/01/18 0559     Ammonia 32 umol/L     Comprehensive Metabolic Panel [[id number]]  (Abnormal) Collected:  09/01/18 0504    Specimen:  Blood Updated:  09/01/18 0535     Glucose 71 mg/dL      BUN 31 (H) mg/dL      Creatinine 1.55 (H) mg/dL      Sodium 143 mmol/L      Potassium 4.6 mmol/L      Chloride 113 (H) mmol/L      CO2 22.0 (L) mmol/L      Calcium 8.7 mg/dL      Total Protein 5.5 (L) g/dL      Albumin 2.40 (L) g/dL      ALT  (SGPT) 47 U/L      AST (SGOT) 42 U/L      Alkaline Phosphatase 77 U/L      Total Bilirubin 0.7 mg/dL      eGFR Non African Amer 45 (L) mL/min/1.73      Globulin 3.1 gm/dL      A/G Ratio 0.8 (L) g/dL      BUN/Creatinine Ratio 20.0     Anion Gap 8.0 mmol/L     CBC (No Diff) [[id number]]  (Abnormal) Collected:  09/01/18 0504    Specimen:  Blood Updated:  09/01/18 0520     WBC 5.30 10*3/mm3      RBC 3.23 (L) 10*6/mm3      Hemoglobin 9.1 (L) g/dL      Hematocrit 28.6 (L) %      MCV 88.5 fL      MCH 28.2 pg      MCHC 31.8 (L) g/dL      RDW 17.8 (H) %      RDW-SD 54.5 (H) fl      MPV 9.3 fL      Platelets 151 10*3/mm3     Comprehensive Metabolic Panel [[id number]]  (Abnormal) Collected:  08/31/18 0642    Specimen:  Blood Updated:  08/31/18 0736     Glucose 72 mg/dL      BUN 29 (H) mg/dL      Comment: Specimen hemolyzed. Results may be affected.        Creatinine 1.32 mg/dL      Sodium 142 mmol/L      Potassium 4.8 mmol/L      Comment: Specimen hemolyzed.  Results may be affected.        Chloride 113 (H) mmol/L      CO2 24.0 mmol/L      Calcium 8.5 mg/dL      Total Protein 5.6 (L) g/dL      Albumin 2.30 (L) g/dL      ALT (SGPT) 37 U/L      Comment: Specimen hemolyzed.  Results may be affected.        AST (SGOT) 51 (H) U/L      Comment: Specimen hemolyzed.  Results may be affected.        Alkaline Phosphatase 74 U/L      Comment: Specimen hemolyzed. Results may be affected.        Total Bilirubin 1.0 mg/dL      eGFR Non African Amer 54 (L) mL/min/1.73      Globulin 3.3 gm/dL      A/G Ratio 0.7 (L) g/dL      BUN/Creatinine Ratio 22.0     Anion Gap 5.0 mmol/L     Hemoglobin & Hematocrit, Blood [[id number]]  (Abnormal) Collected:  08/31/18 0642    Specimen:  Blood Updated:  08/31/18 0723     Hemoglobin 8.9 (L) g/dL      Hematocrit 27.5 (L) %     Lactate Dehydrogenase, Body Fluid - Body Fluid, Pleural Cavity [[id number]] Collected:  08/30/18 1342    Specimen:  Body Fluid from Pleural Cavity Updated:  08/31/18 0722     LD, Fluid 107  IU/L     Albumin, Fluid - Pleural Fluid, Pleural Cavity [[id number]] Collected:  08/30/18 1341    Specimen:  Pleural Fluid from Pleural Cavity Updated:  08/31/18 0722     Albumin, Fluid 0.7 g/dL     Protime-INR [[id number]]  (Abnormal) Collected:  08/31/18 0642    Specimen:  Blood Updated:  08/31/18 0707     Protime 17.8 (H) Seconds      INR 1.42 (H)    Ammonia [[id number]]  (Normal) Collected:  08/30/18 1520    Specimen:  Blood Updated:  08/30/18 1615     Ammonia 16 umol/L     Protime-INR [[id number]]  (Abnormal) Collected:  08/30/18 0657    Specimen:  Blood Updated:  08/30/18 0800     Protime 18.4 (H) Seconds      INR 1.48 (H)    aPTT [[id number]]  (Abnormal) Collected:  08/30/18 0657    Specimen:  Blood Updated:  08/30/18 0800     PTT 43.8 (H) seconds     Comprehensive Metabolic Panel [[id number]]  (Abnormal) Collected:  08/30/18 0657    Specimen:  Blood Updated:  08/30/18 0719     Glucose 117 (H) mg/dL      BUN 28 (H) mg/dL      Creatinine 1.49 (H) mg/dL      Sodium 143 mmol/L      Potassium 4.1 mmol/L      Chloride 112 (H) mmol/L      CO2 23.0 (L) mmol/L      Calcium 8.1 (L) mg/dL      Total Protein 5.3 (L) g/dL      Albumin 2.20 (L) g/dL      ALT (SGPT) 37 U/L      AST (SGOT) 41 U/L      Alkaline Phosphatase 87 U/L      Total Bilirubin 0.7 mg/dL      eGFR Non African Amer 47 (L) mL/min/1.73      Globulin 3.1 gm/dL      A/G Ratio 0.7 (L) g/dL      BUN/Creatinine Ratio 18.8     Anion Gap 8.0 mmol/L     CBC (No Diff) [[id number]]  (Abnormal) Collected:  08/30/18 0657    Specimen:  Blood Updated:  08/30/18 0710     WBC 5.05 10*3/mm3      RBC 3.11 (L) 10*6/mm3      Hemoglobin 8.7 (L) g/dL      Hematocrit 27.2 (L) %      MCV 87.5 fL      MCH 28.0 pg      MCHC 32.0 (L) g/dL      RDW 17.2 (H) %      RDW-SD 52.8 fl      MPV 8.8 fL      Platelets 144 10*3/mm3     Hemoglobin & Hematocrit, Blood [[id number]]  (Abnormal) Collected:  08/27/18 1556    Specimen:  Blood Updated:  08/27/18 1630     Hemoglobin 8.8 (L) g/dL       Hematocrit 26.7 (L) %     Urine Drug Screen - Urine, Clean Catch [[id number]]  (Normal) Collected:  08/27/18 1150    Specimen:  Urine from Urine, Clean Catch Updated:  08/27/18 1222     Amphetamine Screen, Urine Negative     Barbiturates Screen, Urine Negative     Benzodiazepine Screen, Urine Negative     Cocaine Screen, Urine Negative     Methadone Screen, Urine Negative     Opiate Screen Negative     Phencyclidine (PCP), Urine Negative     THC, Screen, Urine Negative    Sodium, Urine, Random - Urine, Clean Catch [[id number]]  (Abnormal) Collected:  08/27/18 1150    Specimen:  Urine from Urine, Clean Catch Updated:  08/27/18 1213     Sodium, Urine 23 (L) mmol/L     Creatinine, Urine, Random - Urine, Clean Catch [[id number]] Collected:  08/27/18 1150    Specimen:  Urine from Urine, Clean Catch Updated:  08/27/18 1213     Creatinine, Urine 229.6 mg/dL     Protein, Urine, Random - Urine, Clean Catch [[id number]]  (Abnormal) Collected:  08/27/18 1150    Specimen:  Urine from Urine, Clean Catch Updated:  08/27/18 1213     Total Protein, Urine 28.0 (H) mg/dL     Urea Nitrogen, Urine - Urine, Clean Catch [[id number]] Collected:  08/27/18 1150    Specimen:  Urine from Urine, Clean Catch Updated:  08/27/18 1213     Urea Nitrogen, Urine 348 mg/dL     Urinalysis With Microscopic If Indicated (No Culture) - Urine, Clean Catch [[id number]]  (Abnormal) Collected:  08/27/18 1150    Specimen:  Urine from Urine, Clean Catch Updated:  08/27/18 1210     Color, UA Dark Yellow (A)     Appearance, UA Cloudy (A)     pH, UA <=5.0     Specific Gravity, UA 1.021     Glucose, UA Negative     Ketones, UA Trace (A)     Bilirubin, UA Small (1+) (A)     Blood, UA Large (3+) (A)     Protein, UA 30 mg/dL (1+) (A)     Leuk Esterase, UA Small (1+) (A)     Nitrite, UA Negative     Urobilinogen, UA 1.0 E.U./dL    Urinalysis, Microscopic Only - Urine, Clean Catch [[id number]]  (Abnormal) Collected:  08/27/18 1150    Specimen:  Urine from Urine, Clean Catch  Updated:  08/27/18 1210     RBC, UA 13-20 (A) /HPF      WBC, UA 21-30 (A) /HPF      Bacteria, UA None Seen /HPF      Squamous Epithelial Cells, UA None Seen /HPF      Hyaline Casts, UA 0-2 /LPF      Methodology Automated Microscopy    Folate [[id number]] Collected:  08/27/18 0340    Specimen:  Blood Updated:  08/27/18 0557     Folate 11.70 ng/mL     Vitamin B12 [[id number]]  (Normal) Collected:  08/27/18 0340    Specimen:  Blood Updated:  08/27/18 0557     Vitamin B-12 916 pg/mL     Ferritin [[id number]]  (Normal) Collected:  08/27/18 0340    Specimen:  Blood Updated:  08/27/18 0527     Ferritin 112.00 ng/mL     Iron Profile [[id number]]  (Abnormal) Collected:  08/27/18 0340    Specimen:  Blood Updated:  08/27/18 0500     Iron 14 (L) mcg/dL      TIBC 236 mcg/dL      Iron Saturation 6 (L) %     Troponin [[id number]]  (Abnormal) Collected:  08/27/18 0340    Specimen:  Blood Updated:  08/27/18 0415     Troponin I 0.123 (H) ng/mL     CBC Auto Differential [[id number]]  (Abnormal) Collected:  08/27/18 0340    Specimen:  Blood Updated:  08/27/18 0410     WBC 6.03 10*3/mm3      RBC 2.47 (L) 10*6/mm3      Hemoglobin 6.9 (C) g/dL      Hematocrit 20.9 (C) %      MCV 84.6 fL      MCH 27.9 (L) pg      MCHC 33.0 g/dL      RDW 16.6 (H) %      RDW-SD 49.1 fl      MPV 8.8 fL      Platelets 185 10*3/mm3      Neutrophil % 64.5 %      Lymphocyte % 15.3 %      Monocyte % 10.9 %      Eosinophil % 7.8 (H) %      Basophil % 0.8 %      Immature Grans % 0.7 %      Neutrophils, Absolute 3.89 10*3/mm3      Lymphocytes, Absolute 0.92 10*3/mm3      Monocytes, Absolute 0.66 10*3/mm3      Eosinophils, Absolute 0.47 10*3/mm3      Basophils, Absolute 0.05 10*3/mm3      Immature Grans, Absolute 0.04 (H) 10*3/mm3      nRBC 0.0 /100 WBC     Comprehensive Metabolic Panel [[id number]]  (Abnormal) Collected:  08/27/18 0340    Specimen:  Blood Updated:  08/27/18 0405     Glucose 125 (H) mg/dL      BUN 34 (H) mg/dL      Creatinine 2.76 (H) mg/dL      Sodium 138  mmol/L      Potassium 4.4 mmol/L      Chloride 107 mmol/L      CO2 24.0 mmol/L      Calcium 8.1 (L) mg/dL      Total Protein 5.6 (L) g/dL      Albumin 2.60 (L) g/dL      ALT (SGPT) 45 U/L      AST (SGOT) 78 (H) U/L      Alkaline Phosphatase 89 U/L      Total Bilirubin 1.2 (H) mg/dL      eGFR Non African Amer 23 (L) mL/min/1.73      Globulin 3.0 gm/dL      A/G Ratio 0.9 (L) g/dL      BUN/Creatinine Ratio 12.3     Anion Gap 7.0 mmol/L     CK [[id number]]  (Abnormal) Collected:  08/27/18 0340    Specimen:  Blood Updated:  08/27/18 0405     Creatine Kinase 397 (H) U/L     Protime-INR [[id number]]  (Abnormal) Collected:  08/27/18 0340    Specimen:  Blood Updated:  08/27/18 0401     Protime 17.7 (H) Seconds      INR 1.41 (H)    Procalcitonin [[id number]]  (Normal) Collected:  08/26/18 1034    Specimen:  Blood Updated:  08/26/18 1149     Procalcitonin <0.25 ng/mL     TSH [[id number]]  (Abnormal) Collected:  08/26/18 1034    Specimen:  Blood Updated:  08/26/18 1124     TSH 8.700 (H) mIU/mL     BNP [[id number]]  (Abnormal) Collected:  08/26/18 1034    Specimen:  Blood Updated:  08/26/18 1103     proBNP 18,500.0 (H) pg/mL     Magnesium [[id number]]  (Normal) Collected:  08/26/18 1034    Specimen:  Blood Updated:  08/26/18 1054     Magnesium 2.0 mg/dL     Lipase [[id number]]  (Normal) Collected:  08/26/18 1034    Specimen:  Blood Updated:  08/26/18 1054     Lipase 166 U/L     Lactic Acid, Plasma [[id number]]  (Normal) Collected:  08/26/18 1034    Specimen:  Blood Updated:  08/26/18 1053     Lactate 1.6 mmol/L     Hemoglobin A1c [[id number]] Collected:  08/26/18 0308    Specimen:  Blood Updated:  08/26/18 0804     Hemoglobin A1C 4.7 %     Narrative:       Less than 6.0           Non-Diabetic Range  6.0-7.0                 ADA Therapeutic Target  Greater than 7.0        Action Suggested    Vitamin B12 [[id number]]  (Normal) Collected:  08/25/18 2231    Specimen:  Blood Updated:  08/26/18 0633     Vitamin B-12 798 pg/mL     Folate  [[id number]] Collected:  08/25/18 2231    Specimen:  Blood Updated:  08/26/18 0633     Folate 9.92 ng/mL     Ferritin [[id number]]  (Normal) Collected:  08/25/18 2231    Specimen:  Blood Updated:  08/26/18 0602     Ferritin 161.00 ng/mL     Sedimentation Rate [[id number]]  (Normal) Collected:  08/26/18 0308    Specimen:  Blood Updated:  08/26/18 0538     Sed Rate 12 mm/hr     Iron Profile [[id number]]  (Abnormal) Collected:  08/25/18 2231    Specimen:  Blood Updated:  08/26/18 0534     Iron 23 (L) mcg/dL      TIBC 207 (L) mcg/dL      Iron Saturation 11 (L) %     Reticulocytes [[id number]]  (Abnormal) Collected:  08/26/18 0308    Specimen:  Blood Updated:  08/26/18 0520     Reticulocyte % 3.19 (H) %      Reticulocyte Absolute 0.0925 10*6/mm3     C-reactive Protein [[id number]]  (Abnormal) Collected:  08/26/18 0308    Specimen:  Blood Updated:  08/26/18 0517     C-Reactive Protein 5.41 (H) mg/dL     Troponin [[id number]]  (Abnormal) Collected:  08/26/18 0308    Specimen:  Blood Updated:  08/26/18 0402     Troponin I 0.243 (H) ng/mL     Lactic Acid, Plasma [[id number]]  (Normal) Collected:  08/26/18 0308    Specimen:  Blood Updated:  08/26/18 0351     Lactate 1.8 mmol/L     Comprehensive Metabolic Panel [[id number]]  (Abnormal) Collected:  08/26/18 0308    Specimen:  Blood Updated:  08/26/18 0351     Glucose 90 mg/dL      BUN 33 (H) mg/dL      Creatinine 2.41 (H) mg/dL      Sodium 139 mmol/L      Potassium 4.8 mmol/L      Chloride 107 mmol/L      CO2 23.0 (L) mmol/L      Calcium 7.9 (L) mg/dL      Total Protein 5.5 (L) g/dL      Albumin 2.20 (L) g/dL      ALT (SGPT) 46 U/L      AST (SGOT) 106 (H) U/L      Alkaline Phosphatase 82 U/L      Total Bilirubin 2.2 (H) mg/dL      eGFR Non African Amer 27 (L) mL/min/1.73      Globulin 3.3 gm/dL      A/G Ratio 0.7 (L) g/dL      BUN/Creatinine Ratio 13.7     Anion Gap 9.0 mmol/L     Magnesium [[id number]]  (Normal) Collected:  08/26/18 0308    Specimen:  Blood Updated:  08/26/18 0354      Magnesium 1.9 mg/dL     Phosphorus [[id number]]  (Abnormal) Collected:  08/26/18 0308    Specimen:  Blood Updated:  08/26/18 0351     Phosphorus 4.7 (H) mg/dL     CBC Auto Differential [[id number]]  (Abnormal) Collected:  08/26/18 0308    Specimen:  Blood Updated:  08/26/18 0347     WBC 9.96 10*3/mm3      RBC 2.84 (L) 10*6/mm3      Hemoglobin 7.9 (L) g/dL      Hematocrit 23.9 (L) %      MCV 84.2 fL      MCH 27.8 (L) pg      MCHC 33.1 g/dL      RDW 16.3 (H) %      RDW-SD 48.6 fl      MPV 9.1 fL      Platelets 230 10*3/mm3      Neutrophil % 77.0 %      Lymphocyte % 10.3 (L) %      Monocyte % 9.8 %      Eosinophil % 2.0 %      Basophil % 0.3 %      Immature Grans % 0.6 %      Neutrophils, Absolute 7.66 10*3/mm3      Lymphocytes, Absolute 1.03 10*3/mm3      Monocytes, Absolute 0.98 10*3/mm3      Eosinophils, Absolute 0.20 10*3/mm3      Basophils, Absolute 0.03 10*3/mm3      Immature Grans, Absolute 0.06 (H) 10*3/mm3      nRBC 0.0 /100 WBC     POC Occult Blood Stool [[id number]]  (Normal) Collected:  08/26/18 0115    Specimen:  Stool Updated:  08/26/18 0117     Fecal Occult Blood Negative     Lot Number 129     Expiration Date \3/31/2019\     DEVELOPER LOT NUMBER 129     DEVELOPER EXPIRATION DATE \3/31/2019\     Positive Control Positive     Negative Control Negative    Protime-INR [[id number]]  (Abnormal) Collected:  08/25/18 2231    Specimen:  Blood Updated:  08/26/18 0020     Protime 20.0 (H) Seconds      INR 1.64 (H)    Basic Metabolic Panel [[id number]]  (Abnormal) Collected:  08/25/18 2231    Specimen:  Blood Updated:  08/26/18 0019     Glucose 98 mg/dL      BUN 34 (H) mg/dL      Creatinine 2.26 (H) mg/dL      Sodium 137 mmol/L      Potassium 4.9 mmol/L      Chloride 107 mmol/L      CO2 23.0 (L) mmol/L      Calcium 7.9 (L) mg/dL      eGFR Non African Amer 29 (L) mL/min/1.73      BUN/Creatinine Ratio 15.0     Anion Gap 7.0 mmol/L     Narrative:       GFR Normal >60  Chronic Kidney Disease <60  Kidney Failure <15    CBC  (No Diff) [[id number]]  (Abnormal) Collected:  08/25/18 2231    Specimen:  Blood Updated:  08/26/18 0015     WBC 10.91 (H) 10*3/mm3      RBC 2.85 (L) 10*6/mm3      Hemoglobin 7.9 (L) g/dL      Hematocrit 23.7 (L) %      MCV 83.2 fL      MCH 27.7 (L) pg      MCHC 33.3 g/dL      RDW 16.1 (H) %      RDW-SD 47.2 fl      MPV 9.4 fL      Platelets 249 10*3/mm3     Ethanol [[id number]]  (Normal) Collected:  08/25/18 2231    Specimen:  Blood Updated:  08/26/18 0000     Ethanol % <0.010 %     Narrative:       Not for legal purposes. Chain of Custody not followed.     Troponin [[id number]]  (Abnormal) Collected:  08/25/18 2231    Specimen:  Blood Updated:  08/25/18 2259     Troponin I 0.243 (H) ng/mL         Imaging Results (all)     Procedure Component Value Units Date/Time    CT Head Without Contrast [[id number]] Collected:  09/03/18 1119     Updated:  09/03/18 1124    Narrative:       CT HEAD WO CONTRAST- 9/3/2018 10:46 AM CDT     HISTORY: Subdural hemorrhage and altered mental status       COMPARISON: 8/31/2018      DOSE LENGTH PRODUCT: 1091 mGy cm. Automated exposure control was also  utilized to decrease patient radiation dose.     TECHNIQUE: Helical tomographic images of the brain were obtained without  the use of intravenous contrast.      FINDINGS:   The RIGHT frontal and parietal subdural hemorrhage measures 1.4 cm in  thickness, unchanged from the previous exam. A hyperdense lesion is  again seen in the high RIGHT frontal region. This is unchanged. Mild  changes of chronic microvascular ischemia are again identified. There is  no evidence of hydrocephalus or new hemorrhage.     The bones and soft tissues are unchanged.       Impression:       1. Stable CT of the brain.  2. No change in the moderate-sized RIGHT frontoparietal subdural  hemorrhage.  This report was finalized on 09/03/2018 11:21 by Dr. Brenton Horne MD.    CT Head Without Contrast [[id number]] Collected:  08/31/18 1321     Updated:  08/31/18 1332     Narrative:       EXAM: CT OF THE HEAD WITHOUT IV CONTRAST 8/31/2018.     COMPARISON: Head CT dated 8/29/2018      INDICATION: Male, 68 years-old. Subdural hemorrhage      PROCEDURE: Non contrast enhanced head CT was performed.      Radiation dose equals  mGy-cm.  Automated exposure control dose  reduction technique was implemented.     FINDINGS:      Since most recent head CT dated 8/29/2018, there has been no significant  interval change in the right near hemispheric subdural hematoma. Midline  shift to the left is unchanged, approximately 3 mm. Right frontal  cavernous malformation unchanged. No new focus of intracranial  hemorrhage. No acute hydrocephalus.       Impression:       No significant interval changes.  This report was finalized on 08/31/2018 13:28 by Dr. Stefani Dunaway MD.     Thoracentesis [[id number]] Collected:  08/30/18 1413    Specimen:  Body Fluid Updated:  08/31/18 1151    Narrative:       DATE OF PROCEDURE:  8/30/2018.     PREPROCEDURE DIAGNOSIS:  Right pleural effusion.  POSTPROCEDURE DIAGNOSIS:  Right pleural effusion.          INDICATIONS FOR PROCEDURE: Dyspnea     PROCEDURE:   1. Thoracentesis, diagnostic and therapeutic.  2. Ultrasound guidance for thoracentesis, imaging supervision and  interpretation.     ANESTHESIA: 1% lidocaine, injected locally.          COMPLICATIONS: None.        EXAMINATIONS FOR COMPARISON:  CT chest dated 8/29/2018.     DESCRIPTION OF PROCEDURE:   The risk and benefits of the procedure were explained to the patient.   Specifically, the risks of bleeding, infection, pneumothorax (possible  thoracostomy tube), and damage to surrounding structures were discussed  extensively. The patient's questions were answered. The patient opted to  proceed. Written and verbal consent were obtained from the patient.     TIME OUT was taken and the patient's name, medical record number, date  of birth, procedure, entry site, and listen allergies were confirmed.  The site of  the procedure was confirmed and marked.      The rightposterior thorax was prepped and draped in sterile fashion. The  area was anesthetized with buffered lidocaine 2%.      A 5 Iraqi 10 cm  catheter was inserted into the pleural effusion  using  ultrasound guidance. Approximately 1500 mL of tea colored fluid was  recovered and sent to the pathology lab for analysis.      The patient tolerated the procedure well.   No immediate complications  were noted.       Impression:       Successful ultrasound guided rightthoracentesis.. No immediate  complications were noted.              This report was finalized on 08/30/2018 14:34 by Dr. Stefani Dunaway MD.     Chest [[id number]] Collected:  08/30/18 1413     Updated:  08/31/18 1151    Narrative:       DATE OF PROCEDURE:  8/30/2018.     PREPROCEDURE DIAGNOSIS:  Right pleural effusion.  POSTPROCEDURE DIAGNOSIS:  Right pleural effusion.          INDICATIONS FOR PROCEDURE: Dyspnea     PROCEDURE:   1. Thoracentesis, diagnostic and therapeutic.  2. Ultrasound guidance for thoracentesis, imaging supervision and  interpretation.     ANESTHESIA: 1% lidocaine, injected locally.          COMPLICATIONS: None.        EXAMINATIONS FOR COMPARISON:  CT chest dated 8/29/2018.     DESCRIPTION OF PROCEDURE:   The risk and benefits of the procedure were explained to the patient.   Specifically, the risks of bleeding, infection, pneumothorax (possible  thoracostomy tube), and damage to surrounding structures were discussed  extensively. The patient's questions were answered. The patient opted to  proceed. Written and verbal consent were obtained from the patient.     TIME OUT was taken and the patient's name, medical record number, date  of birth, procedure, entry site, and listen allergies were confirmed.  The site of the procedure was confirmed and marked.      The rightposterior thorax was prepped and draped in sterile fashion. The  area was anesthetized with buffered lidocaine 2%.       A 5 [place] 10 cm  catheter was inserted into the pleural effusion  using  ultrasound guidance. Approximately 1500 mL of tea colored fluid was  recovered and sent to the pathology lab for analysis.      The patient tolerated the procedure well.   No immediate complications  were noted.       Impression:       Successful ultrasound guided rightthoracentesis.. No immediate  complications were noted.              This report was finalized on 08/30/2018 14:34 by Dr. Stefani Dunaway MD.    MRI Brain With & Without Contrast [[id number]] Collected:  08/30/18 1458     Updated:  08/30/18 1518    Narrative:       EXAM: MR BRAIN WITHOUT AND WITH IV CONTRAST 8/30/2018     COMPARISON: Head CT dated 8/29/2018      HISTORY: 68 years-old Male. Right frontal calcification;  I62.00-Nontraumatic subdural hemorrhage, unspecified; N17.9-Acute kidney  failure, unspecified; E86.0-Dehydration; D64.9-Anemia, unspecified;  M62.82-Rhabdomyolysis; F10.10-Alcohol abuse, uncomplicated;  W19.XXXA-Unspecified fall, initial encounter; T07.XXXA-Unspecified  multiple injuries, initial encounter; R74.8-Abnormal levels of other  serum enzymes; Z74.09-Other reduce     TECHNIQUE:   Routine pulse sequences were obtained of the brain before and after the  administration of IV contrast.      REPORT:      There is a near right hemispheric subdural hematoma, with maximum  thickness of 1.5 cm. 3 mm right to left midline shift. There is no  evidence of intraventricular hemorrhage.     The previously seen hyperdensity in head CT centered in the right  superior frontal gyrus corresponds with a cavernous malformation  measuring 2 cm. There is no associated T2/FLAIR abnormal signal to  suggest recent hemorrhage.        There is no diffusion restriction within the brain parenchyma to suggest  acute infarct. No acute hydrocephalus.       Impression:       1.  known-right right near hemispheric subdural hematoma.  2.  Right frontal cavernous malformation.  This report  was finalized on 08/30/2018 15:15 by Dr. Stefani Dunaway MD.    XR Chest AP [[id number]] Collected:  08/30/18 1414     Updated:  08/30/18 1417    Narrative:       XR CHEST AP- 8/30/2018 2:06 PM CDT     HISTORY: Post thoracentesis of the RIGHT pleural effusion     COMPARISON: 8/29/2018.     FINDINGS:   The RIGHT pleural effusion has significantly improved. There is no  evidence of pneumothorax. The LEFT lung is stable. The cardiomediastinal  silhouette and pulmonary vascularity are unchanged.      The osseous structures and surrounding soft tissues demonstrate no acute  abnormality.       Impression:       1. No pneumothorax after RIGHT thoracentesis.        This report was finalized on 08/30/2018 14:14 by Dr. Bretnon Horne MD.    CT Chest Without Contrast [[id number]] Collected:  08/29/18 1115     Updated:  08/29/18 1120    Narrative:       EXAMINATION: CT CHEST WO CONTRAST-      8/29/2018 10:59 AM CDT     HISTORY: opacification of the right lung on cxr; I62.00-Nontraumatic  subdural hemorrhage, unspecified; N17.9-Acute kidney failure,  unspecified; E86.0-Dehydration; D64.9-Anemia, unspecified;  M62.82-Rhabdomyolysis; F10.10-Alcohol abuse, uncomplicated;  W19.XXXA-Unspecified fall, initial encounter; T07.XXXA-Unspecified  multiple injuries, initial encounter; R74.8-Abnormal levels of other  serum enzymes; Z74.09-     In order to have a CT radiation dose as low as reasonably achievable  Automated Exposure Control was utilized for adjustment of the mA and/or  KV according to patient size.     DLP in mGycm= 280.     Noncontrast chest CT compared with chest x-ray from 6:44 AM.     Large right pleural effusion completely fills the right hemithorax.     The right lung is compressed down into the perihilar region.     Cardiomegaly with coronary artery calcification.     Adequately expanded left lung. Trace amount of left pleural fluid and  patchy alveolar infiltrate is seen within the left upper lobe.     Prominent diffuse  thoracic spurring.     Ascites noted within the upper abdomen.  Bilateral gynecomastia is present.     Summary:  1. Large right pleural effusion with complete compression of the right  lung.  2. Trace left pleural fluid with patchy left upper lobe infiltrate  compatible with pneumonia.  3. Cardiomegaly.                                   This report was finalized on 08/29/2018 11:17 by Dr. Tyler Stanley MD.    CT Head Without Contrast [[id number]] Collected:  08/29/18 0931     Updated:  08/29/18 0936    Narrative:       EXAMINATION: CT HEAD WO CONTRAST-      8/29/2018 9:12 AM CDT     HISTORY: sdh; I62.00-Nontraumatic subdural hemorrhage, unspecified;  N17.9-Acute kidney failure, unspecified; E86.0-Dehydration;  D64.9-Anemia, unspecified; M62.82-Rhabdomyolysis; F10.10-Alcohol abuse,  uncomplicated; W19.XXXA-Unspecified fall, initial encounter;  T07.XXXA-Unspecified multiple injuries, initial encounter;  R74.8-Abnormal levels of other serum enzymes; Z74.09-Other reduced  mobility; R53.1-Weakn     In order to have a CT radiation dose as low as reasonably achievable  Automated Exposure Control was utilized for adjustment of the mA and/or  KV according to patient size.     DLP in mGycm= 690.     Noncontrast head CT compared with 08/27/2018.     Stable mildly hyperdense right frontal subdural hemorrhage measuring  approximately 6.4 x 4.7 cm in diameter and 1.3 cm in thickness.  Stable mild localized mass effect.     Unchanged right frontal lobe hyperdense focus with focal calcification.     Stable ventricle size.     No new or increased abnormality.     Summary:  1. No change.                                   This report was finalized on 08/29/2018 09:33 by Dr. Tyler Stanley MD.    XR Chest 1 View [[id number]] Collected:  08/29/18 0701     Updated:  08/29/18 0708    Narrative:       EXAM: XR CHEST 1 VW- - 8/29/2018 6:49 AM CDT     HISTORY: Wheezing; I62.00-Nontraumatic subdural hemorrhage, unspecified;  N17.9-Acute kidney  failure, unspecified; E86.0-Dehydration;  D64.9-Anemia, unspecified; M62.82-Rhabdomyolysis; F10.10-Alcohol abuse,  uncomplicated; W19.XXXA-Unspecified fall, initial encounter;  T07.XXXA-Unspecified multiple injuries, initial encounter;  R74.8-Abnormal levels of other serum enzymes; Z74.09-Other reduced  mobility; R53.1-       COMPARISON: None.      TECHNIQUE:  1 images.  Frontal view of the chest     FINDINGS:    Complete opacification of the right lung field. Left lung appears clear  without pneumothorax, pleural effusion or focal consolidation. Right  cardiac and mediastinal silhouette is obscured, the left appears within  normal limits. No acute findings of the visualized bones or upper  abdomen. Small calcification at the left neck may represent carotid  artery atherosclerosis.          Impression:       1. Complete opacification of the right lung. This could be due to  infiltrate, pleural effusion, hemothorax, and/or mass.  2. Left lung clear.  3. Possible left calcified carotid artery atherosclerosis.  This report was finalized on 08/29/2018 07:05 by Dr Tabatha Pacheco MD.    XR Spine Lumbar AP & Lateral [[id number]] Collected:  08/27/18 1837     Updated:  08/27/18 1841    Narrative:       EXAMINATION:   XR SPINE LUMBAR AP AND LATERAL-  8/27/2018 6:37 PM CDT     HISTORY: Patient fell     3 views the lumbar spine are obtained. Lumbar vertebra are relatively  aligned. Loss height of the L4-5 disc.     Prominent hypertrophic in      syndesmophyte formations present at L1-2 and L2-3 levels. Hypertrophic  degenerative changes present the L4-5 level. Anterior hypertrophic  osteophytes are present the L1-2, L2-3 and L3-4 levels.     SI joints are normal.     IMPRESSION hypertrophic degenerative changes noted throughout the lumbar  spine.  This report was finalized on 08/27/2018 18:38 by Dr. Tacho Frias MD.    XR Spine Thoracic 2 View [[id number]] Collected:  08/27/18 1836     Updated:  08/27/18 1839    Narrative:        XR SPINE THORACIC 2 VW- 8/27/2018 6:04 PM CDT     HISTORY: fall; I62.00-Nontraumatic subdural hemorrhage, unspecified;  N17.9-Acute kidney failure, unspecified; E86.0-Dehydration;  D64.9-Anemia, unspecified; M62.82-Rhabdomyolysis; F10.10-Alcohol abuse,  uncomplicated; W19.XXXA-Unspecified fall, initial encounter;  T07.XXXA-Unspecified multiple injuries, initial encounter;  R74.8-Abnormal levels of other serum enzymes; Z74.09-Other reduced  mobility; R53.1-Weak     COMPARISON: None     FINDINGS:  Frontal, lateral and swimmers lateral radiographs of the thoracic spine  demonstrate normal alignment without evidence of compression fracture or  subluxation. The pedicles are intact. Prominent anterior hypertrophic  degenerative changes noted in the mid and lower thoracic spine.  Ossification anterior longitudinal ligament is noted.     The visualized thorax is clear.        Impression:       1. Hypertrophic degenerative spondylosis is noted throughout the  thoracic spine. No acute compression deformity is identified..     This report was finalized on 08/27/2018 18:36 by Dr. Tacho Frias MD.    CT Cervical Spine Without Contrast [[id number]] Collected:  08/27/18 1833     Updated:  08/27/18 1838    Narrative:       EXAMINATION:   CT CERVICAL SPINE WO CONTRAST-  8/27/2018 6:33 PM CDT     HISTORY: CT CERVICAL SPINE without contrast dated 8/27/2018 6:13 PM CDT     HISTORY: fall; I62.00-Nontraumatic subdural hemorrhage, unspecified;  N17.9-Acute kidney failure, unspecified; E86.0-Dehydration;  D64.9-Anemia, unspecified; M62.82-Rhabdomyolysis; F10.10-Alcohol abuse,  uncomplicated; W19.XXXA-Unspecified fall, initial encounter;  T07.XXXA-Unspecified multiple injuries, initial encounter;  R74.8-Abnormal levels of other serum enzymes; Z74.09-Other reduced  mobility; R53.1-Weak     COMPARISON: None      DOSE LENGTH PRODUCT: 302 mGy cm     TECHNIQUE: Serial helical tomographic images of the cervical spine were  obtained without  the use of intravenous contrast. Additionally, sagittal  and coronal reformatted images were also provided for review.      FINDINGS:   Multilevel degenerative changes are seen in the cervical spine. Loss of  height of the C4-5 C5-6 and C6-7 disc space levels. Uncinate spurring is  present bilaterally to C5-6 and C6-7 levels.     The odontoid process is intact. There is hypertrophic degenerative  change in the posterior facets the C2-3 level     Ossification of the nuchal ligament is noted.      There is no evidence of acute fracture or subluxation. The prevertebral  soft tissues are within normal limits. The posterior elements are  intact. Vertebral body heights are maintained.     The visualized skull base is intact. The visualized thorax demonstrates  no acute abnormality.       Impression:       1. Degenerative changes in the cervical spine without evidence of acute  osseous injury.     This report was finalized on 08/27/2018 18:35 by Dr. Tacho Frias MD.    US Renal Bilateral [[id number]] Collected:  08/27/18 1554     Updated:  08/27/18 1559    Narrative:       EXAM:  US RENAL BILATERAL-     INDICATION:  Acute renal failure     COMPARISON:  None     TECHNIQUE:  Routine grayscale and color Doppler images were obtained  through the bilateral renal fossae.     FINDINGS:       Right Kidney: The right kidney measures 10.9 cm in longitudinal  dimension. There is good corticomedullary differentiation. There is no  evidence of hydronephrosis. There are no cystic lesions or masses. No  echogenic stone.     Left Kidney: The left kidney measures 10.1 cm in longitudinal dimension.  There is good corticomedullary differentiation. There is no evidence of  hydronephrosis. There are no cystic lesions or masses. No echogenic  stone.     Bladder: The bladder is unremarkable without evidence of mass or  internal debris.  Ascites    Impression:       1.  Unremarkable appearance of the kidneys.  2.  Ascites.     This report was  finalized on 08/27/2018 15:55 by Dr. Stefani Dunaway MD.    CT Head Without Contrast [[id number]] Collected:  08/27/18 0723     Updated:  08/27/18 0736    Narrative:       CT HEAD WO CONTRAST- 8/27/2018 4:44 AM CDT     HISTORY: sdh; I62.00-Nontraumatic subdural hemorrhage, unspecified;  N17.9-Acute kidney failure, unspecified; E86.0-Dehydration;  D64.9-Anemia, unspecified; M62.82-Rhabdomyolysis; F10.10-Alcohol abuse,  uncomplicated; W19.XXXA-Unspecified fall, initial encounter;  T07.XXXA-Unspecified multiple injuries, initial encounter;  R74.8-Abnormal levels of other serum enzymes       DOSE LENGTH PRODUCT: 1236 mGy cm. Automated exposure control was also  utilized to decrease patient radiation dose.     Technique:   Axial CT of the brain without IV contrast. Sagittal and coronal  reformations are also provided for review. Soft tissue and bone kernels  are available for interpretation.     Comparison: CT scan dated 8/26/2018.     Findings:      Stable mixed-density right cerebral convexity subdural hematoma  measuring up to 13 mm in thickness (series 5-image 24). A 2.6 cm  hyperdensity is noted in the paramedian posterior right frontal lobe  (series 7-image 27). There are speckled areas of dense calcification  more inferiorly along this hyperdensity. There does not appear to be  obvious volume loss in this area to suggest an old injury. No mass  effect or adjacent vasogenic edema.     There is no evidence of acute large vascular distribution infarct.  The  ventricles, cortical sulci and basal cisterns are symmetric and age  appropriate.  Normal brainstem and posterior fossa.  The scalp and  calvarium are unremarkable. Visualized paranasal sinuses and mastoids  are clear.        Impression:       Impression:    1. Stable mixed-density (acute on chronic) right convexity subdural  hematoma. No significant mass effect.  2.  Stable 2.6 cm intraparenchymal hyperdensity in the paramedian  posterior right frontal lobe. This  does not appear acute, unlikely acute  intracranial hemorrhage. Appearance would be unusual for neoplasm.  Consider MRI.  This report was finalized on 08/27/2018 07:33 by Dr Jaquan Roth, .    XR Shoulder 2+ View Left [[id number]] Collected:  08/26/18 1150     Updated:  08/26/18 1156    Narrative:       EXAM: XR SHOULDER 2+ VW LEFT- - 8/26/2018 9:57 AM CDT     HISTORY: pain after fall; I62.00-Nontraumatic subdural hemorrhage,  unspecified; N17.9-Acute kidney failure, unspecified; E86.0-Dehydration;  D64.9-Anemia, unspecified; M62.82-Rhabdomyolysis; F10.10-Alcohol abuse,  uncomplicated; W19.XXXA-Unspecified fall, initial encounter;  T07.XXXA-Unspecified multiple injuries, initial encounter;  R74.8-Abnormal levels of other serum enzymes       COMPARISON: None.      TECHNIQUE:  2 images.  Portable AP internal rotation and AP external  rotation views of the left shoulder.     FINDINGS:    Limited assessment of alignment on provided views. No displaced  fracture. No aggressive bony lesion. Degenerative changes at the  acromioclavicular joint. Apparent superior migration of the humeral head  may indicate chronic rotator cuff injury.          Impression:       1. Limited assessment of alignment on portable views.  2. No acute bony finding.  3. Apparent superior migration of the humeral head may indicate chronic  rotator cuff injury or this finding could be positional.  This report was finalized on 08/26/2018 11:53 by Dr Tabatha Pacheco MD.    XR Pelvis 1 or 2 View [[id number]] Collected:  08/26/18 1143     Updated:  08/26/18 1147    Narrative:       EXAM: XR PELVIS 1 OR 2 VW- - 8/25/2018 11:31 PM CDT     HISTORY: fall       COMPARISON: None.      TECHNIQUE:  1 images.  Frontal view of the pelvis     FINDINGS:    No displaced fracture or aggressive bony lesion. Degenerative changes at  the bilateral hips. Sacroiliac joints and pubic symphysis anatomic.  Sacral arcuate lines appear intact. Degenerative changes at  the  lumbosacral spine. Pelvic phleboliths. Vascular calcifications. Surgical  clips overlie crying soft tissues, possibly vasectomy.          Impression:       1. No acute bony finding.  2. Vascular calcifications.  This report was finalized on 08/26/2018 11:44 by Dr Tabatha Pacheco MD.    CT Head Without Contrast [[id number]] Collected:  08/26/18 1047     Updated:  08/26/18 1053    Narrative:       EXAM: CT HEAD WO CONTRAST- - 8/26/2018 8:13 AM CDT     HISTORY: Subdural hematoma; I62.00-Nontraumatic subdural hemorrhage,  unspecified; N17.9-Acute kidney failure, unspecified; E86.0-Dehydration;  D64.9-Anemia, unspecified; M62.82-Rhabdomyolysis; F10.10-Alcohol abuse,  uncomplicated; W19.XXXA-Unspecified fall, initial encounter;  T07.XXXA-Unspecified multiple injuries, initial encounter;  R74.8-Abnormal levels of other serum enzymes       COMPARISON: 8/25/2018.      DOSE LENGTH PRODUCT: 715 mGy cm. Automated exposure control was also  utilized to decrease patient radiation dose.     TECHNIQUE: Unenhanced axial CT images obtained from vertex to skull  base.     FINDINGS:  Redemonstration of right frontoparietal extra-axial hematoma measuring  14 mm in thickness. No significant change. Similar mild mass effect on  the adjacent sulci. No midline shift.     Similar hyperdensity at the right frontal lobe with small associated  calcifications.     Ventricles, remaining sulci and basilar cisterns within normal limits.  Midline structures anatomic.     Globes, retrobulbar soft tissues, paranasal sinuses, mastoid air cells  and external auditory canals are within normal limits. No depressed  skull fracture. Similar mild swelling of the right frontoparietal scalp.       Impression:       1. Similar size of right frontoparietal extra-axial hematoma. No midline  shift.  2. Similar hyperdensity of the right frontal lobe.  This report was finalized on 08/26/2018 10:50 by Dr Tabatha Pacheco MD.    CT Head Without Contrast [[id number]]  "Collected:  08/26/18 1031     Updated:  08/26/18 1050    Narrative:       EXAM: CT HEAD WO CONTRAST- - 8/25/2018 11:18 PM CDT     HISTORY: etoh fall       COMPARISON: Subsequent exam 8/26/2018.      DOSE LENGTH PRODUCT: 783 mGy cm. Automated exposure control was also  utilized to decrease patient radiation dose.     TECHNIQUE: Unenhanced axial CT images obtained from vertex to skull  base. Preliminary interpretation performed by stat vero 8/25/2018 at 2348  hours.     FINDINGS:  There is hyperdense extra-axial fluid collection at the right  frontoparietal convexity, measuring 14 mm in thickness and up to 7.8 cm  in AP dimension on axial image 20. Mild mass effect on the adjacent  sulci. No midline shift.     Hyperdensity with possible small calcification at the superior right  frontal lobe cortex and subcortical white matter.      Ventricles, sulci and basilar cisterns within normal limits. Midline  structures anatomic.     Globes, retrobulbar soft tissues, paranasal sinuses, mastoid air cells  and external auditory canals are within normal limits. Calvarium appears  intact. Thickening of the right frontoparietal subcutaneous tissues,  likely hematoma.       Impression:       1. Right frontoparietal extra-axial hematoma measuring 14 mm in  thickness. Mild mass effect on the adjacent sulci. No midline shift.  2. Hyperdensity at the high right frontal lobe of uncertain chronicity,  could represent parenchymal hemorrhage, sequelae of prior infection or  vascular anomaly.  This report was finalized on 08/26/2018 10:47 by Dr Tabatha Pacheco MD.          Condition on Discharge:    Declining    Physical Exam on Discharge:  BP (!) 81/47 (BP Location: Right arm, Patient Position: Lying)   Pulse 50   Temp 96.5 °F (35.8 °C) (Axillary)   Resp 20   Ht 188 cm (74\")   Wt 94.9 kg (209 lb 4.8 oz)   SpO2 98%   BMI 26.87 kg/m²   Physical Exam  Constitutional: The patient is currently lying in bed.  He is confused.  The patient's " sister and daughter are present in the room.  Head: Normocephalic and atraumatic.   Eyes: Pupils are equal, round, and reactive to light. Conjunctivae and EOM are normal.   Neck: Neck supple. No JVD present.   Cardiovascular: Normal rate, regular rhythm, normal heart sounds and intact distal pulses.  Exam reveals no gallop and no friction rub. No murmur heard.  Pulmonary/Chest: Effort normal. No respiratory distress. Diminished breath sounds in the right lung base. Remains on room air.    Abdominal: Soft. Bowel sounds are normal. He exhibits distension (softer today). There is no tenderness. There is no rebound and no guarding. A hernia (reducible umbilical) is present.   Musculoskeletal: Normal range of motion. He exhibits no edema, tenderness or deformity.   Neurological: Awake, alert. Oriented to person and place. He displays normal reflexes. No cranial nerve deficit. He exhibits normal muscle tone. No tremor.  Generalized weakness, nonfocal.   Skin: Skin is warm and dry. No rash noted. Widespread healing abrasions and bruises.    Psychiatric: He has a normal mood and affect. His behavior is normal. Judgment and thought content normal.    Discharge Disposition:  Skilled Nursing Facility (DC - External)    Discharge Medications:     Discharge Medications      New Medications      Instructions Start Date   QUEtiapine 25 MG tablet  Commonly known as:  SEROquel   25 mg, Oral, Nightly      tamsulosin 0.4 MG capsule 24 hr capsule  Commonly known as:  FLOMAX   0.4 mg, Oral, Daily         Continue These Medications      Instructions Start Date   hydrOXYzine 10 MG tablet  Commonly known as:  ATARAX   10 mg, Oral, 4 Times Daily      omeprazole 20 MG capsule  Commonly known as:  priLOSEC   20 mg, Oral, Daily      propranolol 10 MG tablet  Commonly known as:  INDERAL   10 mg, Oral, 2 Times Daily         Stop These Medications    furosemide 40 MG tablet  Commonly known as:  LASIX     spironolactone 100 MG tablet  Commonly  known as:  ALDACTONE            Discharge Diet:   Diet Instructions     Diet: Regular, Soft Texture; Nectar / Syrup Thick Liquids; Ground; Nectar / Syrup Thick       Discharge Diet:   Regular  Soft Texture       Fluid Consistency:  Nectar / Syrup Thick Liquids    Soft Options:  Ground    Fluid Consistency:  Nectar / Syrup Thick          Discharge Care Plan / Instructions:   Transfer to skilled nursing facility for continuation of palliative care    Activity at Discharge:   Activity Instructions     Activity as Tolerated              Den Elena DO  09/05/18  12:25 PM    Time: Discharge Over 30 min    Please note that portions of this note may have been completed with a voice recognition program. Efforts were made to edit the dictations, but occasionally words are mistranscribed.

## 2018-09-05 NOTE — PROGRESS NOTES
"Ryan Alvarez  68 y.o.      Subjective  Family declines PEG tube, sister at bedside    Temp:  [96.5 °F (35.8 °C)-97.3 °F (36.3 °C)] 96.5 °F (35.8 °C)  Heart Rate:  [46-62] 50  Resp:  [15-20] 18  BP: ()/(40-61) 81/47      Objective    Neurologic Exam    Lethargic  Eyes closed  Localizes  States \"stop bothering me\"  MAEW    Principal Problem:    Subdural hematoma (CMS/HCC)  Active Problems:    Alcohol abuse    ТАТЬЯНА (acute kidney injury) (CMS/HCC)    Umbilical hernia    Abrasions of multiple sites    Cirrhosis of liver (CMS/HCC)    Non-traumatic rhabdomyolysis    Dehydration    Elevated troponin    Anemia      Lab Results (last 24 hours)     Procedure Component Value Units Date/Time    Protime-INR [[id number]]  (Abnormal) Collected:  09/05/18 0803    Specimen:  Blood Updated:  09/05/18 0819     Protime 16.9 (H) Seconds      INR 1.33 (H)    Basic Metabolic Panel [[id number]]  (Abnormal) Collected:  09/05/18 0414    Specimen:  Blood Updated:  09/05/18 0514     Glucose 68 (L) mg/dL      BUN 40 (H) mg/dL      Creatinine 2.55 (H) mg/dL      Sodium 147 (H) mmol/L      Potassium 5.1 mmol/L      Chloride 116 (H) mmol/L      CO2 20.0 (L) mmol/L      Calcium 8.8 mg/dL      eGFR Non African Amer 25 (L) mL/min/1.73      BUN/Creatinine Ratio 15.7     Anion Gap 11.0 mmol/L     Narrative:       GFR Normal >60  Chronic Kidney Disease <60  Kidney Failure <15    AFB Culture - Body Fluid, Pleural Cavity [[id number]]  (Normal) Collected:  08/30/18 1342    Specimen:  Body Fluid from Pleural Cavity Updated:  09/04/18 1416     AFB Culture No AFB isolated at less than 1 week     AFB Stain No acid fast bacilli seen on direct smear      No acid fast bacilli seen on concentrated smear    Fungus Culture - Body Fluid, Pleural Cavity [[id number]] Collected:  08/30/18 1342    Specimen:  Body Fluid from Pleural Cavity Updated:  09/04/18 1416     Fungus Culture No fungus isolated at less than 1 week    Comprehensive Metabolic Panel [[id number]]  " (Abnormal) Collected:  09/04/18 1103    Specimen:  Blood Updated:  09/04/18 1122     Glucose 77 mg/dL      BUN 39 (H) mg/dL      Creatinine 2.34 (H) mg/dL      Sodium 147 (H) mmol/L      Potassium 5.2 mmol/L      Chloride 115 (H) mmol/L      CO2 22.0 (L) mmol/L      Calcium 9.1 mg/dL      Total Protein 6.3 g/dL      Albumin 2.80 (L) g/dL      ALT (SGPT) 39 U/L      AST (SGOT) 50 (H) U/L      Alkaline Phosphatase 93 U/L      Total Bilirubin 0.8 mg/dL      eGFR Non African Amer 28 (L) mL/min/1.73      Globulin 3.5 gm/dL      A/G Ratio 0.8 (L) g/dL      BUN/Creatinine Ratio 16.7     Anion Gap 10.0 mmol/L     Anaerobic Culture - Pleural Fluid, Pleural Cavity [[id number]]  (Normal) Collected:  08/30/18 1342    Specimen:  Pleural Fluid from Pleural Cavity Updated:  09/04/18 1024     Culture No anaerobes isolated at 5 days              Plan:     68-year-old male with history of alcohol abuse and liver cirrhosis found down after 3 days     1) Right subdural hematoma - wax/wane mental status during hospital course, but imaging shows stable hematoma size.  If subdural hematoma enlarges and causes neurologic deficit, discussed with patient and he wishes to proceed with surgery but as last resort with his higher surgical risks.  No lateralizing symptoms at this time.  Hopefully the subdural hematoma will improve without surgical intervention given liver cirrhosis, acute kidney injury, rhabdomyolysis.  Encephalopathy likely multifactorial with multiple medical comorbidities given stable imaging findings.      2) Right frontal lobe calcification with intraparenchymal hemorrhage - MRI shows cavernous malformation     3) Follow up EEG    4) Family declines PEG tube placement, kidney injury worsening.  Family considering palliative care with hepatorenal syndrome.    Alireza Salinas MD

## 2018-09-05 NOTE — PLAN OF CARE
Problem: Fall Risk (Adult)  Goal: Absence of Fall  Outcome: Ongoing (interventions implemented as appropriate)      Problem: Skin Injury Risk (Adult)  Goal: Skin Health and Integrity  Outcome: Ongoing (interventions implemented as appropriate)      Problem: Patient Care Overview  Goal: Plan of Care Review  Outcome: Ongoing (interventions implemented as appropriate)   09/04/18 1800   Coping/Psychosocial   Plan of Care Reviewed With patient   Plan of Care Review   Progress declining   OTHER   Outcome Summary Pt opens eyes slightly when spoken to, not oriented. Made NPO due to inability to swallow effectively. IVF transfusing. Turned frequently to promote skin integrity. Family at bedside most of shift. Possible d/c to [facility] tomorrow.        Problem: Alcohol Withdrawal Acute, Risk/Actual (Adult)  Goal: Signs and Symptoms of Listed Potential Problems Will be Absent, Minimized or Managed (Alcohol Withdrawal Acute, Risk/Actual)  Outcome: Ongoing (interventions implemented as appropriate)      Problem: Nutrition, Imbalanced: Inadequate Oral Intake (Adult)  Goal: Improved Oral Intake  Outcome: Ongoing (interventions implemented as appropriate)      Problem: Brain Injury, Moderate Traumatic (GCS 9-12) (Adult)  Goal: Signs and Symptoms of Listed Potential Problems Will be Absent, Minimized or Managed (Brain Injury, Moderate Traumatic)  Outcome: Ongoing (interventions implemented as appropriate)

## 2018-09-05 NOTE — PROGRESS NOTES
Nephrology ([facility]) Progress Note      Patient:  Ryan Alvarez  YOB: 1950  Date of Service: 9/5/2018  MRN: [id number]   Acct: [id number]   Primary Care Physician: Rufino Stevens APRN  Advance Directive:   Code Status and Medical Interventions:   Ordered at: 08/26/18 0454     Level Of Support Discussed With:    Patient     Code Status:    CPR     Medical Interventions (Level of Support Prior to Arrest):    Full     Admit Date: 8/25/2018       Hospital Day: 10  Referring Provider: Tayo Almonte MD      Patient personally seen and examined.  Complete chart including Consults, Notes, Operative Reports, Labs, Cardiology, and Radiology studies reviewed as able.        Subjective:  Ryan Alvarez is a 68 y.o. male  whom we were consulted for acute kidney injury.  No prior nephrological contacts; unknown renal baseline.  History of ETOH abuse, cirrhosis.  Presented to [facility] after a fall at home with an unknown down time.  He remembers falling outside and crawling back into house; was found some time later by family member.  Diagnosed with ТАТЬЯНА and subdural hematoma.  Transferred to [facility] for higher level of care.  Required pressors briefly after arrival for blood pressure support; these are now discontinued. Received 2 units PRBC on 8/27.  Transferred to medical floor 8/28.  Had right thoracentesis on 8/30 with 1500 ml fluid removed.  Renal function has been declining last few days; minimal oral intake.    Today he is more obtunded; opens eyes to voice but minimal vocal interaction.  No new overnight issues.  Urine output is oliguric.    Allergies:  Patient has no known allergies.    Home Meds:  Prescriptions Prior to Admission   Medication Sig Dispense Refill Last Dose   • furosemide (LASIX) 40 MG tablet Take 40 mg by mouth Daily.      • hydrOXYzine (ATARAX) 10 MG tablet Take 10 mg by mouth 4 (Four) Times a Day.      • omeprazole (priLOSEC)  20 MG capsule Take 20 mg by mouth Daily.      • propranolol (INDERAL) 10 MG tablet Take 10 mg by mouth 2 (Two) Times a Day.      • spironolactone (ALDACTONE) 100 MG tablet Take 100 mg by mouth 2 (Two) Times a Day.          Medicines:  Current Facility-Administered Medications   Medication Dose Route Frequency Provider Last Rate Last Dose   • acetaminophen (TYLENOL) tablet 650 mg  650 mg Oral Q4H PRN Tayo Almonte MD   650 mg at 09/04/18 0538    Or   • acetaminophen (TYLENOL) suppository 650 mg  650 mg Rectal Q4H PRN Tayo Almonte MD   650 mg at 09/02/18 2242   • acetaminophen (TYLENOL) tablet 650 mg  650 mg Oral Q6H PRN Tayo Almonte MD   650 mg at 08/28/18 2128   • flintstones complete (FLINTSTONES) chewable tablet 1 tablet  1 tablet Oral Daily Bennett Manzo DO   1 tablet at 09/04/18 1120   • guaiFENesin (MUCINEX) 12 hr tablet 1,200 mg  1,200 mg Oral Q12H Bennett Manzo DO   1,200 mg at 09/04/18 1121   • Hold medication  1 each Does not apply Continuous PRN Bennett Manzo DO       • hydrOXYzine (ATARAX) tablet 10 mg  10 mg Oral Q6H PRN Bennett Manzo DO       • ipratropium-albuterol (DUO-NEB) nebulizer solution 3 mL  3 mL Nebulization Q4H PRN Bennett Manzo DO   3 mL at 08/31/18 1942   • lactulose solution 30 g  30 g Oral TID Bennett Manzo DO   30 g at 09/04/18 1121   • LORazepam (ATIVAN) injection 0.5 mg  0.5 mg Intravenous Once Alireza Salinas MD       • ondansetron (ZOFRAN) tablet 4 mg  4 mg Oral Q6H PRN Tayo Almonte MD       • pantoprazole (PROTONIX) EC tablet 40 mg  40 mg Oral Q AM Bennett Manzo DO   40 mg at 09/01/18 1148   • propranolol (INDERAL) tablet 10 mg  10 mg Oral Q12H Bennett Manzo DO   10 mg at 09/04/18 1121   • QUEtiapine (SEROquel) tablet 25 mg  25 mg Oral Nightly Den Elena, DO       • sodium bicarbonate tablet 650 mg  650 mg Oral BID Sina Casas APRN   650 mg at 09/04/18 1120   • sodium chloride 0.45 % infusion  100 mL/hr  "Intravenous Continuous Sina Casas, APRN 100 mL/hr at 09/05/18 0345 100 mL/hr at 09/05/18 0345   • sodium chloride 0.9 % flush 1-10 mL  1-10 mL Intravenous PRN Tayo Almonte MD       • tamsulosin (FLOMAX) 24 hr capsule 0.4 mg  0.4 mg Oral Daily Bennett Manzo,    0.4 mg at 09/04/18 1120       Past Medical History:  Past Medical History:   Diagnosis Date   • Alcohol abuse    • Ascites    • Cirrhosis of liver (CMS/HCC)    • Hernia of abdominal wall        Past Surgical History:  Past Surgical History:   Procedure Laterality Date   • BACK SURGERY         Family History  History reviewed. No pertinent family history.    Social History  Social History     Social History   • Marital status:      Spouse name: N/A   • Number of children: N/A   • Years of education: N/A     Occupational History   • Not on file.     Social History Main Topics   • Smoking status: Never Smoker   • Smokeless tobacco: Never Used   • Alcohol use Yes      Comment: at least 6 pack/daily   • Drug use: No   • Sexual activity: Not on file     Other Topics Concern   • Not on file     Social History Narrative   • No narrative on file         Review of Systems:  History obtained from chart review and the patient  General ROS: Patient complains of fatigue and No fever or chills  Respiratory ROS: No cough, shortness of breath.  Cardiovascular ROS: No chest pain or palpitations  Gastrointestinal ROS: No abdominal pain or melena  Genito-Urinary ROS: No dysuria or hematuria  Neurological ROS: no headache or dizziness    Objective:  BP (!) 81/47 (BP Location: Right arm, Patient Position: Lying)   Pulse 50   Temp 96.5 °F (35.8 °C) (Axillary)   Resp 18   Ht 188 cm (74\")   Wt 94.9 kg (209 lb 4.8 oz)   SpO2 96%   BMI 26.87 kg/m²     Intake/Output Summary (Last 24 hours) at 09/05/18 0951  Last data filed at 09/05/18 0345   Gross per 24 hour   Intake                0 ml   Output              150 ml   Net             -150 ml     General: " lethargic/oriented x1   Neck: supple, no JVD  Chest:  diminished right  CVS: regular rate and rhythm  Abdominal: soft, nontender, normal bowel sounds  Extremities: no cyanosis or edema  Skin: warm and dry without rash  Neuro: no focal motor deficits    Labs:    Results from last 7 days  Lab Units 09/01/18  0504 08/31/18  0642 08/30/18  0657   WBC 10*3/mm3 5.30  --  5.05   HEMOGLOBIN g/dL 9.1* 8.9* 8.7*   HEMATOCRIT % 28.6* 27.5* 27.2*   PLATELETS 10*3/mm3 151  --  144           Results from last 7 days  Lab Units 09/05/18  0414 09/04/18  1103 09/03/18  0545  09/01/18  0504 08/31/18  0642   SODIUM mmol/L 147* 147* 147*  < > 143 142   POTASSIUM mmol/L 5.1 5.2 4.9  < > 4.6 4.8   CHLORIDE mmol/L 116* 115* 115*  < > 113* 113*   CO2 mmol/L 20.0* 22.0* 20.0*  < > 22.0* 24.0   BUN mg/dL 40* 39* 36*  < > 31* 29*   CREATININE mg/dL 2.55* 2.34* 1.83*  < > 1.55* 1.32   CALCIUM mg/dL 8.8 9.1 8.9  < > 8.7 8.5   BILIRUBIN mg/dL  --  0.8  --   --  0.7 1.0   ALK PHOS U/L  --  93  --   --  77 74   ALT (SGPT) U/L  --  39  --   --  47 37   AST (SGOT) U/L  --  50*  --   --  42 51*   GLUCOSE mg/dL 68* 77 67*  < > 71 72   < > = values in this interval not displayed.    Radiology:   Imaging Results (last 72 hours)     Procedure Component Value Units Date/Time    XR Spine Lumbar AP & Lateral [[id number]] Collected:  08/27/18 1837     Updated:  08/27/18 1841    Narrative:       EXAMINATION:   XR SPINE LUMBAR AP AND LATERAL-  8/27/2018 6:37 PM CDT     HISTORY: Patient fell     3 views the lumbar spine are obtained. Lumbar vertebra are relatively  aligned. Loss height of the L4-5 disc.     Prominent hypertrophic in      syndesmophyte formations present at L1-2 and L2-3 levels. Hypertrophic  degenerative changes present the L4-5 level. Anterior hypertrophic  osteophytes are present the L1-2, L2-3 and L3-4 levels.     SI joints are normal.     IMPRESSION hypertrophic degenerative changes noted throughout the lumbar  spine.  This report was  finalized on 08/27/2018 18:38 by Dr. Tacho Frias MD.    XR Spine Thoracic 2 View [[id number]] Collected:  08/27/18 1836     Updated:  08/27/18 1839    Narrative:       XR SPINE THORACIC 2 VW- 8/27/2018 6:04 PM CDT     HISTORY: fall; I62.00-Nontraumatic subdural hemorrhage, unspecified;  N17.9-Acute kidney failure, unspecified; E86.0-Dehydration;  D64.9-Anemia, unspecified; M62.82-Rhabdomyolysis; F10.10-Alcohol abuse,  uncomplicated; W19.XXXA-Unspecified fall, initial encounter;  T07.XXXA-Unspecified multiple injuries, initial encounter;  R74.8-Abnormal levels of other serum enzymes; Z74.09-Other reduced  mobility; R53.1-Weak     COMPARISON: None     FINDINGS:  Frontal, lateral and swimmers lateral radiographs of the thoracic spine  demonstrate normal alignment without evidence of compression fracture or  subluxation. The pedicles are intact. Prominent anterior hypertrophic  degenerative changes noted in the mid and lower thoracic spine.  Ossification anterior longitudinal ligament is noted.     The visualized thorax is clear.        Impression:       1. Hypertrophic degenerative spondylosis is noted throughout the  thoracic spine. No acute compression deformity is identified..     This report was finalized on 08/27/2018 18:36 by Dr. Tacho Frias MD.    CT Cervical Spine Without Contrast [[id number]] Collected:  08/27/18 1833     Updated:  08/27/18 1838    Narrative:       EXAMINATION:   CT CERVICAL SPINE WO CONTRAST-  8/27/2018 6:33 PM CDT     HISTORY: CT CERVICAL SPINE without contrast dated 8/27/2018 6:13 PM CDT     HISTORY: fall; I62.00-Nontraumatic subdural hemorrhage, unspecified;  N17.9-Acute kidney failure, unspecified; E86.0-Dehydration;  D64.9-Anemia, unspecified; M62.82-Rhabdomyolysis; F10.10-Alcohol abuse,  uncomplicated; W19.XXXA-Unspecified fall, initial encounter;  T07.XXXA-Unspecified multiple injuries, initial encounter;  R74.8-Abnormal levels of other serum enzymes; Z74.09-Other  reduced  mobility; R53.1-Weak     COMPARISON: None      DOSE LENGTH PRODUCT: 302 mGy cm     TECHNIQUE: Serial helical tomographic images of the cervical spine were  obtained without the use of intravenous contrast. Additionally, sagittal  and coronal reformatted images were also provided for review.      FINDINGS:   Multilevel degenerative changes are seen in the cervical spine. Loss of  height of the C4-5 C5-6 and C6-7 disc space levels. Uncinate spurring is  present bilaterally to C5-6 and C6-7 levels.     The odontoid process is intact. There is hypertrophic degenerative  change in the posterior facets the C2-3 level     Ossification of the nuchal ligament is noted.      There is no evidence of acute fracture or subluxation. The prevertebral  soft tissues are within normal limits. The posterior elements are  intact. Vertebral body heights are maintained.     The visualized skull base is intact. The visualized thorax demonstrates  no acute abnormality.       Impression:       1. Degenerative changes in the cervical spine without evidence of acute  osseous injury.     This report was finalized on 08/27/2018 18:35 by Dr. Tacho Frias MD.    US Renal Bilateral [[id number]] Collected:  08/27/18 1554     Updated:  08/27/18 1559    Narrative:       EXAM:  US RENAL BILATERAL-     INDICATION:  Acute renal failure     COMPARISON:  None     TECHNIQUE:  Routine grayscale and color Doppler images were obtained  through the bilateral renal fossae.     FINDINGS:       Right Kidney: The right kidney measures 10.9 cm in longitudinal  dimension. There is good corticomedullary differentiation. There is no  evidence of hydronephrosis. There are no cystic lesions or masses. No  echogenic stone.     Left Kidney: The left kidney measures 10.1 cm in longitudinal dimension.  There is good corticomedullary differentiation. There is no evidence of  hydronephrosis. There are no cystic lesions or masses. No echogenic  stone.     Bladder:  The bladder is unremarkable without evidence of mass or  internal debris.  Ascites    Impression:       1.  Unremarkable appearance of the kidneys.  2.  Ascites.     This report was finalized on 08/27/2018 15:55 by Dr. Stefani Dunaway MD.    CT Head Without Contrast [[id number]] Collected:  08/27/18 0723     Updated:  08/27/18 0736    Narrative:       CT HEAD WO CONTRAST- 8/27/2018 4:44 AM CDT     HISTORY: sdh; I62.00-Nontraumatic subdural hemorrhage, unspecified;  N17.9-Acute kidney failure, unspecified; E86.0-Dehydration;  D64.9-Anemia, unspecified; M62.82-Rhabdomyolysis; F10.10-Alcohol abuse,  uncomplicated; W19.XXXA-Unspecified fall, initial encounter;  T07.XXXA-Unspecified multiple injuries, initial encounter;  R74.8-Abnormal levels of other serum enzymes       DOSE LENGTH PRODUCT: 1236 mGy cm. Automated exposure control was also  utilized to decrease patient radiation dose.     Technique:   Axial CT of the brain without IV contrast. Sagittal and coronal  reformations are also provided for review. Soft tissue and bone kernels  are available for interpretation.     Comparison: CT scan dated 8/26/2018.     Findings:      Stable mixed-density right cerebral convexity subdural hematoma  measuring up to 13 mm in thickness (series 5-image 24). A 2.6 cm  hyperdensity is noted in the paramedian posterior right frontal lobe  (series 7-image 27). There are speckled areas of dense calcification  more inferiorly along this hyperdensity. There does not appear to be  obvious volume loss in this area to suggest an old injury. No mass  effect or adjacent vasogenic edema.     There is no evidence of acute large vascular distribution infarct.  The  ventricles, cortical sulci and basal cisterns are symmetric and age  appropriate.  Normal brainstem and posterior fossa.  The scalp and  calvarium are unremarkable. Visualized paranasal sinuses and mastoids  are clear.        Impression:       Impression:    1. Stable mixed-density  (acute on chronic) right convexity subdural  hematoma. No significant mass effect.  2.  Stable 2.6 cm intraparenchymal hyperdensity in the paramedian  posterior right frontal lobe. This does not appear acute, unlikely acute  intracranial hemorrhage. Appearance would be unusual for neoplasm.  Consider MRI.  This report was finalized on 08/27/2018 07:33 by Dr Jaquan Roth, .    XR Shoulder 2+ View Left [[id number]] Collected:  08/26/18 1150     Updated:  08/26/18 1156    Narrative:       EXAM: XR SHOULDER 2+ VW LEFT- - 8/26/2018 9:57 AM CDT     HISTORY: pain after fall; I62.00-Nontraumatic subdural hemorrhage,  unspecified; N17.9-Acute kidney failure, unspecified; E86.0-Dehydration;  D64.9-Anemia, unspecified; M62.82-Rhabdomyolysis; F10.10-Alcohol abuse,  uncomplicated; W19.XXXA-Unspecified fall, initial encounter;  T07.XXXA-Unspecified multiple injuries, initial encounter;  R74.8-Abnormal levels of other serum enzymes       COMPARISON: None.      TECHNIQUE:  2 images.  Portable AP internal rotation and AP external  rotation views of the left shoulder.     FINDINGS:    Limited assessment of alignment on provided views. No displaced  fracture. No aggressive bony lesion. Degenerative changes at the  acromioclavicular joint. Apparent superior migration of the humeral head  may indicate chronic rotator cuff injury.          Impression:       1. Limited assessment of alignment on portable views.  2. No acute bony finding.  3. Apparent superior migration of the humeral head may indicate chronic  rotator cuff injury or this finding could be positional.  This report was finalized on 08/26/2018 11:53 by Dr Tabatha Pacheco MD.    XR Pelvis 1 or 2 View [[id number]] Collected:  08/26/18 1143     Updated:  08/26/18 1147    Narrative:       EXAM: XR PELVIS 1 OR 2 VW- - 8/25/2018 11:31 PM CDT     HISTORY: fall       COMPARISON: None.      TECHNIQUE:  1 images.  Frontal view of the pelvis     FINDINGS:    No displaced fracture or  aggressive bony lesion. Degenerative changes at  the bilateral hips. Sacroiliac joints and pubic symphysis anatomic.  Sacral arcuate lines appear intact. Degenerative changes at the  lumbosacral spine. Pelvic phleboliths. Vascular calcifications. Surgical  clips overlie crying soft tissues, possibly vasectomy.          Impression:       1. No acute bony finding.  2. Vascular calcifications.  This report was finalized on 08/26/2018 11:44 by Dr Tabatha Pacheco MD.    CT Head Without Contrast [[id number]] Collected:  08/26/18 1047     Updated:  08/26/18 1053    Narrative:       EXAM: CT HEAD WO CONTRAST- - 8/26/2018 8:13 AM CDT     HISTORY: Subdural hematoma; I62.00-Nontraumatic subdural hemorrhage,  unspecified; N17.9-Acute kidney failure, unspecified; E86.0-Dehydration;  D64.9-Anemia, unspecified; M62.82-Rhabdomyolysis; F10.10-Alcohol abuse,  uncomplicated; W19.XXXA-Unspecified fall, initial encounter;  T07.XXXA-Unspecified multiple injuries, initial encounter;  R74.8-Abnormal levels of other serum enzymes       COMPARISON: 8/25/2018.      DOSE LENGTH PRODUCT: 715 mGy cm. Automated exposure control was also  utilized to decrease patient radiation dose.     TECHNIQUE: Unenhanced axial CT images obtained from vertex to skull  base.     FINDINGS:  Redemonstration of right frontoparietal extra-axial hematoma measuring  14 mm in thickness. No significant change. Similar mild mass effect on  the adjacent sulci. No midline shift.     Similar hyperdensity at the right frontal lobe with small associated  calcifications.     Ventricles, remaining sulci and basilar cisterns within normal limits.  Midline structures anatomic.     Globes, retrobulbar soft tissues, paranasal sinuses, mastoid air cells  and external auditory canals are within normal limits. No depressed  skull fracture. Similar mild swelling of the right frontoparietal scalp.       Impression:       1. Similar size of right frontoparietal extra-axial hematoma. No  midline  shift.  2. Similar hyperdensity of the right frontal lobe.  This report was finalized on 08/26/2018 10:50 by Dr Tabatha Pacheco MD.    CT Head Without Contrast [[id number]] Collected:  08/26/18 1031     Updated:  08/26/18 1050    Narrative:       EXAM: CT HEAD WO CONTRAST- - 8/25/2018 11:18 PM CDT     HISTORY: etoh fall       COMPARISON: Subsequent exam 8/26/2018.      DOSE LENGTH PRODUCT: 783 mGy cm. Automated exposure control was also  utilized to decrease patient radiation dose.     TECHNIQUE: Unenhanced axial CT images obtained from vertex to skull  base. Preliminary interpretation performed by stat vero 8/25/2018 at 2348  hours.     FINDINGS:  There is hyperdense extra-axial fluid collection at the right  frontoparietal convexity, measuring 14 mm in thickness and up to 7.8 cm  in AP dimension on axial image 20. Mild mass effect on the adjacent  sulci. No midline shift.     Hyperdensity with possible small calcification at the superior right  frontal lobe cortex and subcortical white matter.      Ventricles, sulci and basilar cisterns within normal limits. Midline  structures anatomic.     Globes, retrobulbar soft tissues, paranasal sinuses, mastoid air cells  and external auditory canals are within normal limits. Calvarium appears  intact. Thickening of the right frontoparietal subcutaneous tissues,  likely hematoma.       Impression:       1. Right frontoparietal extra-axial hematoma measuring 14 mm in  thickness. Mild mass effect on the adjacent sulci. No midline shift.  2. Hyperdensity at the high right frontal lobe of uncertain chronicity,  could represent parenchymal hemorrhage, sequelae of prior infection or  vascular anomaly.  This report was finalized on 08/26/2018 10:47 by Dr Tabatha Pacheco MD.          Culture:         Assessment   1.  Acute kidney injury due to ATN--still worsening  2.  Rhabdomyolysis--resolved  3.  Fall with subdural hematoma  4.  Alcoholic cirrhosis  5.  Anemia  6.   Metabolic acidosis  7.  Hypernatremia--stable  8.  Metabolic encephalopathy    Plan:  1.  500 ml IV normal saline bolus now  2.  Continue hypotonic IV fluids  3.  Discussion with patient's sister at bedside, renal function continues to decline. Overall prognosis is poor.      Sina Casas, NITISH  9/5/2018  9:51 AM

## 2018-09-05 NOTE — PLAN OF CARE
Problem: Fall Risk (Adult)  Goal: Absence of Fall  Outcome: Ongoing (interventions implemented as appropriate)      Problem: Skin Injury Risk (Adult)  Goal: Skin Health and Integrity  Outcome: Ongoing (interventions implemented as appropriate)      Problem: Patient Care Overview  Goal: Plan of Care Review  Outcome: Ongoing (interventions implemented as appropriate)   09/05/18 0331   Coping/Psychosocial   Plan of Care Reviewed With patient   Plan of Care Review   Progress declining   OTHER   Outcome Summary Con't NPO status. Opens eyes slightly when spoken to, mumbles. Turn and reposition every 2hrs. BP low. NS bolus given. Sister at bedside. Con't to monitor.     Goal: Individualization and Mutuality  Outcome: Ongoing (interventions implemented as appropriate)    Goal: Discharge Needs Assessment  Outcome: Ongoing (interventions implemented as appropriate)    Goal: Interprofessional Rounds/Family Conf  Outcome: Ongoing (interventions implemented as appropriate)      Problem: Alcohol Withdrawal Acute, Risk/Actual (Adult)  Goal: Signs and Symptoms of Listed Potential Problems Will be Absent, Minimized or Managed (Alcohol Withdrawal Acute, Risk/Actual)  Outcome: Ongoing (interventions implemented as appropriate)      Problem: Nutrition, Imbalanced: Inadequate Oral Intake (Adult)  Goal: Improved Oral Intake  Outcome: Ongoing (interventions implemented as appropriate)      Problem: Brain Injury, Moderate Traumatic (GCS 9-12) (Adult)  Goal: Signs and Symptoms of Listed Potential Problems Will be Absent, Minimized or Managed (Brain Injury, Moderate Traumatic)  Outcome: Ongoing (interventions implemented as appropriate)

## 2018-09-05 NOTE — CONSULTS
Palliative Care Initial Consult   Attending Physician: Den Elena DO  Referring Provider:  Den Elena D.O.    Reason for Referral: assistance with clarification of goals of care and hospice referral or discussion  Family/Support:  Goals of Care: TBD.       HPI:   68 y.o. male with past medical history significant for over the last 24 hours.  Palliative Care Spoke With: family Patient's daughter, Luna, plans to arrived around 10 AM this morning to further discuss goals of care.  Daughter reports patient's quality of life and functional status over the last year and a half have continued to decline and her father once again began drinking despite warnings of self-harm with alcohol-induced cirrhosis of the liver.  Due to the discussion of CODE STATUS we will establish an advance care plan.   ADVANCE CARE PLANNING-Palliative Care Topics Discussed: palliative care, goals of care, care options, resuscitation status, [facility] and discharge options patient's daughter Luna verbalizes a general understanding of the patient's current health status and poor prognosis.  She reports if patient was able to speak for himself he would not wish to pursue overly aggressive or heroic measures.  Family has elected to pursue comfort measures in line with the patient's goals of care.  Family has elected for patient to discharge to nursing facility and interested in seeking hospice services.  We reviewed and initiated the medical course for scope of treatment (MOST) form.  Questions answered and support provided.  Health Care Directives/Treatment Preferences  Advance Directive Document on Chart at Time of Consult: no  Pre-existing AND/MOST/POLST Order: No  Advance Directive Status: Patient has advance directive, copy in chart  Surrogate Decision Maker: identified and documented  Goals of Care/Treatment Preferences (initial assessment and clinical changes): Goals of care/treatment preferences discussed with patient  "with decisional capacity and/or surrogate decision maker  Treatment Preferences: comfort measures  Code Status Discussed: yes  POLST/MOST Initiated: yes    ROS:  And able to assess due to the acuity of condition and altered mental status.    Past Medical History:   Diagnosis Date   • Alcohol abuse    • Ascites    • Cirrhosis of liver (CMS/HCC)    • Hernia of abdominal wall      Past Surgical History:   Procedure Laterality Date   • BACK SURGERY       Social History     Social History   • Marital status:      Spouse name: N/A   • Number of children: N/A   • Years of education: N/A     Occupational History   • Not on file.     Social History Main Topics   • Smoking status: Never Smoker   • Smokeless tobacco: Never Used   • Alcohol use Yes      Comment: at least 6 pack/daily   • Drug use: No   • Sexual activity: Not on file     Other Topics Concern   • Not on file     Social History Narrative   • No narrative on file       No Known Allergies    Current medication reviewed for route, type, dose and frequency and are current per MAR at time of dictation.      BP (!) 85/69 (BP Location: Right arm, Patient Position: Lying)   Pulse 51   Temp 97 °F (36.1 °C) (Axillary)   Resp 18   Ht 188 cm (74\")   Wt 94.9 kg (209 lb 4.8 oz)   SpO2 100%   BMI 26.87 kg/m²     Diagnostics: Reviewed    Patient Active Problem List   Diagnosis   • Subdural hematoma (CMS/HCC)   • Alcohol abuse   • ТАТЬЯНА (acute kidney injury) (CMS/HCC)   • Umbilical hernia   • Abrasions of multiple sites   • Cirrhosis of liver (CMS/HCC)   • Non-traumatic rhabdomyolysis   • Dehydration   • Elevated troponin   • Anemia       Physical Exam:  General Appearance No apparent distress noted, patient remains somnolent, he attempts to follow commands  Head normocephalic, without obvious abnormality and atraumatic  Eyes lids and lashes normal, conjunctivae and sclerae normal, no icterus and PERRLA  Throat oral mucosa moist  Lungs clear to auscultation, " respirations regular, respirations even and respirations unlabored  Heart regular rhythm & normal rate and normal S1, S2  Abdomen normal bowel sounds, soft non-tender and no guarding  Skin Multiple Abrasions as documented  Neurologic Mental Status alertness somnolent, Speech Garbled, attempts to nod appropriately, Reflexes Weak  reflex, positive corneal positive and gag, able to wiggle toes on left, very slight movement noted on right lower extremity      Patient status: Disease state: No further treatment being pursued.  MELD score-23  Functional status: Palliative Performance Scale Score: Performance 10% based on the following measures: Ambulation: Totally bed bound, Activity and Evidence of Disease: Unable to do any work, extensive evidence of disease, Self-Care: Total care required,  Intake: Mouth care only, LOC: Drowsy or comatose   Nutritional status: Nothing by mouth, high risk for aspiration. Body mass index is 26.87 kg/m².  Screening Status/Interventions  Psychosocial Needs: pos  Psychosocial Needs Intervened: yes  Spiritual Needs: neg  Goals of Care/ACP: pos  Goals of Care/ACP Intervened: yes   Palliative Care Acuity  Psychosocial Acuity: normal complexity  Spiritual Acuity: normal complexity  Family support: The patient receives support from his daughter and extended family..  POA/Healthcare surrogate-no advance directive on file    Impression/Problem List:    1. Subdural hematoma after fall  2. Cirrhosis of liver and alcohol abuse  3. Acute renal failure  4. Rhabdomyolysis  5. Dehydration-resolved  6. Altered mental status  7. Dysphagia  8. Hypotensive  9. Bradycardia     Recommendations/Plan:  1. plan: Goals of care include CODE STATUS no CPR\comfort measures.   -Plan to discharge to skilled nursing facility with palliative care\comfort care  -Medical orders for scope of treatment (MOST) reviewed, initiated, and completed today.  Original to follow patient  Outcomes  Code Status After Consult:  DNR/DNI  Number of Family Meetings: 1  Number of Days Until Referral Purpose Met/Improved: 1  Other Outcomes: code status clarified, POLST completed      Thank you for this consult and allowing us to participate in patient's plan of care. Palliative Care Team will continue to follow patient.     Time spent: 90 minutes spent reviewing medical and medication records, assessing and examining patient, discussing with family, answering questions, providing some guidance about a plan and documentation of care, and coordinating care with other healthcare members, with > 50% time spent face to face.   Greater than 46 minutes spent on advance care planning.    Kanika Mckeon, APRN  9/5/2018

## 2018-09-05 NOTE — PROGRESS NOTES
Continued Stay Note  [facility]     Patient Name: Ryan Alvarez  MRN: [id number]  Today's Date: 9/5/2018    Admit Date: 8/25/2018          Discharge Plan     Row Name 09/05/18 1110       Plan    [facility]    Patient/Family in Agreement with Plan yes    Final Discharge Disposition Code 04 - intermediate care facility    Final Note Plan discharge today to [facility]. Discharge summary, discharge med list, and scripts to be faxed to facility at [phone]. Patient's nurse will call report to [phone]. Note that patient is now palliative care. [facility] hosp[facility] services if patient's needs become more than what can be handled by nursing staff at [facility]. Plan EMS transport.            Expected Discharge Date and Time     Expected Discharge Date Expected Discharge Time    Sep 5, 2018             HOMERO Back

## 2018-09-06 NOTE — THERAPY DISCHARGE NOTE
Acute Care - Speech Language Pathology Discharge Summary  [facility]       Patient Name: Ryan Alvarez  : 1950  MRN: [id number]    Today's Date: 2018  Onset of Illness/Injury or Date of Surgery: 18       Referring Physician: Dr. Salinas        Admit Date: 2018      SLP Recommendation and Plan  NPO. Cannot r/o aspiration with PO intake secondary to persistent lethargy. MD to determine family desires for POC in terms of nutritional intake at this time. Thanks!   Chely Hoover, CCC-SLP 2018 9:20 AM    Visit Dx:    ICD-10-CM ICD-9-CM   1. Subdural hematoma (CMS/HCC) I62.00 432.1   2. ТАТЬЯНА (acute kidney injury) (CMS/HCC) N17.9 584.9   3. Dehydration E86.0 276.51   4. Anemia, unspecified type D64.9 285.9   5. Non-traumatic rhabdomyolysis M62.82 728.88   6. Alcohol abuse F10.10 305.00   7. Fall, initial encounter W19.XXXA E888.9   8. Abrasions of multiple sites T07.XXXA 919.0   9. Elevated troponin R74.8 790.6   10. Impaired mobility and ADLs Z74.09 799.89   11. Generalized weakness R53.1 780.79   12. Oropharyngeal dysphagia R13.12 787.22                     SLP GOALS     Row Name 18 0918 18 1504 18 0838       Oral Nutrition/Hydration Goal 1 (SLP)    Oral Nutrition/Hydration Goal 1, SLP Pt will tolerate LRD w/o any overt s/s of aspiration.  -TM Pt will tolerate LRD w/o any overt s/s of aspiration.  -MB Pt will tolerate LRD w/o any overt s/s of aspiration.  -TM    Time Frame (Oral Nutrition/Hydration Goal 1, SLP) by discharge  -TM by discharge  -MB by discharge  -TM    Barriers (Oral Nutrition/Hydration Goal 1, SLP) Lethargy, cognitive status  -TM Lethargy, cognitive status  -MB Lethargy, cognitive status  -TM    Progress/Outcomes (Oral Nutrition/Hydration Goal 1, SLP) goal not met;discharged from facility  -TM unable to make needed progress  -MB unable to make needed progress  -TM      User Key  (r) = Recorded By, (t) = Taken By, (c) = Cosigned By    Initials Name Provider Type     Eugenio Amador, CCC-SLP Speech and Language Pathologist    Chely Mcguire CCC-SLP Speech and Language Pathologist                  SLP Discharge Summary  Anticipated Dischage Disposition: skilled nursing facility  Reason for Discharge: discharge from this facility  Progress Toward Achieving Short/long Term Goals: unable to make functional progress at this time  Discharge Destination: SNF      Chely Hoover CCC-ANETTE  9/6/2018

## 2018-09-06 NOTE — THERAPY DISCHARGE NOTE
Acute Care - Occupational Therapy Discharge Summary  [facility]     Patient Name: Ryan Alvarez  : 1950  MRN: [id number]    Today's Date: 2018  Onset of Illness/Injury or Date of Surgery: 18    Date of Referral to OT: 18  Referring Physician: Dr. Salinas      Admit Date: 2018        OT Recommendation and Plan    Visit Dx:    ICD-10-CM ICD-9-CM   1. Subdural hematoma (CMS/[facility]) I62.00 432.1   2. ТАТЬЯНА (acute kidney injury) (CMS/[facility]) N17.9 584.9   3. Dehydration E86.0 276.51   4. Anemia, unspecified type D64.9 285.9   5. Non-traumatic rhabdomyolysis M62.82 728.88   6. Alcohol abuse F10.10 305.00   7. Fall, initial encounter W19.XXXA E888.9   8. Abrasions of multiple sites T07.XXXA 919.0   9. Elevated troponin R74.8 790.6   10. Impaired mobility and ADLs Z74.09 799.89   11. Generalized weakness R53.1 780.79   12. Oropharyngeal dysphagia R13.12 787.22                     OT Rehab Goals     Row Name 18 0700             Transfer Goal 1 (OT)    Activity/Assistive Device (Transfer Goal 1, OT) toilet;tub  -TS      [place] Level/Cues Needed (Transfer Goal 1, OT) supervision required  -TS      Time Frame (Transfer Goal 1, OT) 10 days  -TS      Progress/Outcome (Transfer Goal 1, OT) goal not met  -TS         Bathing Goal 1 (OT)    Activity/Assistive Device (Bathing Goal 1, OT) bathing skills, all;grab bar/tub rail  -TS      [place] Level/Cues Needed (Bathing Goal 1, OT) minimum assist (75% or more patient effort)  -TS      Time Frame (Bathing Goal 1, OT) 10 days  -TS      Progress/Outcomes (Bathing Goal 1, OT) goal not met  -TS         Dressing Goal 1 (OT)    Activity/Assistive Device (Dressing Goal 1, OT) lower body dressing  -TS      [place]/Cues Needed (Dressing Goal 1, OT) supervision required  -TS      Time Frame (Dressing Goal 1, OT) 10 days  -TS      Progress/Outcome (Dressing Goal 1, OT) goal not met  -TS        User Key  (r) = Recorded By, (t) = Taken By, (c) = Cosigned By     Initials Name Provider Type Discipline    TS Ena Loera COTA/L Occupational Therapy Assistant OT                Outcome Measures     Row Name 09/04/18 1400 09/03/18 0858          How much help from another person do you currently need...    Turning from your back to your side while in flat bed without using bedrails? 1  -AH 2  -TR     Moving from lying on back to sitting on the side of a flat bed without bedrails? 1  -AH 2  -TR     Moving to and from a bed to a chair (including a wheelchair)? 1  -AH 2  -TR     Standing up from a chair using your arms (e.g., wheelchair, bedside chair)? 1  - 2  -TR     Climbing 3-5 steps with a railing? 1  - 1  -TR     To walk in hospital room? 1  - 2  -TR     AM-PAC 6 Clicks Score 6  - 11  -TR        Functional Assessment    Outcome Measure Options AM-PAC 6 Clicks Basic Mobility (PT)  -AH AM-PAC 6 Clicks Basic Mobility (PT)  -TR       User Key  (r) = Recorded By, (t) = Taken By, (c) = Cosigned By    Initials Name Provider Type     Gena Kaur, PTA Physical Therapy Assistant    Aysha Ruby PTA Physical Therapy Assistant          Therapy Suggested Charges     Code   Minutes Charges    83856 (CPT®) Hc Ot Neuromusc Re Education Ea 15 Min      93138 (CPT®) Hc Ot Ther Proc Ea 15 Min      97869 (CPT®) Hc Ot Therapeutic Act Ea 15 Min      20080 (CPT®) Hc Ot Manual Therapy Ea 15 Min      49376 (CPT®) Hc Ot Iontophoresis Ea 15 Min      43754 (CPT®) Hc Ot Elec Stim Ea-Per 15 Min      74867 (CPT®) Hc Ot Ultrasound Ea 15 Min      01665 (CPT®) Hc Ot Self Care/Mgmt/Train Ea 15 Min 25 2    Total  25 2              OT Discharge Summary  Reason for Discharge: Discharge from facility  Outcomes Achieved: Refer to plan of care for updates on goals achieved  Discharge Destination: [facility]      ROSY Pablo  9/6/2018

## 2018-09-06 NOTE — THERAPY DISCHARGE NOTE
Acute Care - Physical Therapy Progress Note/Discharge  [facility]     Patient Name: Ryan Alvarez  : 1950  MRN: [id number]  Today's Date: 2018  Onset of Illness/Injury or Date of Surgery: 18  Date of Referral to PT: 18  Referring Physician: Dr. Salinas    Admit Date: 2018    Visit Dx:    ICD-10-CM ICD-9-CM   1. Subdural hematoma (CMS/HCC) I62.00 432.1   2. ТАТЬЯНА (acute kidney injury) (CMS/HCC) N17.9 584.9   3. Dehydration E86.0 276.51   4. Anemia, unspecified type D64.9 285.9   5. Non-traumatic rhabdomyolysis M62.82 728.88   6. Alcohol abuse F10.10 305.00   7. Fall, initial encounter W19.XXXA E888.9   8. Abrasions of multiple sites T07.XXXA 919.0   9. Elevated troponin R74.8 790.6   10. Impaired mobility and ADLs Z74.09 799.89   11. Generalized weakness R53.1 780.79   12. Oropharyngeal dysphagia R13.12 787.22     Patient Active Problem List   Diagnosis   • Subdural hematoma (CMS/HCC)   • Alcohol abuse   • ТАТЬЯНА (acute kidney injury) (CMS/HCC)   • Umbilical hernia   • Abrasions of multiple sites   • Cirrhosis of liver (CMS/HCC)   • Non-traumatic rhabdomyolysis   • Dehydration   • Elevated troponin   • Anemia       Physical Therapy Education     Title: PT OT SLP Therapies (Resolved)     Topic: Physical Therapy (Resolved)     Point: Mobility training (Resolved)    Learning Progress Summary     Learner Status Readiness Method Response Comment Documented by    Patient Active Acceptance E NR,NL BOA TR 18 0934     Active Acceptance E NR BOA TR 18 1138     Active Acceptance E,D NR Safety with transfers and gait, benefits of activity TR 18 0939     Done Acceptance E,TB,D MODE,DU,NR Education for safety/falls prevention with activity  Education for improved technique with bed mobility, transfers, and taking steps at bedside to reduce risk for LOB/falls JE 18 1201    Family Active Acceptance E,D NR Safety with transfers and gait, benefits of activity TR 18 0939          Point:  Precautions (Resolved)    Learning Progress Summary     Learner Status Readiness Method Response Comment Documented by    Patient Done Acceptance E,VIRGEN,MOODY COELLO,DU,NR Education for safety/falls prevention with activity  Education for improved technique with bed mobility, transfers, and taking steps at bedside to reduce risk for LOB/falls  08/27/18 1201    Family Done Acceptance VIRGEN JOAQUIN D VU positioning  09/04/18 1414                      User Key     Initials Effective Dates Name Provider Type Discipline     08/02/16 -  Gena Kaur, PTA Physical Therapy Assistant PT     08/02/16 -  Aysha Tineo PTA Physical Therapy Assistant PT     08/02/18 -  Marilia Farrar, PT Physical Therapist PT                    PT Rehab Goals     Row Name 09/06/18 1540             Bed Mobility Goal 1 (PT)    [place] Level/Cues Needed (Bed Mobility Goal 1, PT) maximum assist (25-49% patient effort)   goal: conditional independence  -KHADRA      Progress/Outcomes (Bed Mobility Goal 1, PT) goal not met  -KHADRA         Transfer Goal 1 (PT)    [place] Level/Cues Needed (Transfer Goal 1, PT) maximum assist (25-49% patient effort)   goal: SBA  -KHADRA      Progress/Outcome (Transfer Goal 1, PT) goal not met  -KHADRA         Gait Training Goal 1 (PT)    [place] Level (Gait Training Goal 1, PT) --   goal; CGA. Not able to walk  -KHADRA      Distance (Gait Goal 1, PT) 0   goal:100'  -KHADRA      Progress/Outcome (Gait Training Goal 1, PT) goal not met  -KHADRA        User Key  (r) = Recorded By, (t) = Taken By, (c) = Cosigned By    Initials Name Provider Type Discipline    KHADRA Josee Quintana, PTA Physical Therapy Assistant PT        Therapy Treatment        Rehabilitation Treatment Summary     Row Name                Wound 08/26/18 0213 Left  abrasion    Wound - Properties Group Date first assessed: 08/26/18 [SB] Time first assessed: 0213 [SB] Present On Admission : yes [SB] Side: Left [SB] Type: abrasion [SB] Recorded by:  [SB] Rinku  Corrina FERREIRA RN 08/26/18 0341    Row Name                Wound 08/26/18 0213 Left knee abrasion    Wound - Properties Group Date first assessed: 08/26/18 [SB] Time first assessed: 0213 [SB] Side: Left [SB] Location: knee [SB] Type: abrasion [SB] Recorded by:  [SB] Corrina Dobbs RN 08/26/18 0344    Row Name                Wound 08/28/18 2000 Left ankle abrasion    Wound - Properties Group Date first assessed: 08/28/18 [AR] Time first assessed: 2000 [AR] Present On Admission : yes [AR] Side: Left [AR] Location: ankle [AR] Type: abrasion [AR] Recorded by:  [AR] Glory Castorena RN 08/29/18 0104    Row Name                Wound 08/28/18 2000 Left heel abrasion    Wound - Properties Group Date first assessed: 08/28/18 [AR] Time first assessed: 2000 [AR] Present On Admission : yes [AR] Side: Left [AR] Location: heel [AR] Type: abrasion [AR] Recorded by:  [AR] Glory Castorena RN 08/29/18 0105    Row Name                Wound 08/28/18 2000 Right ankle abrasion    Wound - Properties Group Date first assessed: 08/28/18 [AR] Time first assessed: 2000 [AR] Present On Admission : yes [AR] Side: Right [AR] Location: ankle [AR] Type: abrasion [AR] Recorded by:  [AR] Glory Castorena RN 08/29/18 0106    Row Name                Wound 08/28/18 2000 Right heel abrasion    Wound - Properties Group Date first assessed: 08/28/18 [AR] Time first assessed: 2000 [AR] Present On Admission : yes [AR] Side: Right [AR] Location: heel [AR] Type: abrasion [AR] Recorded by:  [AR] Glory Castorena RN 08/29/18 0107    Row Name                Wound 08/31/18 2000 Left upper back abrasion    Wound - Properties Group Date first assessed: 08/31/18 [FI] Time first assessed: 2000 [FI] Present On Admission : no [FI] Side: Left [FI] Orientation: upper [FI] Location: back [FI] Type: abrasion [FI2] Recorded by:  [FI] Dina Thomson, RNA 08/31/18 2152 [FI2] Dina Thomson, RNA 08/31/18 2155    Row Name                Wound 08/31/18 2000 lower back  abrasion    Wound - Properties Group Date first assessed: 08/31/18 [AR] Time first assessed: 2000 [AR] Present On Admission : picture taken [AR] Orientation: lower [AR] Location: back [AR] Type: abrasion [AR] Recorded by:  [AR] Glory Castorena RN 09/01/18 0444    Row Name                Wound 08/31/18 2000 Right hip abrasion    Wound - Properties Group Date first assessed: 08/31/18 [AR] Time first assessed: 2000 [AR] Present On Admission : picture taken [AR] Side: Right [AR] Location: hip [AR] Type: abrasion [AR] Recorded by:  [AR] Glory Castorena RN 09/01/18 0447      User Key  (r) = Recorded By, (t) = Taken By, (c) = Cosigned By    Initials Name Effective Dates Discipline    AR Glory Castorena RN 08/02/16 -  Nurse    Corrina Xie RN 12/15/17 -  Nurse    Dina Salgado RNA 01/16/18 -  Nurse             PT Recommendation and Plan  Anticipated Discharge Disposition (PT): skilled nursing facility  Outcome Summary/Treatment Plan (PT)  Anticipated Discharge Disposition (PT): skilled nursing facility          Outcome Measures     Row Name 09/04/18 1400             How much help from another person do you currently need...    Turning from your back to your side while in flat bed without using bedrails? 1  -AH      Moving from lying on back to sitting on the side of a flat bed without bedrails? 1  -AH      Moving to and from a bed to a chair (including a wheelchair)? 1  -AH      Standing up from a chair using your arms (e.g., wheelchair, bedside chair)? 1  -AH      Climbing 3-5 steps with a railing? 1  -AH      To walk in hospital room? 1  -AH      AM-PAC 6 Clicks Score 6  -AH         Functional Assessment    Outcome Measure Options AM-PAC 6 Clicks Basic Mobility (PT)  -AH        User Key  (r) = Recorded By, (t) = Taken By, (c) = Cosigned By    Initials Name Provider Type    Gena Dickens PTA Physical Therapy Assistant           Time Calculation:     Therapy Suggested Charges     Code   Minutes  Charges    02912 (CPT®) Hc Pt Neuromusc Re Education Ea 15 Min      95401 (CPT®) Hc Pt Ther Proc Ea 15 Min 23 2    56716 (CPT®) Hc Gait Training Ea 15 Min      43504 (CPT®) Hc Pt Therapeutic Act Ea 15 Min      21304 (CPT®) Hc Pt Manual Therapy Ea 15 Min      78955 (CPT®) Hc Pt Iontophoresis Ea 15 Min      49155 (CPT®) Hc Pt Elec Stim Ea-Per 15 Min      13109 (CPT®) Hc Pt Ultrasound Ea 15 Min      09572 (CPT®) Hc Pt Self Care/Mgmt/Train Ea 15 Min      17581 (CPT®) Hc Pt Prosthetic (S) Train Initial Encounter, Each 15 Min      57572 (CPT®) Hc Pt Orthotic(S)/Prosthetic(S) Encounter, Each 15 Min      99166 (CPT®) Hc Orthotic(S) Mgmt/Train Initial Encounter, Each 15min      Total  23 2              PT G-Codes  PT Professional Judgement Used?: Yes  Outcome Measure Options: AM-PAC 6 Clicks Basic Mobility (PT)  AM-PAC 6 Clicks Score: 6  Score: 18  Functional Limitation: Mobility: Walking and moving around  Mobility: Walking and Moving Around Current Status (): At least 40 percent but less than 60 percent impaired, limited or restricted  Mobility: Walking and Moving Around Goal Status (): At least 20 percent but less than 40 percent impaired, limited or restricted    PT Discharge Summary  Anticipated Discharge Disposition (PT): skilled nursing facility  Reason for Discharge: Discharge from facility  Outcomes Achieved: Unable to make functional progress toward goals at this time  Discharge Destination: SNF    Josee Quintana, PTA  9/6/2018

## 2018-09-14 NOTE — THERAPY DISCHARGE NOTE
Acute Care - Physical Therapy Discharge Summary  [facility]       Patient Name: Ryan Alvarez  : 1950  MRN: [id number]    Today's Date: 2018  Onset of Illness/Injury or Date of Surgery: 18    Date of Referral to PT: 18  Referring Physician: Dr. Salinas      Admit Date: 2018      PT Recommendation and Plan    Visit Dx:    ICD-10-CM ICD-9-CM   1. Subdural hematoma (CMS/[facility]) I62.00 432.1   2. ТАТЬЯНА (acute kidney injury) (CMS/[facility]) N17.9 584.9   3. Dehydration E86.0 276.51   4. Anemia, unspecified type D64.9 285.9   5. Non-traumatic rhabdomyolysis M62.82 728.88   6. Alcohol abuse F10.10 305.00   7. Fall, initial encounter W19.XXXA E888.9   8. Abrasions of multiple sites T07.XXXA 919.0   9. Elevated troponin R74.8 790.6   10. Impaired mobility and ADLs Z74.09 799.89   11. Generalized weakness R53.1 780.79   12. Oropharyngeal dysphagia R13.12 787.22                       PT Rehab Goals     Row Name 18 0800             Bed Mobility Goal 1 (PT)    Activity/Assistive Device (Bed Mobility Goal 1, PT) bed mobility activities, all  -MS      [place] Level/Cues Needed (Bed Mobility Goal 1, PT) conditional independence  -MS      Time Frame (Bed Mobility Goal 1, PT) by discharge  -MS      Progress/Outcomes (Bed Mobility Goal 1, PT) goal not met  -MS         Transfer Goal 1 (PT)    Activity/Assistive Device (Transfer Goal 1, PT) sit-to-stand/stand-to-sit  -MS      [place] Level/Cues Needed (Transfer Goal 1, PT) standby assist  -MS      Time Frame (Transfer Goal 1, PT) by discharge  -MS      Progress/Outcome (Transfer Goal 1, PT) goal not met  -MS         Gait Training Goal 1 (PT)    Activity/Assistive Device (Gait Training Goal 1, PT) gait (walking locomotion);assistive device use;decrease fall risk;forward stepping;increase endurance/gait distance  -MS      [place] Level (Gait Training Goal 1, PT) contact guard assist  -MS      Distance (Gait Goal 1, PT) 100 ft  -MS      Time Frame (Gait  Training Goal 1, PT) by discharge  -MS      Progress/Outcome (Gait Training Goal 1, PT) goal not met  -MS         Patient Education Goal (PT)    Activity (Patient Education Goal, PT) safety awareness/falls prevention  -MS      [place]/Cues/Accuracy (Memory Goal 2, PT) demonstrates adequately;verbalizes understanding  -MS      Time Frame (Patient Education Goal, PT) by discharge  -MS      Progress/Outcome (Patient Education Goal, PT) goal not met  -MS        User Key  (r) = Recorded By, (t) = Taken By, (c) = Cosigned By    Initials Name Provider Type Discipline    Delisa Jeffries, PT, DPT, NCS Physical Therapist PT              PT Discharge Summary  Reason for Discharge: Discharge from facility  Outcomes Achieved: Unable to make functional progress toward goals at this time  Discharge Destination: [facility]      Delisa Casanova, PT, DPT, NCS   9/14/2018

## 2018-10-11 LAB
FUNGUS WND CULT: NORMAL
MYCOBACTERIUM SPEC CULT: NORMAL
NIGHT BLUE STAIN TISS: NORMAL
NIGHT BLUE STAIN TISS: NORMAL